# Patient Record
Sex: FEMALE | Race: WHITE | Employment: OTHER | URBAN - NONMETROPOLITAN AREA
[De-identification: names, ages, dates, MRNs, and addresses within clinical notes are randomized per-mention and may not be internally consistent; named-entity substitution may affect disease eponyms.]

---

## 2017-11-24 ENCOUNTER — HOSPITAL ENCOUNTER (OUTPATIENT)
Age: 67
Discharge: HOME | End: 2017-11-24
Payer: MEDICARE

## 2017-11-24 LAB
HCT VFR BLD CALC: 28.6 % (ref 36–46)
HEMOGLOBIN: 9.8 G/DL (ref 12–16)
MCH RBC QN AUTO: 33.8 PG (ref 26–34)
MCHC RBC AUTO-ENTMCNC: 34.2 G/DL (ref 31–37)
MCV RBC AUTO: 98.8 FL (ref 80–100)
PDW BLD-RTO: 12.4 % (ref 12.1–15.2)
PLATELET # BLD: 422 K/UL (ref 140–450)
PMV BLD AUTO: 7.2 FL (ref 6–12)
RBC # BLD: 2.9 M/UL (ref 4–5.2)
WBC # BLD: 8.5 K/UL (ref 3.5–11)

## 2017-11-24 PROCEDURE — 36415 COLL VENOUS BLD VENIPUNCTURE: CPT

## 2017-11-24 PROCEDURE — 85027 COMPLETE CBC AUTOMATED: CPT

## 2017-12-19 ENCOUNTER — HOSPITAL ENCOUNTER (OUTPATIENT)
Dept: PHYSICAL THERAPY | Age: 67
Setting detail: THERAPIES SERIES
Discharge: HOME | End: 2017-12-19
Payer: MEDICARE

## 2017-12-19 PROCEDURE — G8978 MOBILITY CURRENT STATUS: HCPCS

## 2017-12-19 PROCEDURE — G8979 MOBILITY GOAL STATUS: HCPCS

## 2017-12-19 PROCEDURE — 97110 THERAPEUTIC EXERCISES: CPT

## 2017-12-19 PROCEDURE — 97162 PT EVAL MOD COMPLEX 30 MIN: CPT

## 2017-12-19 NOTE — PROGRESS NOTES
Phone: [address] [facility]          Fax: [phone]                      Outpatient Physical Therapy                                                                      Evaluation  Date: 2017  Patient: Marianne Harrison  : 1950  [facility] #: [id number]  Referring Practitioner: Dr. Matthew Marrero     Referral Date : 17     Diagnosis: status post total hip replacement; right Z96.641    Treatment Diagnosis: difficulty with ambulation s/pt R YFN  Onset Date: 17  PT Insurance Information: [facility] Medicare   Total # of Visits Approved: 18   Total # of Visits to Date: 1  No Show: 0  Canceled Appointment: 0     Subjective  Subjective: Pt had a R YFN on  and has been recieving home health therapy since  and D/C dec 14. Pt denies having any pain without pain medication. Pt is using a walker and has not tried to walk without it. Additional Pertinent Hx: HTN. OA  Pain Screening  Patient Currently in Pain: Denies          Objective           Strength RLE  Comment: hip flex 4/5 no abd per precaution        Assessment  Body structures, Functions, Activity limitations: Decreased functional mobility , Decreased endurance, Decreased strength, Decreased high-level IADLs, Decreased balance  Assessment: Pt is a 78year old female that underwent a R YFN. Pt presents with decreased R hip strength, ambulates using a FWW with slight antalgic gait and has decreased ambulation tolerance. Pt will benefit from skilled PT in order to address these deficits. Prognosis: Good        Decision Making: Medium Complexity    Patient Education  Pt educated on her POC and HEP   Pt verbalized/demonstrated good understanding:     [x] Yes         [] No, pt required further clarification.       [x] Primary Impairment :   G Code:    [x] Mobility         [] Carry        [] Body Position       [] Self Care      [] Other:   Functional Impairment Current:  [] 0%    [] 1-19% [x] 20-39% [] 40-59% [] 60-79%    [] 80-99% [] 100%  Functional Impairment Goal:  [x] 0%    [] 1-19% [] 20-39% [] 40-59% [] 60-79%    [] 80-99% [] 100%  G Code Functional Impairment determined by:  [x] Clinical Judgment   [] Outcome Measure:     Goals  Short term goals  Time Frame for Short term goals: 3 weeks   Short term goal 1: Pt will be educated on her POC and HEP-met  Short term goal 2: Pt will initiate ambulation with SPC-met    Long term goals  Time Frame for Long term goals : 6 weeks  Long term goal 1: Pt will be safe and independent with her HEP  Long term goal 2: Pt will be able to ambulate community distances without a SPC   Long term goal 3: Pt will increase R hip stenght to 4+/5 in order to ascend and descend steps in a reciporical pattern  Long term goal 4: Pt will be able to perform 5 sit to stand transfers without UE support       Patient goals : \"to walk without a walker\"        Minutes Tracking:  Time In: 1015  Time Out: [address]  Minutes: [facility], PT, DPT       12/19/2017

## 2017-12-19 NOTE — PLAN OF CARE
[facility]           Phone: [phone]             Outpatient Physical Therapy  Fax: [phone]                                           Date: 2017  Patient: Ivan Miranda : 1950 [facility] #: [id number]   Referring Practitioner:  Dr. Sobia Perez  Referral Date:  17       [x] Plan of Care   [] Updated Plan of Care    Dates of Service to Include: 2017 to 17    Diagnosis:  status post total hip replacement; right Z96.641    Rehab (Treatment) Diagnosis:  difficulty with ambulation s/pt R YFN             Onset Date:  17    Attendance  Total # of Visits to Date: 1 No Show: 0 Canceled Appointment: 0    Assessment  Body structures, Functions, Activity limitations: Decreased functional mobility , Decreased endurance, Decreased strength, Decreased high-level IADLs, Decreased balance  Assessment: Pt is a 78year old female that underwent a R YFN. Pt presents with decreased R hip strength, ambulates using a FWW with slight antalgic gait and has decreased ambulation tolerance. Pt will benefit from skilled PT in order to address these deficits.      [x] Primary Impairment :  G Code:    [] Mobility         [] Carry        [] Body Position       [] Self Care      [] Other:   Functional Impairment Current:  [] 0%    [] 1-19% [x] 20-39% [] 40-59% [] 60-79%    [] 80-99% [] 100%  Functional Impairment Goal:  [x] 0%    [] 1-19% [] 20-39% [] 40-59% [] 60-79%    [] 80-99% [] 100%  G Code Functional Impairment determined by:  [x] Clinical Judgment   [] Outcome Measure:     Goals  Short term goals  Time Frame for Short term goals: 3 weeks   Short term goal 1: Pt will be educated on her POC and HEP-met  Short term goal 2: Pt will initiate ambulation with SPC-met  Long term goals  Time Frame for Long term goals : 6 weeks  Long term goal 1: Pt will be safe and independent with her HEP  Long term goal 2: Pt will be able to ambulate community distances without a SPC   Long term goal 3: Pt will increase R hip stenght to 4+/5 in order to ascend and descend steps in a reciporical pattern  Long term goal 4: Pt will be able to perform 5 sit to stand transfers without UE support     Prognosis  Prognosis: Good    Treatment Plan   Times per week: 3x/wk   Plan weeks: 6 weeks   [x]HP/CP      [x]Electrical Stim   [x]Therapeutic Exercise      [x]Gait Training  [x]Aquatics   [x]Ultrasound         [x]Patient Education/HEP   [x]Manual Therapy  []Traction    [x]Neuro-yakov        [x]Soft Tissue Mobs            []Home TENS  []Iontophoresis    []Orthotic casting/fitting      []Dry Needling             Electronically signed by: Madyson Franks PT, DPT    Date: 12/19/2017      ______________________________________ Date: 12/19/2017   Physician Signature

## 2017-12-20 ENCOUNTER — HOSPITAL ENCOUNTER (OUTPATIENT)
Dept: PHYSICAL THERAPY | Age: 67
Setting detail: THERAPIES SERIES
Discharge: HOME | End: 2017-12-20
Payer: MEDICARE

## 2017-12-20 PROCEDURE — 97110 THERAPEUTIC EXERCISES: CPT

## 2017-12-20 NOTE — PROGRESS NOTES
Time Frame for Long term goals : 6 weeks  Long term goal 1: Pt will be safe and independent with her HEP []Met  []Partially met  []Not met   Long term goal 2: Pt will be able to ambulate community distances without a [facility]  []Met  []Partially met  []Not met   Long term goal 3: Pt will increase R hip stenght to 4+/5 in order to ascend and descend steps in a reciporical pattern []Met  []Partially met  []Not met   Long term goal 4: Pt will be able to perform 5 sit to stand transfers without UE support  []Met  []Partially met  []Not met     []Met  []Partially met  []Not met       Minutes Tracking:  Time In: 1100  Time Out: [address] [facility]  Minutes: Bassam Kennedy 91 DPT, PT   Date: 12/20/2017

## 2017-12-22 ENCOUNTER — HOSPITAL ENCOUNTER (OUTPATIENT)
Dept: PHYSICAL THERAPY | Age: 67
Setting detail: THERAPIES SERIES
Discharge: HOME | End: 2017-12-22
Payer: MEDICARE

## 2017-12-22 PROCEDURE — 97110 THERAPEUTIC EXERCISES: CPT

## 2017-12-27 ENCOUNTER — HOSPITAL ENCOUNTER (OUTPATIENT)
Dept: PHYSICAL THERAPY | Age: 67
Setting detail: THERAPIES SERIES
Discharge: HOME | End: 2017-12-27
Payer: MEDICARE

## 2017-12-27 NOTE — PROGRESS NOTES
[facility]  Inpatient/Observation/Outpatient Rehabilitation    Date: 2017  Patient Name: Zhen George       [] Inpatient Acute/Observation       []  Outpatient  : 1950       [] Pt no showed for scheduled appointment    [] Pt refused/declined therapy at this time due to:           [x] Pt cancelled due to:  [x] No Reason Given   [] Sick/ill   [] Other:    Has other appointments scheduled       Amador Aguirre Date: 2017

## 2018-01-03 ENCOUNTER — HOSPITAL ENCOUNTER (OUTPATIENT)
Dept: PHYSICAL THERAPY | Age: 68
Setting detail: THERAPIES SERIES
Discharge: HOME | End: 2018-01-03
Payer: MEDICARE

## 2018-01-03 PROCEDURE — 97110 THERAPEUTIC EXERCISES: CPT

## 2018-01-05 ENCOUNTER — HOSPITAL ENCOUNTER (OUTPATIENT)
Dept: PHYSICAL THERAPY | Age: 68
Setting detail: THERAPIES SERIES
Discharge: HOME | End: 2018-01-05
Payer: MEDICARE

## 2018-01-05 PROCEDURE — 97110 THERAPEUTIC EXERCISES: CPT

## 2018-01-08 ENCOUNTER — HOSPITAL ENCOUNTER (OUTPATIENT)
Dept: PHYSICAL THERAPY | Age: 68
Setting detail: THERAPIES SERIES
Discharge: HOME | End: 2018-01-08
Payer: MEDICARE

## 2018-01-10 ENCOUNTER — HOSPITAL ENCOUNTER (OUTPATIENT)
Dept: PHYSICAL THERAPY | Age: 68
Setting detail: THERAPIES SERIES
Discharge: HOME | End: 2018-01-10
Payer: MEDICARE

## 2018-01-10 PROCEDURE — 97110 THERAPEUTIC EXERCISES: CPT

## 2018-01-10 PROCEDURE — 97116 GAIT TRAINING THERAPY: CPT

## 2018-01-10 NOTE — PROGRESS NOTES
Phone: [facility]           Fax: [phone]                           Outpatient Physical Therapy                                                                            Daily Note    Patient: Brian Tee : 1950  [facility] #: [id number]   Referring Practitioner:  Dr. Sheree Doan     Referral Date : 17     Date: 1/10/2018    Diagnosis: status post total hip replacement; right Z96.641  Treatment Diagnosis: difficulty with ambulation s/pt R YFN    Onset Date: 17  PT Insurance Information: [employer] MEDICARE  Total # of Visits Approved: 18 Per Physician Order  Total # of Visits to Date: 6  No Show: 0  Canceled Appointment: 2      Pre-Treatment Pain:  0/10  Subjective: Patient states her hip feels good today and denies any pain. Patient stated she has tried walking with a cane however feels \"uncoordinated\" and states she feels better when not walking with it. Patient stated she returns to the Doctor 2018. Exercises:  Exercise 1: HEP sink ex  Exercise 2: bike 8 minutes   Exercise 3: step up forward 6\" x15/ lat step up 6\" x10   Exercise 4: sink exercises on airex  x10 no ABD 2#  Exercise 5: gait train without AD at counter if needed x3 laps   Exercise 6: startrac flex 5pl 2x10  Exercise 7: sit to stand sitting on airex x10 without usage of UE   Exercise 8: SLR/ heel slide/ SAQ x10 2#  Exercise 9: SIdeways amb at counter x4 laps baack and forth   Exercise 10: Leg press 3 pl 10x2    Assessment  Assessment: Patient required x2 seated rest breaks throughout treatment due to increased fatigue/poor endurance. While ambulating without AD patient demonstrates knee hyperextension and slight foot drop requiring cueing to perform proper heel strike and push off with poor carryover.      Patient Education  Reviewed HEP with patient in agreement to work on sink exercises, supine exercises, walking up steps step over step leading with R LE   Pt

## 2018-01-12 ENCOUNTER — HOSPITAL ENCOUNTER (OUTPATIENT)
Dept: PHYSICAL THERAPY | Age: 68
Setting detail: THERAPIES SERIES
Discharge: HOME | End: 2018-01-12
Payer: MEDICARE

## 2018-01-12 PROCEDURE — 97110 THERAPEUTIC EXERCISES: CPT

## 2018-01-12 NOTE — PROGRESS NOTES
[]Partially met  []Not met      []Met  []Partially met  []Not met      []Met   []Partially met  []Not met     Long Term Goals - Time Frame for Long term goals : 6 weeks  Long term goal 1: Pt will be safe and independent with her HEP []Met  []Partially met  [x]Not met   Long term goal 2: Pt will be able to ambulate community distances without a [facility]  []Met  []Partially met  [x]Not met   Long term goal 3: Pt will increase R hip stenght to 4+/5 in order to ascend and descend steps in a reciporical pattern []Met  []Partially met  [x]Not met   Long term goal 4: Pt will be able to perform 5 sit to stand transfers without UE support  []Met  []Partially met  [x]Not met     []Met  []Partially met  []Not met       Minutes Tracking:  Time In: 1230  Time Out: [address]  Minutes: [place], [place]   Date: 1/12/2018

## 2018-01-15 ENCOUNTER — HOSPITAL ENCOUNTER (OUTPATIENT)
Dept: PHYSICAL THERAPY | Age: 68
Setting detail: THERAPIES SERIES
Discharge: HOME | End: 2018-01-15
Payer: MEDICARE

## 2018-01-15 PROCEDURE — 97110 THERAPEUTIC EXERCISES: CPT

## 2018-01-15 NOTE — PROGRESS NOTES
Short Term Goals - Time Frame for Short term goals: 3 weeks      Short term goal 1: Pt will be educated on her POC and HEP-met                                        [x]Met   []Partially met  []Not met   Short term goal 2: Pt will initiate ambulation with SPC-met  [x]Met   []Partially met  []Not met      []Met   []Partially met  []Not met      []Met   []Partially met  []Not met     Long Term Goals - Time Frame for Long term goals : 6 weeks  Long term goal 1: Pt will be safe and independent with her HEP []Met  []Partially met  [x]Not met   Long term goal 2: Pt will be able to ambulate community distances without a [facility]  []Met  []Partially met  [x]Not met   Long term goal 3: Pt will increase R hip stenght to 4+/5 in order to ascend and descend steps in a reciporical pattern []Met  []Partially met  [x]Not met   Long term goal 4: Pt will be able to perform 5 sit to stand transfers without UE support  []Met  []Partially met  [x]Not met     []Met  []Partially met  []Not met       Minutes Tracking:  Time In: 1313  Time Out: [address]  Minutes: Alexander Travis   Date: 1/15/2018

## 2018-01-17 ENCOUNTER — HOSPITAL ENCOUNTER (OUTPATIENT)
Dept: PHYSICAL THERAPY | Age: 68
Setting detail: THERAPIES SERIES
Discharge: HOME | End: 2018-01-17
Payer: MEDICARE

## 2018-01-17 PROCEDURE — 97116 GAIT TRAINING THERAPY: CPT

## 2018-01-17 PROCEDURE — 97110 THERAPEUTIC EXERCISES: CPT

## 2018-01-17 NOTE — PROGRESS NOTES
[]Partially met  []Not met     Long Term Goals - Time Frame for Long term goals : 6 weeks  Long term goal 1: Pt will be safe and independent with her HEP []Met  []Partially met  []Not met   Long term goal 2: Pt will be able to ambulate community distances without a [facility]  []Met  []Partially met  []Not met   Long term goal 3: Pt will increase R hip stenght to 4+/5 in order to ascend and descend steps in a reciporical pattern []Met  []Partially met  []Not met   Long term goal 4: Pt will be able to perform 5 sit to stand transfers without UE support  []Met  []Partially met  []Not met     []Met  []Partially met  []Not met       Minutes Tracking:  Time In: 1300  Time Out: [address]  Minutes: 50    Denver Yates PTA Date: 1/17/2018

## 2018-01-18 ENCOUNTER — APPOINTMENT (OUTPATIENT)
Dept: PHYSICAL THERAPY | Age: 68
End: 2018-01-18
Payer: MEDICARE

## 2018-01-19 ENCOUNTER — HOSPITAL ENCOUNTER (OUTPATIENT)
Dept: PHYSICAL THERAPY | Age: 68
Setting detail: THERAPIES SERIES
Discharge: HOME | End: 2018-01-19
Payer: MEDICARE

## 2018-01-19 PROCEDURE — G8978 MOBILITY CURRENT STATUS: HCPCS

## 2018-01-19 PROCEDURE — G8979 MOBILITY GOAL STATUS: HCPCS

## 2018-01-19 PROCEDURE — 97110 THERAPEUTIC EXERCISES: CPT

## 2018-01-19 NOTE — PROGRESS NOTES
Phone: [facility]           Fax: [phone]                           Outpatient Physical Therapy                                                                            Daily Note    Patient: Ruben Pollock : 1950  CSN #: [id number]   Referring Practitioner:  Dr. Cheng Lloyd     Referral Date : 17     Date: 2018    Diagnosis: status post total hip replacement; right Z96.641  Treatment Diagnosis: difficulty with ambulation s/pt R YFN    Onset Date: 17  PT Insurance Information: [employer] MEDICARE  Total # of Visits Approved: 18 Per Physician Order  Total # of Visits to Date: 10  No Show: 0  Canceled Appointment: 2      Pre-Treatment Pain:  0/10  Subjective: Pt denies pain. She states her hip overall is doig well but LB hurts here and there. Exercises:  Exercise 1: HEP sink ex  Exercise 2: bike 10 minutes 2.5  Exercise 3: step up forward 8\" x15/ lat step up 6\" x10   Exercise 4: sink exercises on airex  x15 no ABD 2#  Exercise 5: gait train without AD at counter if needed x3 laps   Exercise 6: startrac flex 5pl 2x10 2 pl ext   Exercise 7: sit to stand  x10x2  without usage of UE      Exercise 9: SIdeways amb at counter x4 laps baack and forth   Exercise 10: Leg press 4 pl 10x2      Assessment  Assessment: Pt saw by Michelle Virgen PT/ DPT first 10 mins for G-code assessment. Pt reports still using AD for community distances but not at home unless its late/ dark. Pt able to complete 5 sit<>stands from 18 inch seat with no UE assist.     Patient Education  Cont current HEP, Cont working on mobility without AD. Pt verbalized/demonstrated good understanding:     [x] Yes         [] No, pt required further clarification.     Post Treatment Pain:  0/10      Plan  Times per week: 3x/wk   Plan weeks: 6 weeks       Goals  (Total # of Visits to Date: 10)   Short Term Goals - Time Frame for Short term goals: 3 weeks      Short term goal 1: Pt will be educated

## 2018-01-22 ENCOUNTER — HOSPITAL ENCOUNTER (OUTPATIENT)
Dept: PHYSICAL THERAPY | Age: 68
Setting detail: THERAPIES SERIES
Discharge: HOME | End: 2018-01-22
Payer: MEDICARE

## 2018-01-22 PROCEDURE — 97110 THERAPEUTIC EXERCISES: CPT

## 2018-01-22 NOTE — PROGRESS NOTES
Phone: [facility]           Fax: [phone]                           Outpatient Physical Therapy                                                                            Daily Note    Patient: Cooper Howard : 1950  CSN #: [id number]   Referring Practitioner:  Dr. Menard December     Referral Date : 17     Date: 2018    Diagnosis: status post total hip replacement; right Z96.641  Treatment Diagnosis: difficulty with ambulation s/pt R YFN    Onset Date: 17  PT Insurance Information: [employer] MEDICARE  Total # of Visits Approved: 18 Per Physician Order  Total # of Visits to Date: 11  No Show: 0  Canceled Appointment: 2      Pre-Treatment Pain:  0/10  Subjective: Pt reports her hip is feeling okay. She denies current pain. Exercises:  Exercise 1: HEP sink ex  Exercise 2: bike 10 minutes 2.5  Exercise 3: step up forward 8\" x15/ lat step up 6\" x10   Exercise 4: sink exercises on airex  x15 no ABD 2#     Exercise 6: startrac flex 5pl 2x10 2 pl ext   Exercise 7: sit to stand  x10x2  without usage of UE 5#     Exercise 9: SIdeways amb at counter x4 laps baack and forth   Exercise 10: Leg press 4 pl 10x2  Exercise 11: 1 large loops for endurance. Assessment  Assessment: Pt endurance slowly progressing with ability to amb around clininc using no AD. Pt cont to display gait deviations during amb,     Patient Education  Cont current pain. Pt verbalized/demonstrated good understanding:     [x] Yes         [] No, pt required further clarification.     Post Treatment Pain:  0/10      Plan  Times per week: 3x/wk   Plan weeks: 6 weeks       Goals  (Total # of Visits to Date: 6)   Short Term Goals - Time Frame for Short term goals: 3 weeks      Short term goal 1: Pt will be educated on her POC and HEP-met                                        [x]Met   []Partially met  []Not met   Short term goal 2: Pt will initiate ambulation with SPC-met [x]Met  []Partially met  []Not met      []Met   []Partially met  []Not met      []Met   []Partially met  []Not met     Long Term Goals - Time Frame for Long term goals : 6 weeks  Long term goal 1: Pt will be safe and independent with her HEP []Met  []Partially met  [x]Not met   Long term goal 2: Pt will be able to ambulate community distances without a SPC -not met []Met  []Partially met  [x]Not met   Long term goal 3: Pt will increase R hip stenght to 4+/5 in order to ascend and descend steps in a reciporical pattern []Met  []Partially met  [x]Not met   Long term goal 4: Pt will be able to perform 5 sit to stand transfers without UE support -MET [x]Met  []Partially met  []Not met     []Met  []Partially met  []Not met       Minutes Tracking:  Time In: 1402  Time Out: [address] [place]   Minutes: [address] [place] [address]   Date: 1/22/2018

## 2018-01-23 ENCOUNTER — APPOINTMENT (OUTPATIENT)
Dept: PHYSICAL THERAPY | Age: 68
End: 2018-01-23
Payer: MEDICARE

## 2018-01-24 ENCOUNTER — HOSPITAL ENCOUNTER (OUTPATIENT)
Dept: PHYSICAL THERAPY | Age: 68
Setting detail: THERAPIES SERIES
Discharge: HOME | End: 2018-01-24
Payer: MEDICARE

## 2018-01-24 PROCEDURE — 97116 GAIT TRAINING THERAPY: CPT

## 2018-01-24 PROCEDURE — 97110 THERAPEUTIC EXERCISES: CPT

## 2018-01-24 NOTE — PROGRESS NOTES
Phone: [facility]           Fax: [phone]                           Outpatient Physical Therapy                                                                            Daily Note    Patient: Brian Tee : 1950  [facility] #: [id number]   Referring Practitioner:  Dr. Sheree Doan     Referral Date : 17     Date: 2018    Diagnosis: status post total hip replacement; right Z96.641  Treatment Diagnosis: difficulty with ambulation s/pt R YFN    Onset Date: 17  PT Insurance Information: [employer] MEDICARE  Total # of Visits Approved: 18 Per Physician Order  Total # of Visits to Date: 12  No Show: 0  Canceled Appointment: 2      Pre-Treatment Pain:  0/10  Subjective: Patient denies pain and states today is the first day she is not using cane or walker. Patient stated prior to surgery she was limping a lot and feels like now she only limps when she feels like she is getting tired. Exercises:     Exercise 2: bike 10 minutes 2.5  Exercise 3: step up forward 8\" x15/ lat step up 6\" x10   Exercise 4: sink exercises on airex  x15 no ABD 2#- HEP   Exercise 5: SLR supine 2x10 ea   Exercise 6: startrac flex 5pl 2x10 2 pl ext      Exercise 8: stepping over hurdles reciprocally with 1 HHA along steps. Exercise 9: SIdeways amb at counter x4 laps baack and forth 1# ankle weights   Exercise 10: Leg press 4 pl 10x2     Exercise 12: Fwd TM 1.0 mph 2 mins    Assessment  Assessment: Patient arrived to clinic without AD and demonstrated right/left knee hyperextension when walking. Focused on patient stepping over hurdles this session and once shifting weight to R LE in order to advance L LE patient would hyperextend knee and required cueing to maintain slight flexion in knee with weight bearing. Analyzed gait pattern with and without AD with patient continuing to demonstrate knee hyperextension and slight antalgic gait.  Progress note will be written and faxed to  next visit prior to follow up appt on Friday. Patient Education  Spoke to patient on importance of completing SLR and sit to stands at home   Pt verbalized/demonstrated good understanding:     [x] Yes         [] No, pt required further clarification.     Post Treatment Pain:  0/10    Plan  Times per week: 3x/wk   Plan weeks: 6 weeks       Goals  (Total # of Visits to Date: 15)   Short Term Goals - Time Frame for Short term goals: 3 weeks    Short term goal 1: Pt will be educated on her POC and HEP-met                                        [x]Met   []Partially met  []Not met   Short term goal 2: Pt will initiate ambulation with SPC-met  [x]Met   []Partially met  []Not met      []Met   []Partially met  []Not met      []Met   []Partially met  []Not met     Long Term Goals - Time Frame for Long term goals : 6 weeks  Long term goal 1: Pt will be safe and independent with her HEP []Met  [x]Partially met  []Not met   Long term goal 2: Pt will be able to ambulate community distances without a SPC -not met []Met  [x]Partially met  []Not met   Long term goal 3: Pt will increase R hip stenght to 4+/5 in order to ascend and descend steps in a reciporical pattern []Met  [x]Partially met  []Not met   Long term goal 4: Pt will be able to perform 5 sit to stand transfers without UE support -MET [x]Met  []Partially met  []Not met     []Met  []Partially met  []Not met       Minutes Tracking:  Time In: 1301  Time Out: 449 [id number]  Minutes: 45    Mitchel Garcia PT, DPT Date: 1/24/2018

## 2018-01-25 ENCOUNTER — HOSPITAL ENCOUNTER (OUTPATIENT)
Dept: PHYSICAL THERAPY | Age: 68
Setting detail: THERAPIES SERIES
Discharge: HOME | End: 2018-01-25
Payer: MEDICARE

## 2018-01-25 PROCEDURE — 97110 THERAPEUTIC EXERCISES: CPT

## 2018-01-25 NOTE — PROGRESS NOTES
[]Partially met  []Not met     Long Term Goals - Time Frame for Long term goals : 6 weeks  Long term goal 1: Pt will be safe and independent with her HEP []Met  []Partially met  []Not met   Long term goal 2: Pt will be able to ambulate community distances without a SPC -not met []Met  []Partially met  []Not met     []Met  []Partially met  []Not met   Long term goal 4: Pt will be able to perform 5 sit to stand transfers without UE support -MET []Met  []Partially met  []Not met     []Met  []Partially met  []Not met       Minutes Tracking:  Time In: 1403  Time Out: [address]  Minutes: [address] Mot[address] PTA Date: 1/25/2018

## 2018-01-29 ENCOUNTER — HOSPITAL ENCOUNTER (OUTPATIENT)
Dept: PHYSICAL THERAPY | Age: 68
Setting detail: THERAPIES SERIES
Discharge: HOME | End: 2018-01-29
Payer: MEDICARE

## 2018-01-29 PROCEDURE — 97110 THERAPEUTIC EXERCISES: CPT

## 2018-01-31 ENCOUNTER — HOSPITAL ENCOUNTER (OUTPATIENT)
Dept: PHYSICAL THERAPY | Age: 68
Setting detail: THERAPIES SERIES
Discharge: HOME | End: 2018-01-31
Payer: MEDICARE

## 2018-01-31 PROCEDURE — 97110 THERAPEUTIC EXERCISES: CPT

## 2018-02-02 ENCOUNTER — HOSPITAL ENCOUNTER (OUTPATIENT)
Dept: PHYSICAL THERAPY | Age: 68
Setting detail: THERAPIES SERIES
Discharge: HOME | End: 2018-02-02
Payer: MEDICARE

## 2018-02-02 PROCEDURE — G8979 MOBILITY GOAL STATUS: HCPCS

## 2018-02-02 PROCEDURE — G8980 MOBILITY D/C STATUS: HCPCS

## 2018-02-02 PROCEDURE — G8978 MOBILITY CURRENT STATUS: HCPCS

## 2018-02-02 PROCEDURE — 97110 THERAPEUTIC EXERCISES: CPT

## 2018-08-10 NOTE — DISCHARGE SUMMARY
Phone: [facility]          Fax: [phone]                            Outpatient Physical Therapy                                                                    Discharge Summary    Patient: Emilia Watts  : 1950  [facility] #: [id number]   Referring physician: No admitting provider for patient encounter. Referring Practitioner: Dr. Merle Savage       Diagnosis: status post total hip replacement; right Z96.641      Date Treatment Initiated: 17  Date of Last Treatment: 18      PT Visit Information  Onset Date: 17  PT Insurance Information: Ann-Marie Brantley  Total # of Visits Approved: 18  Total # of Visits to Date:   Plan of Care/Certification Expiration Date: 17  No Show: 0  Canceled Appointment: 2      Frequency/Duration   3 times per week   6 weeks      Treatment Received  [x]HP/CP      [x]Electrical Stim   [x]Therapeutic Exercise      [x]Gait Training  [x]Aquatics   [x]Ultrasound         [x]Patient Education/HEP   [x]Manual Therapy  []Traction    [x]Neuro-yakov        [x]Soft Tissue Mobs            []Home TENS  []Iontophoresis    []Orthotic casting/fitting      []Dry Needling    Assessment  Assessment: No further issues after 2 week hold.  Pt D/C this visit        [x] Primary Impairment :     G Code:    [x] Mobility         [] Carry        [] Body Position       [] Self Care      [] Other:   Functional Impairment Current:  [x] 0%    [] 1-19% [] 20-39% [] 40-59% [] 60-79%    [] 80-99% [] 100%  G Code Functional Impairment determined by:  [x] Clinical Judgment   [] Outcome Measure:       Goals  Short term goals  Time Frame for Short term goals: 3 weeks   Short term goal 1: Pt will be educated on her POC and HEP-met  Short term goal 2: Pt will initiate ambulation with SPC-met    Long term goals  Time Frame for Long term goals : 6 weeks  Long term goal 1: Pt will be safe and independent with her HEP -MET  Long term goal 2: Pt will be able to

## 2019-01-21 NOTE — PROGRESS NOTES
Phone: Eugenia           Fax: [phone]                           Outpatient Physical Therapy                                                                            Daily Note    Patient: Ad Regan : 1950  CSN #: [id number]   Referring Practitioner:  Dr. Carlos Marinelli     Referral Date : 17     Date: 2018    Diagnosis: status post total hip replacement; right Z96.641  Treatment Diagnosis: difficulty with ambulation s/pt R YFN    Onset Date: 17  PT Insurance Information: [employer] MEDICARE  Total # of Visits Approved: 18 Per Physician Order  Total # of Visits to Date: 14  No Show: 0  Canceled Appointment: 2      Pre-Treatment Pain:  0/10  Subjective: Pt reports Dr was very happy with progress and stated that she can finish this week of therapy and should be good. Pt denies current pain. Exercises:  Exercise 1: HEP sink ex  Exercise 2: bike 10 minutes 2.5  Exercise 3: step up forward 8\" x15/ lat step up 6\" x10   Exercise 4: sink exercises on airex  x15 no ABD 2#- HEP      Exercise 6: startrac flex 5pl 2x10 2 pl ext   Exercise 7: sit to stand  x10x2  without usage of UE 5# YTB      Exercise 9: SIdeways amb at counter x4 laps baack and forth 1# ankle weights   Exercise 10: Leg press 4 pl 10x2  Exercise 11: 2 large loops for endurance. Assessment  Assessment: Pt reports she hasnt used AD for home/ community distances unless its snowy. Pt endurance slowly progressing. Patient Education  Cont current HEP. Pt verbalized/demonstrated good understanding:     [x] Yes         [] No, pt required further clarification.     Post Treatment Pain:  0/10      Plan  Times per week: 3x/wk   Plan weeks: 6 weeks       Goals  (Total # of Visits to Date: 15)   Short Term Goals - Time Frame for Short term goals: 3 weeks      Short term goal 1: Pt will be educated on her POC and HEP-met                                        [x]Met   []Partially CARDIOLOGY

## 2019-05-15 ENCOUNTER — APPOINTMENT (OUTPATIENT)
Dept: GENERAL RADIOLOGY | Age: 69
End: 2019-05-15
Payer: MEDICARE

## 2019-05-15 ENCOUNTER — APPOINTMENT (OUTPATIENT)
Dept: CT IMAGING | Age: 69
DRG: 042 | End: 2019-05-15
Payer: MEDICARE

## 2019-05-15 ENCOUNTER — HOSPITAL ENCOUNTER (INPATIENT)
Age: 69
LOS: 3 days | Discharge: HOME HEALTH | DRG: 042 | End: 2019-05-18
Attending: EMERGENCY MEDICINE | Admitting: PSYCHIATRY & NEUROLOGY
Payer: MEDICARE

## 2019-05-15 ENCOUNTER — APPOINTMENT (OUTPATIENT)
Dept: MRI IMAGING | Age: 69
DRG: 042 | End: 2019-05-15
Payer: MEDICARE

## 2019-05-15 ENCOUNTER — HOSPITAL ENCOUNTER (EMERGENCY)
Age: 69
Discharge: ANOTHER HOSPITAL | End: 2019-05-15
Attending: EMERGENCY MEDICINE
Payer: MEDICARE

## 2019-05-15 ENCOUNTER — APPOINTMENT (OUTPATIENT)
Dept: CT IMAGING | Age: 69
End: 2019-05-15
Payer: MEDICARE

## 2019-05-15 VITALS
HEART RATE: 81 BPM | HEIGHT: 66 IN | BODY MASS INDEX: 32.14 KG/M2 | OXYGEN SATURATION: 94 % | SYSTOLIC BLOOD PRESSURE: 174 MMHG | RESPIRATION RATE: 19 BRPM | WEIGHT: 200 LBS | TEMPERATURE: 98.2 F | DIASTOLIC BLOOD PRESSURE: 100 MMHG

## 2019-05-15 DIAGNOSIS — I63.9 CEREBROVASCULAR ACCIDENT (CVA), UNSPECIFIED MECHANISM (HCC): Primary | ICD-10-CM

## 2019-05-15 PROBLEM — G45.9 TIA (TRANSIENT ISCHEMIC ATTACK): Status: ACTIVE | Noted: 2019-05-15

## 2019-05-15 LAB
ABSOLUTE EOS #: 0.09 K/UL (ref 0–0.44)
ABSOLUTE IMMATURE GRANULOCYTE: <0.03 K/UL (ref 0–0.3)
ABSOLUTE LYMPH #: 1.95 K/UL (ref 1.1–3.7)
ABSOLUTE MONO #: 0.62 K/UL (ref 0.1–1.2)
ALBUMIN SERPL-MCNC: 4.1 G/DL (ref 3.5–5.2)
ALBUMIN/GLOBULIN RATIO: 1.2 (ref 1–2.5)
ALP BLD-CCNC: 60 U/L (ref 35–104)
ALT SERPL-CCNC: 15 U/L (ref 5–33)
ANION GAP SERPL CALCULATED.3IONS-SCNC: 14 MMOL/L (ref 9–17)
AST SERPL-CCNC: 13 U/L
BASOPHILS # BLD: 1 % (ref 0–2)
BASOPHILS ABSOLUTE: 0.05 K/UL (ref 0–0.2)
BILIRUB SERPL-MCNC: 0.56 MG/DL (ref 0.3–1.2)
BILIRUBIN URINE: NEGATIVE
BUN BLDV-MCNC: 12 MG/DL (ref 8–23)
BUN/CREAT BLD: 14 (ref 9–20)
CALCIUM SERPL-MCNC: 9.5 MG/DL (ref 8.6–10.4)
CHLORIDE BLD-SCNC: 101 MMOL/L (ref 98–107)
CHOLESTEROL/HDL RATIO: 3.1
CHOLESTEROL: 160 MG/DL
CO2: 26 MMOL/L (ref 20–31)
COLOR: YELLOW
COMMENT UA: ABNORMAL
CREAT SERPL-MCNC: 0.85 MG/DL (ref 0.5–0.9)
DIFFERENTIAL TYPE: NORMAL
EKG ATRIAL RATE: 81 BPM
EKG P AXIS: 39 DEGREES
EKG P-R INTERVAL: 148 MS
EKG Q-T INTERVAL: 390 MS
EKG QRS DURATION: 72 MS
EKG QTC CALCULATION (BAZETT): 453 MS
EKG R AXIS: 10 DEGREES
EKG T AXIS: 37 DEGREES
EKG VENTRICULAR RATE: 81 BPM
EOSINOPHILS RELATIVE PERCENT: 1 % (ref 1–4)
GFR AFRICAN AMERICAN: >60 ML/MIN
GFR NON-AFRICAN AMERICAN: >60 ML/MIN
GFR SERPL CREATININE-BSD FRML MDRD: ABNORMAL ML/MIN/{1.73_M2}
GFR SERPL CREATININE-BSD FRML MDRD: ABNORMAL ML/MIN/{1.73_M2}
GLUCOSE BLD-MCNC: 121 MG/DL (ref 70–99)
GLUCOSE URINE: NEGATIVE
HCT VFR BLD CALC: 43.1 % (ref 36.3–47.1)
HDLC SERPL-MCNC: 52 MG/DL
HEMOGLOBIN: 13.9 G/DL (ref 11.9–15.1)
IMMATURE GRANULOCYTES: 0 %
INR BLD: 1 (ref 0.9–1.2)
KETONES, URINE: NEGATIVE
LDL CHOLESTEROL: 95 MG/DL (ref 0–130)
LEUKOCYTE ESTERASE, URINE: NEGATIVE
LYMPHOCYTES # BLD: 27 % (ref 24–43)
MCH RBC QN AUTO: 32.2 PG (ref 25.2–33.5)
MCHC RBC AUTO-ENTMCNC: 32.3 G/DL (ref 28.4–34.8)
MCV RBC AUTO: 99.8 FL (ref 82.6–102.9)
MONOCYTES # BLD: 9 % (ref 3–12)
NITRITE, URINE: NEGATIVE
NRBC AUTOMATED: 0 PER 100 WBC
PARTIAL THROMBOPLASTIN TIME: 24.5 SEC (ref 23.2–34.4)
PDW BLD-RTO: 12.4 % (ref 11.8–14.4)
PH UA: 6 (ref 5–8)
PLATELET # BLD: 282 K/UL (ref 138–453)
PLATELET ESTIMATE: NORMAL
PMV BLD AUTO: 9.9 FL (ref 8.1–13.5)
POTASSIUM SERPL-SCNC: 4.1 MMOL/L (ref 3.7–5.3)
PROTEIN UA: NEGATIVE
PROTHROMBIN TIME: 10.4 SEC (ref 9.7–12.2)
RBC # BLD: 4.32 M/UL (ref 3.95–5.11)
RBC # BLD: NORMAL 10*6/UL
SEG NEUTROPHILS: 62 % (ref 36–65)
SEGMENTED NEUTROPHILS ABSOLUTE COUNT: 4.38 K/UL (ref 1.5–8.1)
SODIUM BLD-SCNC: 141 MMOL/L (ref 135–144)
SPECIFIC GRAVITY UA: 1.08 (ref 1–1.03)
TOTAL PROTEIN: 7.5 G/DL (ref 6.4–8.3)
TRIGL SERPL-MCNC: 64 MG/DL
TROPONIN INTERP: NORMAL
TROPONIN T: <0.03 NG/ML
TROPONIN, HIGH SENSITIVITY: NORMAL NG/L (ref 0–14)
TURBIDITY: CLEAR
URINE HGB: NEGATIVE
UROBILINOGEN, URINE: NORMAL
VLDLC SERPL CALC-MCNC: NORMAL MG/DL (ref 1–30)
WBC # BLD: 7.1 K/UL (ref 3.5–11.3)
WBC # BLD: NORMAL 10*3/UL

## 2019-05-15 PROCEDURE — 99223 1ST HOSP IP/OBS HIGH 75: CPT | Performed by: PSYCHIATRY & NEUROLOGY

## 2019-05-15 PROCEDURE — 80053 COMPREHEN METABOLIC PANEL: CPT

## 2019-05-15 PROCEDURE — 85610 PROTHROMBIN TIME: CPT

## 2019-05-15 PROCEDURE — 99285 EMERGENCY DEPT VISIT HI MDM: CPT

## 2019-05-15 PROCEDURE — 93005 ELECTROCARDIOGRAM TRACING: CPT

## 2019-05-15 PROCEDURE — 6360000002 HC RX W HCPCS: Performed by: STUDENT IN AN ORGANIZED HEALTH CARE EDUCATION/TRAINING PROGRAM

## 2019-05-15 PROCEDURE — 6370000000 HC RX 637 (ALT 250 FOR IP): Performed by: STUDENT IN AN ORGANIZED HEALTH CARE EDUCATION/TRAINING PROGRAM

## 2019-05-15 PROCEDURE — 36415 COLL VENOUS BLD VENIPUNCTURE: CPT

## 2019-05-15 PROCEDURE — 70498 CT ANGIOGRAPHY NECK: CPT

## 2019-05-15 PROCEDURE — 70496 CT ANGIOGRAPHY HEAD: CPT

## 2019-05-15 PROCEDURE — 6360000004 HC RX CONTRAST MEDICATION: Performed by: EMERGENCY MEDICINE

## 2019-05-15 PROCEDURE — 71045 X-RAY EXAM CHEST 1 VIEW: CPT

## 2019-05-15 PROCEDURE — 2060000000 HC ICU INTERMEDIATE R&B

## 2019-05-15 PROCEDURE — 84484 ASSAY OF TROPONIN QUANT: CPT

## 2019-05-15 PROCEDURE — 2580000003 HC RX 258: Performed by: STUDENT IN AN ORGANIZED HEALTH CARE EDUCATION/TRAINING PROGRAM

## 2019-05-15 PROCEDURE — 80061 LIPID PANEL: CPT

## 2019-05-15 PROCEDURE — 70551 MRI BRAIN STEM W/O DYE: CPT

## 2019-05-15 PROCEDURE — APPNB60 APP NON BILLABLE TIME 46-60 MINS: Performed by: NURSE PRACTITIONER

## 2019-05-15 PROCEDURE — 85025 COMPLETE CBC W/AUTO DIFF WBC: CPT

## 2019-05-15 PROCEDURE — 85730 THROMBOPLASTIN TIME PARTIAL: CPT

## 2019-05-15 PROCEDURE — 2580000003 HC RX 258: Performed by: NURSE PRACTITIONER

## 2019-05-15 PROCEDURE — 81003 URINALYSIS AUTO W/O SCOPE: CPT

## 2019-05-15 PROCEDURE — 83036 HEMOGLOBIN GLYCOSYLATED A1C: CPT

## 2019-05-15 PROCEDURE — 70450 CT HEAD/BRAIN W/O DYE: CPT

## 2019-05-15 PROCEDURE — 6370000000 HC RX 637 (ALT 250 FOR IP): Performed by: EMERGENCY MEDICINE

## 2019-05-15 RX ORDER — OMEGA-3-ACID ETHYL ESTERS 1 G/1
1 CAPSULE, LIQUID FILLED ORAL DAILY
Status: DISCONTINUED | OUTPATIENT
Start: 2019-05-15 | End: 2019-05-19 | Stop reason: HOSPADM

## 2019-05-15 RX ORDER — LISINOPRIL 20 MG/1
20 TABLET ORAL DAILY
Status: DISCONTINUED | OUTPATIENT
Start: 2019-05-15 | End: 2019-05-19 | Stop reason: HOSPADM

## 2019-05-15 RX ORDER — ONDANSETRON 2 MG/ML
4 INJECTION INTRAMUSCULAR; INTRAVENOUS EVERY 6 HOURS PRN
Status: DISCONTINUED | OUTPATIENT
Start: 2019-05-15 | End: 2019-05-19 | Stop reason: HOSPADM

## 2019-05-15 RX ORDER — 0.9 % SODIUM CHLORIDE 0.9 %
500 INTRAVENOUS SOLUTION INTRAVENOUS ONCE
Status: COMPLETED | OUTPATIENT
Start: 2019-05-15 | End: 2019-05-15

## 2019-05-15 RX ORDER — SODIUM CHLORIDE 0.9 % (FLUSH) 0.9 %
10 SYRINGE (ML) INJECTION EVERY 12 HOURS SCHEDULED
Status: DISCONTINUED | OUTPATIENT
Start: 2019-05-15 | End: 2019-05-19 | Stop reason: HOSPADM

## 2019-05-15 RX ORDER — HYDRALAZINE HYDROCHLORIDE 20 MG/ML
10 INJECTION INTRAMUSCULAR; INTRAVENOUS EVERY 6 HOURS PRN
Status: DISCONTINUED | OUTPATIENT
Start: 2019-05-15 | End: 2019-05-19 | Stop reason: HOSPADM

## 2019-05-15 RX ORDER — LABETALOL HYDROCHLORIDE 5 MG/ML
10 INJECTION, SOLUTION INTRAVENOUS EVERY 4 HOURS PRN
Status: DISCONTINUED | OUTPATIENT
Start: 2019-05-15 | End: 2019-05-19 | Stop reason: HOSPADM

## 2019-05-15 RX ORDER — CHLORAL HYDRATE 500 MG
1040 CAPSULE ORAL DAILY
Status: DISCONTINUED | OUTPATIENT
Start: 2019-05-15 | End: 2019-05-15

## 2019-05-15 RX ORDER — ATORVASTATIN CALCIUM 80 MG/1
80 TABLET, FILM COATED ORAL NIGHTLY
Status: DISCONTINUED | OUTPATIENT
Start: 2019-05-15 | End: 2019-05-19 | Stop reason: HOSPADM

## 2019-05-15 RX ORDER — ALENDRONATE SODIUM 70 MG/1
70 TABLET ORAL
COMMUNITY

## 2019-05-15 RX ORDER — LISINOPRIL 20 MG/1
40 TABLET ORAL DAILY
COMMUNITY

## 2019-05-15 RX ORDER — ASPIRIN 325 MG
325 TABLET ORAL ONCE
Status: COMPLETED | OUTPATIENT
Start: 2019-05-15 | End: 2019-05-15

## 2019-05-15 RX ORDER — CHLORAL HYDRATE 500 MG
500 CAPSULE ORAL DAILY
COMMUNITY

## 2019-05-15 RX ORDER — ASPIRIN 325 MG
325 TABLET ORAL DAILY
Status: DISCONTINUED | OUTPATIENT
Start: 2019-05-16 | End: 2019-05-19 | Stop reason: HOSPADM

## 2019-05-15 RX ORDER — ALENDRONATE SODIUM 70 MG/1
70 TABLET ORAL
Status: DISCONTINUED | OUTPATIENT
Start: 2019-05-15 | End: 2019-05-15

## 2019-05-15 RX ORDER — ACETAMINOPHEN 325 MG/1
650 TABLET ORAL EVERY 4 HOURS PRN
Status: DISCONTINUED | OUTPATIENT
Start: 2019-05-15 | End: 2019-05-19 | Stop reason: HOSPADM

## 2019-05-15 RX ORDER — SODIUM CHLORIDE 0.9 % (FLUSH) 0.9 %
10 SYRINGE (ML) INJECTION PRN
Status: DISCONTINUED | OUTPATIENT
Start: 2019-05-15 | End: 2019-05-19 | Stop reason: HOSPADM

## 2019-05-15 RX ADMIN — LISINOPRIL 20 MG: 20 TABLET ORAL at 22:29

## 2019-05-15 RX ADMIN — ENOXAPARIN SODIUM 40 MG: 40 INJECTION, SOLUTION INTRAVENOUS; SUBCUTANEOUS at 22:30

## 2019-05-15 RX ADMIN — VITAMIN D, TAB 1000IU (100/BT) 1000 UNITS: 25 TAB at 22:30

## 2019-05-15 RX ADMIN — METOPROLOL TARTRATE 25 MG: 25 TABLET ORAL at 22:30

## 2019-05-15 RX ADMIN — SODIUM CHLORIDE 500 ML: 9 INJECTION, SOLUTION INTRAVENOUS at 14:54

## 2019-05-15 RX ADMIN — ATORVASTATIN CALCIUM 80 MG: 80 TABLET, FILM COATED ORAL at 22:31

## 2019-05-15 RX ADMIN — IOHEXOL 75 ML: 350 INJECTION, SOLUTION INTRAVENOUS at 14:29

## 2019-05-15 RX ADMIN — ASPIRIN 325 MG: 325 TABLET, COATED ORAL at 10:48

## 2019-05-15 RX ADMIN — Medication 10 ML: at 22:34

## 2019-05-15 RX ADMIN — OMEGA-3-ACID ETHYL ESTERS 1 G: 1 CAPSULE, LIQUID FILLED ORAL at 22:30

## 2019-05-15 ASSESSMENT — ENCOUNTER SYMPTOMS
APNEA: 0
SHORTNESS OF BREATH: 0
EYE DISCHARGE: 0
NAUSEA: 0
COUGH: 0
SORE THROAT: 0
DIARRHEA: 0
CHEST TIGHTNESS: 0
EYE REDNESS: 0
RHINORRHEA: 0
EYE PAIN: 0
ABDOMINAL PAIN: 0
SHORTNESS OF BREATH: 0
VOMITING: 0
ABDOMINAL PAIN: 0
CONSTIPATION: 0

## 2019-05-15 ASSESSMENT — PAIN SCALES - GENERAL
PAINLEVEL_OUTOF10: 0
PAINLEVEL_OUTOF10: 0

## 2019-05-15 NOTE — ED NOTES
Bed: 29  Expected date:   Expected time:   Means of arrival:   Comments:  [address] RN  05/15/19 2418

## 2019-05-15 NOTE — PROGRESS NOTES
PHARMACY NOTE    Jerome Driscoll was ordered the oral bisphosphonate, Fosamax. Oral Bisphosphonates have an increased risk of serious GI events if the strict dosing instructions are not followed. Per the FDA labeling, oral bisphosphonates must be taken at least 30 min (60 min for ibandronate) before the first food, beverage or medication of the day. Patients MUST also avoid lying down for at least 30 min (60 minutes for ibandronate) after taking the oral bisphosphonate. Because these strict dosage instructions are difficult to follow in many hospitalized patients, orders for oral bisphosphonate therapy will be discontinued per the [address] [place] [address]. Consider risk versus benefits when resuming the oral bisphosphonate at discharge.    Genna Wilson [facility].5/15/2019  6:39 PM

## 2019-05-15 NOTE — CONSULTS
Endovascular Neurosurgery Note  Stroke Consult paged @[phone]  ER Room # 29  Arrival to patient bedside @ [phone]  5/15/2019 1:46 PM    Pt Name: Taylor Newton  MRN: [id number]  YOB: 1950  Date of evaluation: 5/15/2019  Primary Care Physician: Keron Mclaughlin, DO Taylor Newton is a 80 y.o. female with a history of HTN who presents as a transfer from [facility] with recurrent expressive aphasia, concern for stroke. LKW 5/14/19 around 2230 prior to bed. Although patient does report she had an episode of difficulty speaking yesterday around 1300 that resolved by 1600, she felt back to normal before bed. Patient woke up this morning a little before 0800AM and states she \"had a weird feeling\" and called EMS. States she didn't talk to anyone at that time so she's not sure if she was having difficulty speaking or not. She was initially evaluated at [facility] ED.  NIH 0.  CT Head showed two areas of low attenuation in right frontal lobe and right parietal lobe suggesting more remote infarct. Given ASA 325mg x1. Transferred to [facility] for further evaluation and management. On exam in ED, NIH 1 for mild expressive aphasia. No other associated symptoms; denies headache, vision changes, dizziness, numbness/tingling, weakness, SOB, chest pain. She tries to take a baby ASA everyday. Allergies  is allergic to no known allergies. Medications  Prior to Admission medications    Medication Sig Start Date End Date Taking?  Authorizing Provider   metoprolol tartrate (LOPRESSOR) 25 MG tablet Take 25 mg by mouth daily    Historical Provider, MD   alendronate (FOSAMAX) 70 MG tablet Take 70 mg by mouth every 7 days    Historical Provider, MD   lisinopril (PRINIVIL;ZESTRIL) 20 MG tablet Take 20 mg by mouth daily    Historical Provider, MD   aspirin 81 MG tablet Take 81 mg by mouth daily    Historical Provider, MD   vitamin D (CHOLECALCIFEROL) 1000 UNIT TABS tablet Take 1,000 Units by mouth daily Extremity:  normal  Right Lower Extremity:  normal  Left Lower Extremity:  normal    Coordination/Dysmetria:  Heel to Shin:  Right:  normal  Left:  normal  Finger to Nose:   Right:  normal  Left:  normal      SKIN:  no rash        INITIAL NIH STROKE SCALE    Time Performed:  1330    Administer stroke scale items in the order listed. Record performance in each category after each subscale exam. Do not go back and change scores. Follow directions provided for each exam technique. Scores should reflect what the patient does, not what the clinician thinks the patient can do. The clinician should record answers while administering the exam and work quickly. Except where indicated, the patient should not be coached (i.e., repeated requests to patient to make a special effort). NIH Stroke Scale Total (if not done complete detailed one below):    1a.  Level of consciousness:  0 - alert; keenly responsive  1b. Level of consciousness questions:  0 - answers both questions correctly  1c. Level of consciousness questions:  0 - performs both tasks correctly  2. Best Gaze:  0 - normal  3. Visual:  0 - no visual loss  4. Facial Palsy:  0 - normal symmetric movement  5a. Motor left arm:  0 - no drift, limb holds 90 (or 45) degrees for full 10 seconds  5b. Motor right arm:  0 - no drift, limb holds 90 (or 45) degrees for full 10 seconds  6a. Motor left le - no drift; leg holds 30 degree position for full 5 seconds  6b. Motor right le - no drift; leg holds 30 degree position for full 5 seconds  7. Limb Ataxia:  0 - absent  8. Sensory:  0 - normal; no sensory loss  9. Best Language:  1 - mild to moderate aphasia; some obvious loss of fluency or facility of comprehension without significant limitation on ideas expressed or form of expression. Reduction of speech and/or comprehension, however, makes conversation about provided materials difficult or impossible.   For example, in conversation about provided materials, examiner can identify picture or naming card content from patient's response. 10.  Dysarthria:  0 - normal  11. Extinction and Inattention:  0 - no abnormality  TOTAL: 1    Imaging:  CT brain without contrast:  Two areas of low attenuation in right frontal lobe and right parietal lobe suggesting more remote infarct. *Radiologist called and feels there is an acute stroke developing left parietal/occpital area. CTA head/neck with and without contrast:  No LVO/significant stenosis  MRI brain without contrast (limited stroke protocol):  Pending    Assessment  1. Last Known Well (date and time): 5/14/19 2230  2. Candidate for IV tPA therapy     Yes []     No  [x] due to the following exclusion criteria: Out of window  3. Candidate for Thrombectomy    Yes []      No [x] due to the following exclusion criteria: Low NIH, CTAs pending    Recommendations:   [] General Care Status - prefer 5th floor (5A/5C)  [x] ICU Status - prefer Neuro ICU (5B)  Please use the following admission order set for stroke admission:   [] [id number] - ELIEZER Intercerebral Hemorrhage Admission   [] [id number] - ELIEZER Sub Arachnoid Hemorrhage Admission   [] [id number] - ELIEZER Ischemic Stroke TPA Treatment Focused   [] [id number] - IP Ischemic Stroke ICU Post Alteplase (TPA) Admission    [x] [id number] - GEN Ischemic Stroke Non-Thrombolytic Focussed    The patient is a 79 yo female with a history of HTN who presents as a transfer from [facility] due to concern for stroke. Presented with complaints of recurrent expressive aphasia. CT Head showed two areas of low attenuation in right frontal lobe and right parietal lobe suggesting more remote infarct. NIH 1. Acute stroke vs TIA vs less likely seizure/infection.     MRI head without contrast - limited stroke evaluation  0.9% NS - 500 ml bolus post IV contrast   0.9% NS infusion at 75 ml/hour  Recommend SBP <220  Given ASA 325mg x1 at outlying ED, Continue ASA 325mg QD 5/16  Zocor 80mg nightly  2D cardiac echo  Physical Therapy  Occupational Therapy  Speech Therapy with swallow evaluation  Fasting Lipid panel  Hgb A1c  Ok for primary team to start anticoagulation as appropriate for DVT prophylaxis  NIHSS assessment every shift / change in caregiver.      Discussed with MOE Barraza - CNP  Neuro Critical Care  Pager [address] [place] [address]

## 2019-05-15 NOTE — ED PROVIDER NOTES
[facility] ED  Emergency Department Encounter  EmergencyMedicine Resident     Pt Name:Renetta Burger  MRN: [id number]  [place] 1950  Date of evaluation: 5/15/19  PCP:  Frieda Baig, [address] [place] [address]       Chief Complaint   Patient presents with    Neurologic Problem       HISTORY OF PRESENT ILLNESS  (Location/Symptom, Timing/Onset, Context/Setting, Quality, Duration, Modifying Factors, Severity.)      Divina Montes is a 80 y.o. female who presents as a transfer from [facility].  Patient complaint was difficulty speaking. States that she awoke this morning around 8 AM.  States that she had difficulty getting the words out that she wanted to say. Patient went to [facility] and had CT head and blood work done. Concern for possible stroke. Patient denies any history of stroke and states that he has history of high blood pressure. Denies any injury or trauma. Denies any weakness, numbness, tingling, chest pain, or shortness of breath. Patient sent here for further evaluation. Last known well 10 PM before patient went to sleep. PAST MEDICAL / SURGICAL / SOCIAL / FAMILY HISTORY      has a past medical history of Hypertension. has a past surgical history that includes knee surgery (Bilateral); hip surgery (Right); Carpal tunnel release (Bilateral); and Cataract removal (Bilateral). Social History     Socioeconomic History    Marital status:       Spouse name: Not on file    Number of children: Not on file    Years of education: Not on file    Highest education level: Not on file   Occupational History    Not on file   Social Needs    Financial resource strain: Not on file    Food insecurity:     Worry: Not on file     Inability: Not on file    Transportation needs:     Medical: Not on file     Non-medical: Not on file   Tobacco Use    Smoking status: Never Smoker    Smokeless tobacco: Never Used   Substance and Sexual Activity    Alcohol use: Not on file    Drug use: Not on file    Sexual activity: Not on file   Lifestyle    Physical activity:     Days per week: Not on file     Minutes per session: Not on file    Stress: Not on file   Relationships    Social connections:     Talks on phone: Not on file     Gets together: Not on file     Attends Denominational service: Not on file     Active member of club or organization: Not on file     Attends meetings of clubs or organizations: Not on file     Relationship status: Not on file    Intimate partner violence:     Fear of current or ex partner: Not on file     Emotionally abused: Not on file     Physically abused: Not on file     Forced sexual activity: Not on file   Other Topics Concern    Not on file   Social History Narrative    Not on file       History reviewed. No pertinent family history. Allergies:  No known allergies    Home Medications:  Prior to Admission medications    Medication Sig Start Date End Date Taking? Authorizing Provider   metoprolol tartrate (LOPRESSOR) 25 MG tablet Take 25 mg by mouth daily    Historical Provider, MD   alendronate (FOSAMAX) 70 MG tablet Take 70 mg by mouth every 7 days    Historical Provider, MD   lisinopril (PRINIVIL;ZESTRIL) 20 MG tablet Take 20 mg by mouth daily    Historical Provider, MD   aspirin 81 MG tablet Take 81 mg by mouth daily    Historical Provider, MD   vitamin D (CHOLECALCIFEROL) 1000 UNIT TABS tablet Take 1,000 Units by mouth daily    Historical Provider, MD   Omega-3 Fatty Acids (OMEGA-3 FISH OIL) 1000 MG CAPS Take 1,040 mg by mouth daily    Historical Provider, MD       REVIEW OF SYSTEMS    (2-9 systems for level 4, 10 or more for level 5)      Review of Systems   Constitutional: Negative for chills and fever. HENT: Negative for congestion and rhinorrhea. Eyes: Negative for pain and visual disturbance. Respiratory: Negative for cough and shortness of breath. Cardiovascular: Negative for chest pain and palpitations. PLAN (LABS / IMAGING / EKG):  Orders Placed This Encounter   Procedures    CTA HEAD W CONTRAST    CTA NECK W CONTRAST    MRI BRAIN WO CONTRAST    Urinalysis Reflex to Culture    Inpatient consult to Neurology    Inpatient consult to Neurology    PATIENT STATUS (FROM ED OR OR/PROCEDURAL) Inpatient       MEDICATIONS ORDERED:  Orders Placed This Encounter   Medications    0.9 % sodium chloride bolus    iohexol (OMNIPAQUE 350) solution 75 mL       DDX: CVA, TIA, expressive aphasia    DIAGNOSTIC RESULTS / EMERGENCY DEPARTMENT COURSE / MDM     LABS:  No results found for this visit on 05/15/19. IMPRESSION: Patient evaluated by myself and attending physician. Patient appears no acute distress. Vital signs normal on arrival.  He is alert and oriented ×3 but when I asked her where she was here difficulty getting out [facility] but states that in her head she knew what she wanted to say. Does appear to still have some expressive aphasia. No focal neurologic deficits on exam and motor strength out of 5 bilateral upper and lower extremities. Normal finger-nose bilaterally. We'll continue a stroke consult and admission. Stroke team had already evaluated the patient and recommended further imaging. Patient was not candidate for TPA as symptoms have been ongoing for over 4.5 hours and patient with NIH of 0 at [facility] according to note. INITIAL NIH STROKE SCALE    Time Performed:  1:55 PM    Administer stroke scale items in the order listed. Record performance in each category after each subscale exam. Do not go back and change scores. Follow directions provided for each exam technique. Scores should reflect what the patient does, not what the clinician thinks the patient can do. The clinician should record answers while administering the exam and work quickly. Except where indicated, the patient should not be coached (i.e., repeated requests to patient to make a special effort). NEUROLOGY    CRITICAL CARE:  None    FINAL IMPRESSION      1.  Cerebrovascular accident (CVA), unspecified mechanism ([address]          DISPOSITION / [address] Admitted 05/15/2019 02:41:41 PM      PATIENT REFERRED TO:  DO Jaime Vargas 63            DISCHARGE MEDICATIONS:  New Prescriptions    No medications on file       Joshua Fuentes DO  Emergency Medicine Resident    (Please note that portions of thisnote were completed with a voice recognition program.  Efforts were made to edit the dictations but occasionally words are mis-transcribed.)        Joshua Fuentes DO  05/15/19 9706

## 2019-05-15 NOTE — ED NOTES
Pt readied for transport to MRI. Report given to [facility] [address]  Pt taken to MRI.   Family at bedside     [facility]  05/15/19 8536

## 2019-05-15 NOTE — CARE COORDINATION
Case Management Initial Discharge [address],             Met with:patient, son, and daughter in law to discuss discharge plans. Information verified: address, contacts, phone number, , insurance No  PCP: Jesse Hood DO  Date of last visit: 2018    Insurance Provider: [employer] [facility] Medicare    Discharge Planning    Living Arrangements:  Children   Support Systems:  Family Members, [id number] [address] has 1 stories  6 stairs to climb to get into front door, n/a stairs to climb to reach second floor  Location of bedroom/bathroom in home main    Patient able to perform ADL's:Independent    Current Services (outpatient & in home) none  DME equipment: walker and cane  DME provider: [address] [facility] [address]: [employer] [place]   Potential Assistance Purchasing Medications:  No  Does patient want to participate in local refill/ meds to beds program?  No    Potential Assistance Needed:  N/A    Patient agreeable to home care: Yes  Freedom of choice provided:  yes    Prior SNF/Rehab Placement and Facility: none  Agreeable to SNF/Rehab: yes  [place] of choice provided: yes   Evaluation: yes    Expected Discharge date:     Patient expects to be discharged to: Follow Up Appointment: Best Day/ Time:      Transportation provider: Abby Riddle vs family  Transportation arrangements needed for discharge: Yes or No    Readmission Risk              Risk of Unplanned Readmission:        10             Does patient have a readmission risk score greater than 14?: No  If yes, follow-up appointment must be made within 7 days of discharge. Discharge Plan:  [facility], [facility]. Patient will need speech therapy.  Provided list.         Electronically signed by Liza Ferguson RN on 5/15/19 at 6:44 PM

## 2019-05-15 NOTE — DISCHARGE INSTR - COC
Continuity of Care Form    Patient Name: Vivian Garcia   :  1950  MRN:  [id number]    [facility] date:  5/15/2019  Discharge date:  19    Code Status Order: No Order   Advance Directives:     Admitting Physician:  No admitting provider for patient encounter. PCP: Makayla Estrada DO    Discharging Nurse: [facility] Unit/Room#:   Discharging Unit Phone Number: [phone]    Emergency Contact:   Extended Emergency Contact Information  Primary Emergency Contact: [address] of [address] [place] [address] Phone: [phone]  Relation: Child  Secondary Emergency Contact: Tiesha Soto, [address] Phone: [phone]  Relation: Other    Past Surgical History:  Past Surgical History:   Procedure Laterality Date    CARPAL TUNNEL RELEASE Bilateral     CATARACT REMOVAL Bilateral     HIP SURGERY Right     KNEE SURGERY Bilateral        Immunization History: There is no immunization history on file for this patient.     Active Problems:  Patient Active Problem List   Diagnosis Code    Cerebrovascular accident (CVA) ([facility] [address]       Isolation/Infection:   Isolation          No Isolation            Nurse Assessment:  Last Vital Signs: /83   Pulse 88   Temp 98.4 °F (36.9 °C) (Oral)   Resp 18   Ht 5' 6\" (1.676 m)   SpO2 96%   BMI 32.28 kg/m²     Last documented pain score (0-10 scale):    Last Weight:   Wt Readings from Last 1 Encounters:   05/15/19 200 lb (90.7 kg)     Mental Status:  alert    IV Access:  - None    Nursing Mobility/ADLs:  Walking   Assisted  Transfer  Assisted  Bathing  Assisted  Dressing  Assisted  Toileting  Independent  Feeding  Independent  Med Admin  Assisted  Med Delivery   whole    Wound Care Documentation and Therapy:        Elimination:  Continence:   · Bowel: No  · Bladder: No  Urinary Catheter: None   Colostomy/Ileostomy/Ileal Conduit: No       Date of Last BM: 19  No intake or output data in the 24 hours ending 05/15/19 1442  No intake/output data recorded. Safety Concerns: At Risk for Falls    Impairments/Disabilities:      None    Nutrition Therapy:  Current Nutrition Therapy:   - Oral Diet:  General    Routes of Feeding: Oral  Liquids: Thin Liquids  Daily Fluid Restriction: no  Last Modified Barium Swallow with Video (Video Swallowing Test): not done    Treatments at the Time of Hospital Discharge:   Respiratory Treatments: None  Oxygen Therapy:  is not on home oxygen therapy. Ventilator:    - No ventilator support    Rehab Therapies: Physical Therapy  Weight Bearing Status/Restrictions: No weight bearing restirctions  Other Medical Equipment (for information only, NOT a DME order):  None  Other Treatments:     Patient's personal belongings (please select all that are sent with patient):  {CHP DME Belongings:[id number]}    RN SIGNATURE:  {Esignature:[id number]}    CASE MANAGEMENT/SOCIAL WORK SECTION    Inpatient Status Date: ***    Readmission Risk Assessment Score:  Readmission Risk              Risk of Unplanned Readmission:        0           Discharging to Facility/ Agency   · Name: [place] [address]  · Address:  · Phone:[phone]  Fax:[phone]    Dialysis Facility (if applicable)   · Name:  · Address:  · Dialysis Schedule:  · Phone:  · Fax:    / signature: Electronically signed by Tamika Quigley RN on 5/18/19 at 5:36 PM    PHYSICIAN SECTION    Prognosis: Good    Condition at Discharge: Stable    Rehab Potential (if transferring to Rehab): Fair    Recommended Labs or Other Treatments After Discharge: see dc summary    Physician Certification: I certify the above information and transfer of Vivian Garcia  is necessary for the continuing treatment of the diagnosis listed and that she requires Home Care for  30 days.      Update Admission H&P: No change in H&P    PHYSICIAN SIGNATURE:  Electronically signed by Amarilis Valero MD on 5/18/19 at 11:32 AM

## 2019-05-15 NOTE — H&P
[facility] Neurology   [address] [place] [address]    HISTORY AND PHYSICAL EXAMINATION            Date:   5/15/2019  Patient name:  Alesha Cesar  Date of admission:  5/15/2019  1:41 PM  MRN:   [id number]  Account:  [de-identified]  YOB: 1950  PCP:    Romana Blumenthal, DO  Room:   29/29  Code Status:    No Order    Chief Complaint:     Not feeling herself    History Obtained From:     Patient/emr    History of Present Illness: The patient is a 80 y.o. Non-/non  female who presents with Neurologic Problem   and she is admitted to the hospital for the management of  stroke work -up. Patient is a transfer from [place] [address]. Her Mendoza Louder is 4:30 AM when she woke up to go to the bathroom and was her usual self and went back to bed however when she woke  up around 8 AM in morning to go to the bathroom and after coming she noticed she was feelingherself and could not do what she wanted to do. She  Picked up the phone to call EMS twice and had difficulty dialing the number. At [place] her NIH was 0 her CT head  two areas of low attenuation in right frontal lobe and right parietal lobe suggesting more remote infarct  She was loaded with aspirin 325 patient was out of the TPA window and had low nihss therefore no TPA was  Given and transferred to [address] [place] [address] for further management. [address] [place] [address] ED she was evaluated by the stroke team and her NIH was 1 for mild aphasia. She denied diplopia slurring speech and gait problem, headache, nausea vomiting, numbness tingling or any other neurological symptoms. .  Decision was made to admit her to general neurology for stroke workup. Patient was evaluated at bedside, a alert and oriented ×3.   Her vitals are   Vitals:    05/15/19 1338   BP: 134/83   Pulse: 88   Resp: 18   Temp: 98.4 °F (36.9 °C)   TempSrc: Oral   SpO2: 96%   Height: 5' 6\" (1.676 m)     She states she feels 50% better but still is not back to her baseline. Of note she is a past medical history of high blood pressure and is on Lopressor 25 mg daily and lisinopril 20 mg daily did not take her blood pressure medication today today morning      Past Medical History:     Past Medical History:   Diagnosis Date    Hypertension         Past Surgical History:     Past Surgical History:   Procedure Laterality Date    CARPAL TUNNEL RELEASE Bilateral     CATARACT REMOVAL Bilateral     HIP SURGERY Right     KNEE SURGERY Bilateral         Medications Prior to Admission:     Prior to Admission medications    Medication Sig Start Date End Date Taking? Authorizing Provider   metoprolol tartrate (LOPRESSOR) 25 MG tablet Take 25 mg by mouth daily    Historical Provider, MD   alendronate (FOSAMAX) 70 MG tablet Take 70 mg by mouth every 7 days    Historical Provider, MD   lisinopril (PRINIVIL;ZESTRIL) 20 MG tablet Take 20 mg by mouth daily    Historical Provider, MD   aspirin 81 MG tablet Take 81 mg by mouth daily    Historical Provider, MD   vitamin D (CHOLECALCIFEROL) 1000 UNIT TABS tablet Take 1,000 Units by mouth daily    Historical Provider, MD   Omega-3 Fatty Acids (OMEGA-3 FISH OIL) 1000 MG CAPS Take 1,040 mg by mouth daily    Historical Provider, MD        Allergies:     No known allergies    Social History:     Tobacco:    reports that she has never smoked. She has never used smokeless tobacco.  Alcohol:      has no alcohol history on file. Drug Use:  has no drug history on file. Family History:     History reviewed. No pertinent family history.     Review of Systems:     ROS:  Constitutional  Negative for fever and chills    HEENT  Negative for ear discharge, ear pain, nosebleed    Eyes  Negative for photophobia, pain and discharge    Respiratory  Negative for hemoptysis and sputum    Cardiovascular  Negative for orthopnea, claudication and PND    Gastrointestinal  Negative for abdominal pain, diarrhea, blood in stool    Musculoskeletal  Negative for joint pain, negative for myalgia    Skin  Negative for rash or itching    Endo/heme/allergies  Negative for polydipsia, environmental allergy    Psychiatric/behavioral  Negative for suicidal ideation. Patient is not anxious        Physical Exam:   /83   Pulse 88   Temp 98.4 °F (36.9 °C) (Oral)   Resp 18   Ht 5' 6\" (1.676 m)   SpO2 96%   BMI 32.28 kg/m²   Temp (24hrs), Av.3 °F (36.8 °C), Min:98.2 °F (36.8 °C), Max:98.4 °F (36.9 °C)    No results for input(s): POCGLU in the last 72 hours. No intake or output data in the 24 hours ending 05/15/19 1456        EXAMINATION:  GENERAL    SKIN  Appears comfortable and in no distress    No gross lesions, rashes, bruising or bleeding on exposed skin area   HEENT NC/ AT  Eyes:no icterus, redness, pupils equal and reactive, extraocular eye movements intact, conjunctiva clear  Ear: normal external ear, no discharge, hearing intact  Nose:  no drainage noted  Mouth: mucous membranes moist   NECK  LUNGS  Supple and no bruits heard  Clear to auscultation,b/l   Cardiovascular  Abdomen  S1, S2 heard; radial pulse intact,RRR  Soft,non-tender,no organomegaly,BS+   MENTAL STATUS:  Alert, oriented, intact memory, no confusion, normal speech, normal language, no hallucination or delusion   CRANIAL NERVES: II     -       Pupils reactive b/l visual acuity: 20/30 OU; Fundus exam: intact venous pulsations;  Visual fields intact to confrontation  III,IV,VI -  EOMs full, no afferent defect, no                      SOWMYA, no ptosis  V     -     Normal facial sensation  VII    -     Normal facial symmetry  VIII   -     Intact hearing  IX,X -     Symmetrical palate  XI    -     Symmetrical shoulder shrug  XII   -     Midline tongue, no atrophy    MOTOR FUNCTION:  Bulk R side:Normal            L side:Normal  Tone  R side:Normal            L side:Normal   Power 5/5 in upper and lower extremties;      no involuntary movements, no tremor     SENSORY FUNCTION:                      Extremities: Touch     R side:Normal                 L side:Normal     Pain        R side:Normal                  L side:Normal  Vibration  R side:Normal                  L side:Normal    Proprioception    R side:Normal                             L side:Normal    Peripheral pulses palpable, no pedal edema or calf pain with palpation   CEREBELLAR FUNCTION:  Heel to Shin:     Right side:  normal                             Left side:  normal    Finger to Nose:   Right:  normal                              Left:  normal            REFLEX FUNCTION:  Symmetric, no perverted reflex,      Right Bicep:  2+  Left Bicep:  2+  Right Knee:  2+  Left Knee:  2+    Babinski sign   Right side:Downgoing                          Left side   :Downgoing   STATION and GAIT  Not tested       NIH Stroke Scale Total (if not done complete detailed one below):    1a.  Level of consciousness:  0 - alert; keenly responsive  1b. Level of consciousness questions:  0 - answers both questions correctly  1c. Level of consciousness questions:  0 - performs both tasks correctly  2. Best Gaze:  0 - normal  3. Visual:  0 - no visual loss  4. Facial Palsy:  0 - normal symmetric movement  5a. Motor left arm:  0 - no drift, limb holds 90 (or 45) degrees for full 10 seconds  5b. Motor right arm:  0 - no drift, limb holds 90 (or 45) degrees for full 10 seconds  6a. Motor left le - no drift; leg holds 30 degree position for full 5 seconds  6b. Motor right le - no drift; leg holds 30 degree position for full 5 seconds  7. Limb Ataxia:  0 - absent  8. Sensory:  0 - normal; no sensory loss  9. Best Language:  1 - mild to moderate aphasia; some obvious loss of fluency or facility of comprehension without significant limitation on ideas expressed or form of expression. Reduction of speech and/or comprehension, however, makes conversation about provided materials difficult or impossible.   For example, in conversation about provided materials, examiner can identify picture or naming card content from patient's response. 10.  Dysarthria:  0 - normal  11.   Extinction and Inattention:  0 - no abnormality      Total-1    Investigations:      Laboratory Testing:  Recent Results (from the past 24 hour(s))   CBC auto differential    Collection Time: 05/15/19  8:43 AM   Result Value Ref Range    WBC 7.1 3.5 - 11.3 k/uL    RBC 4.32 3.95 - 5.11 m/uL    Hemoglobin 13.9 11.9 - 15.1 g/dL    Hematocrit 43.1 36.3 - 47.1 %    MCV 99.8 82.6 - 102.9 fL    MCH 32.2 25.2 - 33.5 pg    MCHC 32.3 28.4 - 34.8 g/dL    RDW 12.4 11.8 - 14.4 %    Platelets 804 023 - 743 k/uL    MPV 9.9 8.1 - 13.5 fL    NRBC Automated 0.0 0.0 per 100 WBC    Differential Type NOT REPORTED     Seg Neutrophils 62 36 - 65 %    Lymphocytes 27 24 - 43 %    Monocytes 9 3 - 12 %    Eosinophils % 1 1 - 4 %    Basophils 1 0 - 2 %    Immature Granulocytes 0 0 %    Segs Absolute 4.38 1.50 - 8.10 k/uL    Absolute Lymph # 1.95 1.10 - 3.70 k/uL    Absolute Mono # 0.62 0.10 - 1.20 k/uL    Absolute Eos # 0.09 0.00 - 0.44 k/uL    Basophils # 0.05 0.00 - 0.20 k/uL    Absolute Immature Granulocyte <0.03 0.00 - 0.30 k/uL    WBC Morphology NOT REPORTED     RBC Morphology NOT REPORTED     Platelet Estimate NOT REPORTED    Comprehensive metabolic panel    Collection Time: 05/15/19  8:43 AM   Result Value Ref Range    Glucose 121 (H) 70 - 99 mg/dL    BUN 12 8 - 23 mg/dL    CREATININE 0.85 0.50 - 0.90 mg/dL    Bun/Cre Ratio 14 9 - 20    Calcium 9.5 8.6 - 10.4 mg/dL    Sodium 141 135 - 144 mmol/L    Potassium 4.1 3.7 - 5.3 mmol/L    Chloride 101 98 - 107 mmol/L    CO2 26 20 - 31 mmol/L    Anion Gap 14 9 - 17 mmol/L    Alkaline Phosphatase 60 35 - 104 U/L    ALT 15 5 - 33 U/L    AST 13 <32 U/L    Total Bilirubin 0.56 0.3 - 1.2 mg/dL    Total Protein 7.5 6.4 - 8.3 g/dL    Alb 4.1 3.5 - 5.2 g/dL    Albumin/Globulin Ratio 1.2 1.0 - 2.5    GFR Non-African American >60 >60 mL/min    GFR African American >60 >60 mL/min

## 2019-05-15 NOTE — ED PROVIDER NOTES
[facility]     Emergency Department     Faculty Attestation    I performed a history and physical examination of the patient and discussed management with the resident. I have reviewed and agree with the residents findings including all diagnostic interpretations, and treatment plans as written at the time of my review. Any areas of disagreement are noted on the chart. I was personally present for the key portions of any procedures. I have documented in the chart those procedures where I was not present during the key portions. Documentation of the HPI, Physical Exam and Medical Decision Making performed by scribyareli is based on my personal performance of the HPI, PE and MDM. For Physician Assistant/ Nurse Practitioner cases/documentation I have personally evaluated this patient and have completed at least one if not all key elements of the E/M (history, physical exam, and MDM). Additional findings are as noted. Patient presents to emergency department after being evaluated at our facility for slurred speech. This began yesterday at 12 noon. Initial CT scan did not show any hemorrhage. The patient was transferred for neurological and stroke evaluation. (Please note that portions of this note were completed with a voice recognition program.  Efforts were made to edit the dictations but occasionally words are mis-transcribed.)    Lowell Ott.  Reena Becerra MD, [address] [facility]  Attending Emergency Medicine Physician        Kristian Chandra MD  05/15/19 2132

## 2019-05-15 NOTE — FLOWSHEET NOTE
[facility]     Emergency/Trauma Note    PATIENT NAME: Zoe Malcolm    Shift date: 5/15/19  Shift day: Wednesday   Shift # 1    Room # 29/29   Name: Romel Gilbert            Age: 80 y.o. Gender: female          Rastafarian: [address] y of Hindu: [address] [facility] [place]  Trauma/Incident type: [address] Date & Time: 5/15/2019  1:41 PM    PATIENT/EVENT DESCRIPTION:  Romel Gilbert is a 80 y.o. female who arrived via ground ambulance as transfer from [facility]. Per report the patient is being evaluated for a possible stroke due to weakness and some aphasia. She is diagnosed with a TIA and further tests are pending. Pt to be admitted to 29/29. SPIRITUAL ASSESSMENT/INTERVENTION:     05/15/19 1409   Encounter Summary   Services provided to: Patient   Place of [address] [place] [address]   Contact Rastafarian No   Continue Visiting   (5/15/19)   Complexity of Encounter Moderate   Length of Encounter 30 minutes   Spiritual Assessment Completed Yes   Crisis   Type   (Stroke Consult)   Assessment Calm; Approachable; Hopeful;Coping   Intervention Active listening;Explored feelings, thoughts, concerns;Nurtured hope;Prayer;Sustaining presence/ Ministry of presence; Discussed belief system/Pentecostalism practices/tyler   Outcome Acceptance;Comfort;Expressed gratitude;Coping;Less anxious, less agitated;Encouraged; Hopeful     I spoke to the patient who indicated that her symptoms seem to be resolving. I asked about her thoughts and feelings and she expressed peace and no worries or concerns. She is feeling better and wants to go home. I inquired about her support and she told me that she has 3 sons who are very attentive to her and she lives with one of them. One son lives out of town and she says he is very worried about her. I inquired about how she danielle and she told me about her [address] which sustains her.  She indicates that she would welcome a visit from Fr. Sharon Avila for [address]. She was receptive to my visit and accepted my offer of prayer. PATIENT BELONGINGS:  No belongings noted    ANY BELONGINGS OF SIGNIFICANT VALUE NOTED:  No.    REGISTRATION STAFF NOTIFIED? Yes    WHAT IS YOUR SPIRITUAL CARE PLAN FOR THIS PATIENT?:  Chaplains are available for further spiritual and emotional support as requested by the patient.        Electronically signed by Theo Dubois on 5/15/2019 at 2:13 PM.  [facility]  [phone]

## 2019-05-15 NOTE — ED NOTES
MRI sheet completed and faxed to department with action completed \"OK\"     Anette Montoya RN  05/15/19 3187

## 2019-05-15 NOTE — PROGRESS NOTES
Patient admitted on to unit via stretcher in stable condition. Family present at bedside. No valuables with patient. Oriented to unit, room, bed controls, and call system.

## 2019-05-15 NOTE — ED PROVIDER NOTES
Social History     Socioeconomic History    Marital status:      Spouse name: None    Number of children: None    Years of education: None    Highest education level: None   Occupational History    None   Social Needs    Financial resource strain: None    Food insecurity:     Worry: None     Inability: None    Transportation needs:     Medical: None     Non-medical: None   Tobacco Use    Smoking status: Never Smoker    Smokeless tobacco: Never Used   Substance and Sexual Activity    Alcohol use: None    Drug use: None    Sexual activity: None   Lifestyle    Physical activity:     Days per week: None     Minutes per session: None    Stress: None   Relationships    Social connections:     Talks on phone: None     Gets together: None     Attends Latter day service: None     Active member of club or organization: None     Attends meetings of clubs or organizations: None     Relationship status: None    Intimate partner violence:     Fear of current or ex partner: None     Emotionally abused: None     Physically abused: None     Forced sexual activity: None   Other Topics Concern    None   Social History Narrative    None       REVIEW OF SYSTEMS     Review of Systems   Constitutional: Negative for activity change, diaphoresis and fever. HENT: Negative for congestion, ear discharge, ear pain and sore throat. Eyes: Negative for discharge and redness. Respiratory: Negative for apnea, chest tightness and shortness of breath. Cardiovascular: Negative for chest pain. Gastrointestinal: Negative for abdominal pain. Skin: Negative for pallor and rash. Neurological: Positive for speech difficulty. Negative for dizziness, seizures, syncope, facial asymmetry, weakness and headaches. All other systems reviewed and are negative. Except as noted above the remainder of the review of systems was reviewed and is negative.    PHYSICAL EXAM    (up to 7 for level 4, 8 or more for level 5) following components:       Result Value    Glucose 121 (*)     All other components within normal limits   CBC WITH AUTO DIFFERENTIAL   PROTIME-INR   APTT   TROPONIN       All other labs were within normal range or not returned as of this dictation. EMERGENCY DEPARTMENT COURSE andMedical Decision Making: Total critical care time caring for this patient with life threatening, unstable organ failure, including direct patient contact, management of life support systems, review of data including imaging and labs, discussions with other team members and physicians at least 30 minutes so far today, excluding separately billable procedures. NIHSS = 0  No tPA will be considered due to low stroke score and initial onset of symptoms > 4.5 hours. CONSULTS:   21 :  Spoke with Dr. Michael Razo (neurology - [facility]) about all aspects of patient's case. Will evaluate in ED.    1019:  Spoke with Dr. Wayne Cavanaugh (emergency - [facility]) about all aspects of patient's case. Transfer to ED. CLINICAL       1.  Cerebrovascular accident (CVA), unspecified mechanism ([address] Stable         DISPOSITION/PLAN   DISPOSITION              (Please note that portions of this note were completed with a voice recognition program.  Efforts were made to edit the dictations but occasionallywords are mis-transcribed.)      Read DO Luis (electronically signed)  Attending Emergency Department Provider      Quentin Benitez DO  05/17/19 5750

## 2019-05-15 NOTE — ED PROVIDER NOTES
[address] [facility]  Emergency Department  Emergency Medicine Resident Sign-out     Care of Harshad Crocker was assumed from Dr. Miriam Rizo and is being seen for Neurologic Problem  . The patient's initial evaluation and plan have been discussed with the prior provider who initially evaluated the patient. EMERGENCY DEPARTMENT COURSE / MEDICAL DECISION MAKING:       MEDICATIONS GIVEN:  Orders Placed This Encounter   Medications    0.9 % sodium chloride bolus    iohexol (OMNIPAQUE 350) solution 75 mL       LABS / RADIOLOGY:     Labs Reviewed   URINE RT REFLEX TO CULTURE       Ct Head Wo Contrast    Result Date: 5/15/2019  EXAMINATION: CT OF THE HEAD WITHOUT CONTRAST  5/15/2019 9:32 am TECHNIQUE: CT of the head was performed without the administration of intravenous contrast. Dose modulation, iterative reconstruction, and/or weight based adjustment of the mA/kV was utilized to reduce the radiation dose to as low as reasonably achievable. COMPARISON: None. HISTORY: ORDERING SYSTEM PROVIDED HISTORY: dysphasia TECHNOLOGIST PROVIDED HISTORY: FINDINGS: BRAIN/VENTRICLES: There is no evidence of acute hemorrhage, mass effect or midline shift. There is low-attenuation in the region injection to the right frontal horn in the temporal lobe region suggesting more remote stroke. There is also an area in the right parietal region of low attenuation and encephalomalacia which may represent another remote area of infarct shin. There is mild diffuse atrophic age-related changes of the brain identified. ORBITS: The visualized portion of the orbits demonstrate no acute abnormality. SINUSES: The visualized paranasal sinuses and mastoid air cells demonstrate no acute abnormality. SOFT TISSUES/SKULL:  No acute abnormality of the visualized skull or soft tissues. No acute intracranial hemorrhage identified.   2 areas of low attenuation in the right frontal lobe in the periventricular region and right parietal lobe suggesting more remote cerebral infraction. Xr Chest Portable    Result Date: 5/15/2019  EXAMINATION: ONE XRAY VIEW OF THE CHEST 5/15/2019 9:32 am COMPARISON: None. HISTORY: ORDERING SYSTEM PROVIDED HISTORY: weak TECHNOLOGIST PROVIDED HISTORY: weak FINDINGS: Single-view chest shows no evidence of focal infiltrate, effusion, pneumothorax. There is very mild prominence of the pulmonary vasculature which may be technical in nature. The heart mediastinum appear normal. Trachea is midline. The visualized bony thorax shows no acute abnormality     Slight prominence of the pulmonary vasculature otherwise no acute findings of the chest.     Cta Neck W Contrast    Result Date: 5/15/2019  EXAMINATION: CTA OF THE HEAD WITH CONTRAST; CTA OF THE NECK 5/15/2019 2:21 pm: TECHNIQUE: CTA of the head/brain was performed with the administration of intravenous contrast. Multiplanar reformatted images are provided for review. MIP images are provided for review. Dose modulation, iterative reconstruction, and/or weight based adjustment of the mA/kV was utilized to reduce the radiation dose to as low as reasonably achievable.; CTA of the neck was performed with the administration of intravenous contrast. Multiplanar reformatted images are provided for review. MIP images are provided for review. Stenosis of the internal carotid arteries measured using NASCET criteria. Dose modulation, iterative reconstruction, and/or weight based adjustment of the mA/kV was utilized to reduce the radiation dose to as low as reasonably achievable. COMPARISON: None. HISTORY: ORDERING SYSTEM PROVIDED HISTORY: stroke work up, expressive aphasia TECHNOLOGIST PROVIDED HISTORY: FINDINGS: CTA NECK: AORTIC ARCH/ARCH VESSELS: There is a normal branch pattern of the aortic arch. No significant stenosis is seen of the innominate artery or subclavian arteries.  CAROTID ARTERIES: The common carotid arteries are normal in appearance without evidence of a flow limiting stenosis. The internal carotid arteries are normal in appearance without evidence of a flow limiting stenosis by NASCET criteria. No dissection or arterial injury is seen. VERTEBRAL ARTERIES: The vertebral arteries both arise from the subclavian arteries and are normal in caliber without evidence of flow limiting stenosis or dissection. SOFT TISSUES: The lung apices are clear. No cervical or superior mediastinal lymphadenopathy. The visualized portion of the larynx and pharynx appear unremarkable. The parotid, submandibular and thyroid glands demonstrate no acute abnormality. BONES: There are severe degenerative changes of the spine. CTA HEAD: ANTERIOR CIRCULATION: The internal carotid arteries are normal in course and caliber without focal stenosis. The anterior cerebral and middle cerebral arteries demonstrate no focal stenosis. POSTERIOR CIRCULATION: The posterior cerebral arteries demonstrate no focal stenosis. The vertebral and basilar arteries appear unremarkable. BRAIN: The noncontrast head CT from 9:34 a.m. is being re-reviewed. There are acute infarcts within the left parietal and left occipital lobes. There is also a probable acute infarct within the right occipital lobe. Chronic infarcts are seen in the right frontal and right parietal lobes. Acute and chronic cerebral infarcts. No flow limiting stenosis or large vessel occlusion detected within the head or neck. Findings were discussed with Emmy Mcintosh at 2:56 pm on 5/15/2019. RECOMMENDATIONS: MRI brain. Cta Head W Contrast    Result Date: 5/15/2019  EXAMINATION: CTA OF THE HEAD WITH CONTRAST; CTA OF THE NECK 5/15/2019 2:21 pm: TECHNIQUE: CTA of the head/brain was performed with the administration of intravenous contrast. Multiplanar reformatted images are provided for review. MIP images are provided for review.  Dose modulation, iterative reconstruction, and/or weight based adjustment of the mA/kV was utilized to reduce the

## 2019-05-15 NOTE — ED NOTES
[address] Christina Aguila for neuro at [facility] per Dr. Polina Vann request.     Juan Carlos Belcher A Reser  05/15/19 1000

## 2019-05-16 LAB
ABSOLUTE EOS #: 0.14 K/UL (ref 0–0.44)
ABSOLUTE IMMATURE GRANULOCYTE: <0.03 K/UL (ref 0–0.3)
ABSOLUTE LYMPH #: 1.63 K/UL (ref 1.1–3.7)
ABSOLUTE MONO #: 0.6 K/UL (ref 0.1–1.2)
ANION GAP SERPL CALCULATED.3IONS-SCNC: 12 MMOL/L (ref 9–17)
BASOPHILS # BLD: 1 % (ref 0–2)
BASOPHILS ABSOLUTE: 0.04 K/UL (ref 0–0.2)
BUN BLDV-MCNC: 11 MG/DL (ref 8–23)
BUN/CREAT BLD: ABNORMAL (ref 9–20)
CALCIUM SERPL-MCNC: 9.1 MG/DL (ref 8.6–10.4)
CHLORIDE BLD-SCNC: 102 MMOL/L (ref 98–107)
CO2: 26 MMOL/L (ref 20–31)
CREAT SERPL-MCNC: 0.71 MG/DL (ref 0.5–0.9)
DIFFERENTIAL TYPE: NORMAL
EOSINOPHILS RELATIVE PERCENT: 2 % (ref 1–4)
ESTIMATED AVERAGE GLUCOSE: 131 MG/DL
GFR AFRICAN AMERICAN: >60 ML/MIN
GFR NON-AFRICAN AMERICAN: >60 ML/MIN
GFR SERPL CREATININE-BSD FRML MDRD: ABNORMAL ML/MIN/{1.73_M2}
GFR SERPL CREATININE-BSD FRML MDRD: ABNORMAL ML/MIN/{1.73_M2}
GLUCOSE BLD-MCNC: 113 MG/DL (ref 70–99)
HBA1C MFR BLD: 6.2 % (ref 4–6)
HCT VFR BLD CALC: 39.2 % (ref 36.3–47.1)
HEMOGLOBIN: 12.5 G/DL (ref 11.9–15.1)
IMMATURE GRANULOCYTES: 0 %
LV EF: 45 %
LVEF MODALITY: NORMAL
LYMPHOCYTES # BLD: 26 % (ref 24–43)
MCH RBC QN AUTO: 31.6 PG (ref 25.2–33.5)
MCHC RBC AUTO-ENTMCNC: 31.9 G/DL (ref 28.4–34.8)
MCV RBC AUTO: 99.2 FL (ref 82.6–102.9)
MONOCYTES # BLD: 10 % (ref 3–12)
NRBC AUTOMATED: 0 PER 100 WBC
PDW BLD-RTO: 12.5 % (ref 11.8–14.4)
PLATELET # BLD: 241 K/UL (ref 138–453)
PLATELET ESTIMATE: NORMAL
PMV BLD AUTO: 9.8 FL (ref 8.1–13.5)
POTASSIUM SERPL-SCNC: 3.9 MMOL/L (ref 3.7–5.3)
RBC # BLD: 3.95 M/UL (ref 3.95–5.11)
RBC # BLD: NORMAL 10*6/UL
SEG NEUTROPHILS: 61 % (ref 36–65)
SEGMENTED NEUTROPHILS ABSOLUTE COUNT: 3.75 K/UL (ref 1.5–8.1)
SODIUM BLD-SCNC: 140 MMOL/L (ref 135–144)
WBC # BLD: 6.2 K/UL (ref 3.5–11.3)
WBC # BLD: NORMAL 10*3/UL

## 2019-05-16 PROCEDURE — 97166 OT EVAL MOD COMPLEX 45 MIN: CPT

## 2019-05-16 PROCEDURE — 97530 THERAPEUTIC ACTIVITIES: CPT

## 2019-05-16 PROCEDURE — 36415 COLL VENOUS BLD VENIPUNCTURE: CPT

## 2019-05-16 PROCEDURE — 95816 EEG AWAKE AND DROWSY: CPT

## 2019-05-16 PROCEDURE — 92523 SPEECH SOUND LANG COMPREHEN: CPT

## 2019-05-16 PROCEDURE — 6370000000 HC RX 637 (ALT 250 FOR IP): Performed by: STUDENT IN AN ORGANIZED HEALTH CARE EDUCATION/TRAINING PROGRAM

## 2019-05-16 PROCEDURE — 85025 COMPLETE CBC W/AUTO DIFF WBC: CPT

## 2019-05-16 PROCEDURE — 93306 TTE W/DOPPLER COMPLETE: CPT

## 2019-05-16 PROCEDURE — 80048 BASIC METABOLIC PNL TOTAL CA: CPT

## 2019-05-16 PROCEDURE — 95819 EEG AWAKE AND ASLEEP: CPT | Performed by: PSYCHIATRY & NEUROLOGY

## 2019-05-16 PROCEDURE — 6360000002 HC RX W HCPCS: Performed by: STUDENT IN AN ORGANIZED HEALTH CARE EDUCATION/TRAINING PROGRAM

## 2019-05-16 PROCEDURE — 2580000003 HC RX 258: Performed by: STUDENT IN AN ORGANIZED HEALTH CARE EDUCATION/TRAINING PROGRAM

## 2019-05-16 PROCEDURE — 2580000003 HC RX 258: Performed by: PSYCHIATRY & NEUROLOGY

## 2019-05-16 PROCEDURE — 97535 SELF CARE MNGMENT TRAINING: CPT

## 2019-05-16 PROCEDURE — 2060000000 HC ICU INTERMEDIATE R&B

## 2019-05-16 PROCEDURE — 97162 PT EVAL MOD COMPLEX 30 MIN: CPT

## 2019-05-16 PROCEDURE — 99223 1ST HOSP IP/OBS HIGH 75: CPT | Performed by: PSYCHIATRY & NEUROLOGY

## 2019-05-16 RX ADMIN — VITAMIN D, TAB 1000IU (100/BT) 1000 UNITS: 25 TAB at 09:49

## 2019-05-16 RX ADMIN — ENOXAPARIN SODIUM 40 MG: 40 INJECTION, SOLUTION INTRAVENOUS; SUBCUTANEOUS at 09:49

## 2019-05-16 RX ADMIN — METOPROLOL TARTRATE 25 MG: 25 TABLET ORAL at 09:49

## 2019-05-16 RX ADMIN — OMEGA-3-ACID ETHYL ESTERS 1 G: 1 CAPSULE, LIQUID FILLED ORAL at 09:49

## 2019-05-16 RX ADMIN — Medication 10 ML: at 22:06

## 2019-05-16 RX ADMIN — LISINOPRIL 20 MG: 20 TABLET ORAL at 09:49

## 2019-05-16 RX ADMIN — Medication 10 ML: at 09:00

## 2019-05-16 RX ADMIN — ATORVASTATIN CALCIUM 80 MG: 80 TABLET, FILM COATED ORAL at 22:05

## 2019-05-16 RX ADMIN — ASPIRIN 325 MG: 325 TABLET, COATED ORAL at 09:49

## 2019-05-16 RX ADMIN — Medication 10 ML: at 22:07

## 2019-05-16 ASSESSMENT — PAIN SCALES - GENERAL
PAINLEVEL_OUTOF10: 0

## 2019-05-16 NOTE — CONSULTS
Endovascular Neurosurgery Note for  Stroke Alert @ 13:23  5/15/2019 1:22 PM  Pt Name: Faye Donato  MRN: [id number]  [place]: 1950  Date of evaluation: 5/15/2019  Primary Care Physician: Jesse Hood DO    Faye Donato is a 80 y.o. female who [facility] symptoms since 22:30 the night before of expressive aphasia not improving when she woke up in am.  NIH 0 but noted chronic right frontal and parietal cortical strokes and early left parietal cva changes. No iv tpa due to last well more than 4.5 hrs.     nih 1 on arrival cta no large vessel occlusion. Allergies  is allergic to no known allergies. Medications  Prior to Admission medications    Medication Sig Start Date End Date Taking?  Authorizing Provider   metoprolol tartrate (LOPRESSOR) 25 MG tablet Take 25 mg by mouth daily    Historical Provider, MD   alendronate (FOSAMAX) 70 MG tablet Take 70 mg by mouth every 7 days    Historical Provider, MD   lisinopril (PRINIVIL;ZESTRIL) 20 MG tablet Take 20 mg by mouth daily    Historical Provider, MD   aspirin 81 MG tablet Take 81 mg by mouth daily    Historical Provider, MD   vitamin D (CHOLECALCIFEROL) 1000 UNIT TABS tablet Take 1,000 Units by mouth daily    Historical Provider, MD   Omega-3 Fatty Acids (OMEGA-3 FISH OIL) 1000 MG CAPS Take 1,040 mg by mouth daily    Historical Provider, MD    Scheduled Meds:   sodium chloride flush  10 mL Intravenous 2 times per day    lisinopril  20 mg Oral Daily    metoprolol tartrate  25 mg Oral Daily    vitamin D  1,000 Units Oral Daily    sodium chloride flush  10 mL Intravenous 2 times per day    enoxaparin  40 mg Subcutaneous Daily    aspirin  325 mg Oral Daily    atorvastatin  80 mg Oral Nightly    omega-3 acid ethyl esters  1 g Oral Daily     Continuous Infusions:  PRN Meds:.sodium chloride flush, acetaminophen, sodium chloride flush, magnesium hydroxide, ondansetron, hydrALAZINE,

## 2019-05-16 NOTE — PROGRESS NOTES
Physical Therapy    Facility/Department: [facility] NEURO  Initial Assessment    NAME: Casandra Canales  : 1950  MRN: [id number]    Date of Service: 2019    Discharge Recommendations:Further therapy recommended at discharge. Chief Complaint   Patient presents with    Neurologic Problem     The patient is a 80 y.o. Non-/non  female who presents with Neurologic Problem   and she is admitted to the hospital for the management of  stroke work -up. Patient is a transfer from [facility]. Her [place] January is 4:30 AM when she woke up to go to the bathroom and was her usual self and went back to bed however when she woke  up around 8 AM in morning to go to the bathroom and after coming she noticed she was feelingherself and could not do what she wanted to do. She  Picked up the phone to call EMS twice and had difficulty dialing the number. At [place] her NIH was 0 her CT head  two areas of low attenuation in right frontal lobe and right parietal lobe suggesting more remote infarct  She was loaded with aspirin 325 patient was out of the TPA window and had low nihss therefore no TPA was  Given and transferred to [facility] for further management.  [id number] [place] [address] ED she was evaluated by the stroke team and her NIH was 1 for mild aphasia. She denied diplopia slurring speech and gait problem, headache, nausea vomiting, numbness tingling or any other neurological symptoms. .  Decision was made to admit her to general neurology for stroke workup. Assessment   Body structures, Functions, Activity limitations: Decreased functional mobility ; Decreased balance;Decreased safe awareness;Decreased ADL status; Decreased endurance;Decreased high-level IADLs;Decreased strength  Assessment: Pt ambulates 100ft with CGA and RW. Pt displays decreased steadiness with ambulation at this time and decreased balance reactions and decreased ability to complete functional tasks and transfers independently.  Pt would benefit from continued skilled physical therapy after discharge to work on balance reactions and gait training as well as strengthening. Prognosis: Good  Decision Making: Medium Complexity  REQUIRES PT FOLLOW UP: Yes  Activity Tolerance  Activity Tolerance: Patient Tolerated treatment well       Patient Diagnosis(es): The encounter diagnosis was Cerebrovascular accident (CVA), unspecified mechanism ([address] has a past medical history of Hypertension. has a past surgical history that includes knee surgery (Bilateral); hip surgery (Right); Carpal tunnel release (Bilateral); and Cataract removal (Bilateral). Restrictions  Restrictions/Precautions  Restrictions/Precautions: Up as Tolerated  Required Braces or Orthoses?: No  Position Activity Restriction  Other position/activity restrictions: up with assistance  Vision/Hearing  Vision: Impaired  Vision Exceptions: Wears glasses for reading  Hearing: Within functional limits     Subjective  General  Patient assessed for rehabilitation services?: Yes  Family / Caregiver Present: No  Follows Commands: Within Functional Limits  General Comment  Comments: Pt requires VC to complete functional tasks properly. Subjective  Subjective: Pt supine in bed upon arrival. Pt and RN agreeable to PT. Pt very pleasant throughout. Pain Screening  Patient Currently in Pain: No  Pain Assessment  Pain Assessment: 0-10  Pain Level: 0  Patient's Stated Pain Goal: No pain  Vital Signs  Patient Currently in Pain: No       Orientation  Orientation  Overall Orientation Status: Impaired  Orientation Level: Oriented to place;Oriented to time(Pt unable to provide appropriate .  Pt requires increased time to answer question. )  Social/Functional History  Social/Functional History  Lives With: Son(adult son)  Type of Home: House  Home Layout: One level, Laundry in basement(son does all laundry tasks, pt does not access the basement level)  Home Access: Stairs to enter with rails  Entrance Stairs - Number of Steps: 6 steps  Entrance Stairs - Rails: Both  Bathroom Shower/Tub: Walk-in shower  Bathroom Toilet: Standard  Bathroom Equipment: Built-in shower seat  Bathroom Accessibility: Accessible  Home Equipment: Rolling walker, Cane  Receives Help From: Family(Pt reports son is physically able to assist if needed)  ADL Assistance: Independent  Homemaking Assistance: Independent  Homemaking Responsibilities: Yes(pt reports meal prep responsibility however son performs most IADLs and is able to complete all if needed)  Ambulation Assistance: Independent(pt reports use of AD only following prior R hip sx, pt states no recent use of AD required)  Transfer Assistance: Independent  Active : Yes  Mode of Transportation: [employer]  Occupation: Retired  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 )    Joint Mobility  Spine: WFL  ROM RLE: WFL  ROM LLE: WFL  ROM RUE: WFL  ROM LUE: WFL  Strength RLE  Strength RLE: WFL  R Hip Flexion: 4-/5  R Hip ABduction: 4-/5  R Hip ADduction: 4-/5  Strength LLE  Strength LLE: WFL  Strength RUE  Comment: See OT documentation. Strength LUE  Comment: See OT documentation. Tone RLE  RLE Tone: Normotonic  Tone LLE  LLE Tone: Normotonic  Motor Control  Gross Motor?: WNL  Coordination  Rapid Alternating Movements: Normal  Sensation  Overall Sensation Status: WNL  Bed mobility  Supine to Sit: Supervision  Sit to Supine: Supervision  Scooting: Supervision  Transfers  Sit to Stand: Supervision  Stand to sit: Stand by assistance  Bed to Chair: Contact guard assistance(Activity did not occur. )  Stand Pivot Transfers: Stand by assistance  Comment: Pt requires VC to complete sit to stand transfers properly with proper use of AD and proper use of UE's. Ambulation  Ambulation?: Yes  More Ambulation?: Yes  Ambulation 1  Surface: level tile  Device: No Device  Assistance: Minimal assistance  Quality of Gait: Pt very unsteady with decreased cordell and decreased step length bilaterally. Distance: 10ft  Comments: Pt very unsteady and reaching out for furniture to help with balance. Ambulation 2  Surface - 2: level tile  Device 2: Rolling Walker  Assistance 2: Contact guard assistance  Quality of Gait 2: Pt displays decreased cordell, decreased step length bilaterally. Distance: 100ft  Comments: Pt displays improved steadiness and improved safety with use of RW. Stairs/Curb  Stairs?: No  Gait Deviations  Gait Deviations: Slow Cordell; Increased KAMALJIT; Decreased step length     Balance  Posture: Fair  Sitting - Static: Good  Sitting - Dynamic: Good  Standing - Static: Fair  Standing - Dynamic: Fair;-        Plan   Plan  Times per week: 5x  Current Treatment Recommendations: Strengthening, Transfer Training, Endurance Training, Neuromuscular Re-education, Patient/Caregiver Education & Training, Equipment Evaluation, Education, & procurement, Balance Training, Gait Training, Home Exercise Program, Functional Mobility Training, Stair training, Safety Education & Training  Safety Devices  Type of devices: Call light within reach, Gait belt, Left in bed  Restraints  Initially in place: No      AM-PAC Score  AM-PAC Inpatient Mobility Raw Score : 17  AM-PAC Inpatient T-Scale Score : 42.13  Mobility Inpatient CMS 0-100% Score: 50.57  Mobility Inpatient CMS G-Code Modifier : CK          Goals  Short term goals  Time Frame for Short term goals: 14visits  Short term goal 1: Pt to ambulate with least resistive AD 200ft independently. Short term goal 2: Pt to negotiate stairs with step to pattern with bilateral handrails with supervision. Short term goal 3: Pt to display improved dynamic standing balance to good level in order to decrease risk for falls.    Short term goal 4: Pt to complete all functional transfers independently to increase patient safety.         Therapy Time   Individual Concurrent Group Co-treatment   Time In 1333         Time Out 1352         Minutes 19         Timed Code Treatment Minutes: 8 Minutes       Roseann Cowan PT

## 2019-05-16 NOTE — PROGRESS NOTES
Occupational Therapy   Occupational Therapy Initial Assessment  Date: 2019   Patient Name: Vee Villavicencio  MRN: [id number]     : 1950    Date of Service: 2019    Discharge Recommendations:  Further therapy recommended at discharge. Assessment   Performance deficits / Impairments: Decreased functional mobility ; Decreased ADL status; Decreased endurance;Decreased balance;Decreased high-level IADLs  Prognosis: Good  Decision Making: Medium Complexity  Patient Education: OT Services/OT POC, Safety Awareness/Fall Prevention, Safety with Transfers/RW Mngt, ADL Techniques, Home Safety, good return  REQUIRES OT FOLLOW UP: Yes  Activity Tolerance  Activity Tolerance: Patient Tolerated treatment well  Safety Devices  Safety Devices in place: Yes  Type of devices: Call light within reach;Nurse notified; Left in bed  Restraints  Initially in place: No           Patient Diagnosis(es): The encounter diagnosis was Cerebrovascular accident (CVA), unspecified mechanism ([facility] [address] has a past medical history of Hypertension. has a past surgical history that includes knee surgery (Bilateral); hip surgery (Right); Carpal tunnel release (Bilateral); and Cataract removal (Bilateral). Restrictions  Restrictions/Precautions  Restrictions/Precautions: Up as Tolerated  Required Braces or Orthoses?: No  Position Activity Restriction  Other position/activity restrictions: up with assistance    Subjective   General  Chart Reviewed: No  Patient assessed for rehabilitation services?: Yes  Family / Caregiver Present: No  General Comment  Comments: RN ok'd for therapy this PM. Pt agreeable to participate in session and pleasant/cooperative throughout.   Pain Assessment  Pain Assessment: 0-10  Pain Level: 0  Patient's Stated Pain Goal: No pain     Social/Functional History  Social/Functional History  Lives With: Son(adult son)  Type of Home: House  Home Layout: One level, Laundry in basement(son does all laundry tasks, pt does not access the basement level)  Home Access: Stairs to enter with rails  Entrance Stairs - Number of Steps: 6 steps  Entrance Stairs - Rails: Both  Bathroom Shower/Tub: Walk-in shower  Bathroom Toilet: Standard  Bathroom Equipment: Built-in shower seat  Bathroom Accessibility: Accessible  Home Equipment: Rolling walker, Cane  Receives Help From: Family(Pt reports son is physically able to assist if needed)  ADL Assistance: Independent  Homemaking Assistance: Independent  Homemaking Responsibilities: Yes(pt reports meal prep responsibility however son performs most IADLs and is able to complete all if needed)  Ambulation Assistance: Independent(pt reports use of AD only following prior R hip sx, pt states no recent use of AD required)  Transfer Assistance: Independent  Active : Yes  Mode of Transportation: [facility]  Occupation: Retired  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[employer]: Enjoys spending time with her grandchildren       Objective   Vision: Impaired  Vision Exceptions: Wears glasses for reading  Hearing: Within functional limits    Orientation  Overall Orientation Status: Within Functional Limits    Balance  Sitting Balance: Stand by assistance(seated EOB, seated on toilet)  Standing Balance: Contact guard assistance  Time: ~6 minutes total  Activity: functional mobility to/from bathroom, toilet transfer/toilet hygiene, standing sink side for hand hygiene  Comment: Initially attempted with no AD however pt unsteady and reaching for walls/furniture, pt provided RW and [facility]     Functional Mobility  Functional - Mobility Device: Rolling Walker  Activity: To/from bathroom  Assist Level: Contact guard assistance    Toilet Transfers  Toilet - Technique: Ambulating(with use of RW)  Equipment Used: Grab bars  Toilet Transfer: Stand by assistance     ADL  Feeding: Independent  Grooming: Stand by assistance  UE Bathing: Stand by assistance  LE Bathing: Contact guard assistance  UE Dressing: Stand by assistance  LE Dressing: Contact guard assistance(seated EOB to doff/don sock)  Toileting: Contact guard assistance(for LB clothing mngt, pericare/bottom hygiene, and toilet transfer)     Tone RUE  RUE Tone: Normotonic  Tone LUE  LUE Tone: Normotonic  Coordination  Movements Are Fluid And Coordinated: Yes        Bed mobility  Supine to Sit: Supervision  Sit to Supine: Supervision  Scooting: Supervision     Transfers  Stand Step Transfers: Contact guard assistance  Sit to stand: Stand by assistance  Stand to sit: Stand by assistance  Transfer Comments: use of RW throughout; pt educated in proper hand placement/safety techniques with use of RW and demo good return of these techniques throughout        Cognition  Overall Cognitive Status: Pt with noted word finding difficulty throughout session, pt with most difficulty with numbers.            Sensation  Overall Sensation Status: WFL        LUE AROM (degrees)  LUE AROM : WFL  RUE AROM (degrees)  RUE AROM : WFL  LUE Strength  Gross LUE Strength: WFL(grossly 4/5)  RUE Strength  Gross RUE Strength: WFL(grossly 4/5)           Plan   Plan  Times per week: 3-5x/wk  Current Treatment Recommendations: Balance Training, Endurance Training, Functional Mobility Training, Patient/Caregiver Education & Training, Safety Education & Training, Self-Care / ADL, Home Management Training, Equipment Evaluation, Education, & procurement      AM-PAC Score  [place] AM-PAC \"6-Clicks\" ADL Inpatient  Score: 17/24    Goals  Short term goals  Time Frame for Short term goals: Pt will, by discharge:  Short term goal 1: perform functional transfers/functional mobility with mod IND using least restrictive AD  Short term goal 2: perform ADL tasks with setup and use of AE/DME PRN  Short term goal 3: independently demo safety awareness during ADLs and functional transfers/functional mobility  Short term goal 4: demo 12+ minutes standing tolerance with use of RW for increased participation in ADLs       Therapy Time   Individual Concurrent Group Co-treatment   Time In 1333         Time Out 1352         Minutes 19         Timed Code Treatment Minutes: HUSEYIN Cui/L

## 2019-05-16 NOTE — PROGRESS NOTES
Speech Language Pathology  Facility/Department: [facility] NEURO  Initial Speech/Language/Cognitive Assessment    NAME: Jud Pedraza  : 1950   MRN: [id number]  ADMISSION DATE: 5/15/2019  ADMITTING DIAGNOSIS: has Cerebrovascular accident (CVA) ([address] Expressive aphasia; and TIA (transient ischemic attack) on their problem list.    Date of Eval: 2019   Evaluating Therapist: ZACH Torres    Primary Complaint: The patient is a 80 y.o. Non-/non  female who presents with Neurologic Problem   and she is admitted to the hospital for the management of  stroke work -up. Patient is a transfer from [place] [id number]. Her Caralyn Bach is 4:30 AM when she woke up to go to the bathroom and was her usual self and went back to bed however when she woke  up around 8 AM in morning to go to the bathroom and after coming she noticed she was feelingherself and could not do what she wanted to do. She  Picked up the phone to call EMS twice and had difficulty dialing the number. At [place] her NIH was 0 her CT head  two areas of low attenuation in right frontal lobe and right parietal lobe suggesting more remote infarct  She was loaded with aspirin 325 patient was out of the TPA window and had low nihss therefore no TPA was  Given and transferred to [facility] for further management.  [address] [place] [address] ED she was evaluated by the stroke team and her NIH was 1 for mild aphasia. She denied diplopia slurring speech and gait problem, headache, nausea vomiting, numbness tingling or any other neurological symptoms. .  Decision was made to admit her to general neurology for stroke workup. Pain:  Pain Assessment  Pain Assessment: 0-10  Pain Level: 0  Patient's Stated Pain Goal: No pain    Assessment:  Pt. Presents with mild-moderate cognitive deficits characterized by difficulty with delayed recall of 3 units, problem solving, deductive reasoning and word generation.   Word finding difficulties noted throughout evaluation. No dysarthria noted, no oral motor deficits. ST to follow up to address noted deficits. Education provided. Recommendations:  Requires SLP Intervention: Yes  Duration/Frequency of Treatment: 3-5 x week   D/C Recommendations: Outpatient       Plan:   Goals:  Short-term Goals  Goal 1: Pt. will recall 3-5 units with distractions with 90% accuracy. Goal 2: Pt. will complete problem solving and deductive reasoning tasks with 90% accuracy. Goal 3: Pt. will complete high level word finding tasks with 90% accuracy. Goal 4: Pt. will utilize memory compensatory strategies to aid in recall. Patient/family involved in developing goals and treatment plan: yes    Subjective:  General  Family / Caregiver Present: No  Social/Functional History  Lives With: Son  Active : Yes  Occupation: Retired  Vision  Vision: Within Functional Limits  Hearing  Hearing: Within functional limits           Objective:     Oral/Motor  Oral Motor: Within functional limits      Expression  Primary Mode of Expression: Verbal    Motor Speech  Motor Speech: Within Functional Limits         Cognition:      Orientation  Overall Orientation Status: Within Functional Limits(Not oriented to age or president. Oriented to name, , year, mnth and place )  Attention  Attention: Within Functional Limits  Memory  Memory: Exceptions to [facility]  Short-term Memory: Mild(0/3 increased to 3/3 with mod verbal cues, 2/3 )  Problem Solving  Problem Solving: Exceptions to [facility]  Verbal Reasoning Skills: Mild-Moderate (Antonyms: 1/3, Homorgraphs: 3/4, Inductive Reasoning: 3/4, Deductive Reasoning: 3/5)  Abstract Reasoning  Abstract Reasoning: Exceptions to [facility]  Divergent Thinking: Mild(+ 6 noted during task. Word finding difficulty noted throughout evaluation )  Safety/Judgement  Safety/Judgement: Exceptions to [facility]  Insight: Mild  (2/3)  Word Associations:   Within Functional Limits  Verbal Sequencing:  Within Functional Limits    Prognosis:  Speech Therapy Prognosis  Prognosis: Fair  Individuals consulted  Consulted and agree with results and recommendations: Patient    Education:  Patient Education: yes  Patient Education Response: Verbalizes understanding             Therapy Time:   Individual Concurrent Group Co-treatment   Time In 1         Time Out 1015         Minutes 10                 [address], M.A.  CCC-SLP  5/16/2019 10:45 AM

## 2019-05-16 NOTE — PROGRESS NOTES
NEUROLOGY INPATIENT PROGRESS NOTE    5/16/2019         Subjective: Gissel Perry is a  80 y.o. female admitted on5/15/2019 with Cerebrovascular accident (CVA), unspecified mechanism ([address] [I63.9]  TIA (transient ischemic attack) [G45.9]      Briefly, this is a  80 y.o. female admitted on 5/15/2019 transfer from [facility]. Her [facility] is 4:30 AM when she woke up to go to the bathroom and was her usual self and went back to bed however when she woke  up around 8 AM in morning to go to the bathroom and after coming she noticed she was feelingherself and could not do what she wanted to do. She  Picked up the phone to call EMS twice and had difficulty dialing the number. At [place] her NIH was 0 her CT head  two areas of low attenuation in right frontal lobe and right parietal lobe suggesting more remote infarct  She was loaded with aspirin 325 patient was out of the TPA window and had low nihss therefore no TPA was  Given and transferred to [facility] for further management.  [address] [place] [address] ED she was evaluated by the stroke team and her NIH was 1 for mild aphasia. She denied diplopia slurring speech and gait problem, headache, nausea vomiting, numbness tingling or any other neurological symptoms. Decision was made to admit her to general neurology for stroke workup.   Of note she is a past medical history of high blood pressure and is on Lopressor 25 mg daily and lisinopril 20 mg daily          Patient was evaluated at bedside, a alert and oriented ×3  Her vitals are  Vitals:    05/16/19 0008 05/16/19 0244 05/16/19 0412 05/16/19 0700   BP: 114/77 135/89 (!) 145/79 121/67   Pulse: 71 77 71 71   Resp: 18  14 16   Temp: 98.1 °F (36.7 °C) 98.2 °F (36.8 °C) 98.2 °F (36.8 °C) 97.7 °F (36.5 °C)   TempSrc: Oral Oral Oral Oral   SpO2: 94%      Weight:  195 lb 1.7 oz (88.5 kg)     Height:             Her MRI showed :          acute subacute stroke involving the left posterior temporal lobe no finding of hemorrhagic transformation. Mild chronic small ischemic disease with evidence of multiple foci of chronic encephalomalacia right MCA distribution, multiple chronic lacunar infarct involving both cerebral hemispheres. Review of her labs today a.m. shows no electrolyte abnormality has no WBC count rise-6.2 her H&H is stable-12.5/39. 2. Her UA and culture are negative for any infection. Her lipid profile shows HDL 52 LDL 95 on Lipitor 80  Hb1ac pending  EEG. Pending    No current facility-administered medications on file prior to encounter. Current Outpatient Medications on File Prior to Encounter   Medication Sig Dispense Refill    metoprolol tartrate (LOPRESSOR) 25 MG tablet Take 25 mg by mouth daily      alendronate (FOSAMAX) 70 MG tablet Take 70 mg by mouth every 7 days      lisinopril (PRINIVIL;ZESTRIL) 20 MG tablet Take 20 mg by mouth daily      aspirin 81 MG tablet Take 81 mg by mouth daily      vitamin D (CHOLECALCIFEROL) 1000 UNIT TABS tablet Take 1,000 Units by mouth daily      Omega-3 Fatty Acids (OMEGA-3 FISH OIL) 1000 MG CAPS Take 1,040 mg by mouth daily         Allergies: Nimesh Allen is allergic to no known allergies.     Past Medical History:   Diagnosis Date    Hypertension        Past Surgical History:   Procedure Laterality Date    CARPAL TUNNEL RELEASE Bilateral     CATARACT REMOVAL Bilateral     HIP SURGERY Right     KNEE SURGERY Bilateral            Medications:     sodium chloride flush  10 mL Intravenous 2 times per day    lisinopril  20 mg Oral Daily    metoprolol tartrate  25 mg Oral Daily    vitamin D  1,000 Units Oral Daily    sodium chloride flush  10 mL Intravenous 2 times per day    enoxaparin  40 mg Subcutaneous Daily    aspirin  325 mg Oral Daily    atorvastatin  80 mg Oral Nightly    omega-3 acid ethyl esters  1 g Oral Daily     PRN Meds include: sodium chloride flush, acetaminophen, sodium chloride flush, magnesium hydroxide, ondansetron, hydrALAZINE, labetalol    Objective:   /67   Pulse 71   Temp 97.7 °F (36.5 °C) (Oral)   Resp 16   Ht 5' 6\" (1.676 m)   Wt 195 lb 1.7 oz (88.5 kg)   SpO2 94%   BMI 31.49 kg/m²     Blood pressure range: Systolic (76KMC), AMJ:724 , Min:114 , DSR:668   ; Diastolic (07IEX), AHY:31, Min:67, Max:107      Review of Systems        NEUROLOGICEXAMINATION  GENERAL  Appears comfortable and in no distress   HEENT  NC/ AT   cardiovascular  S1 and S2 heard; palpation of pulses: radial pulse    NECK  Supple and no bruits heard   MENTAL STATUS: Alert, oriented, intact memory, no confusion, normal speech, normal language, no hallucination or delusion   CRANIAL NERVES: II     -      Visual fields intact to confrontation  III,IV,VI -  PERR, EOMs full, no ptosis  V     -     Normal facial sensation   VII    -     Normal facial symmetry  VIII-     Intact hearing   IX,X -     Symmetrical palate  XI    -     Symmetrical shoulder shrug  XII   -     Midline tongue, no atrophy    MOTOR FUNCTION:  significant for good strength of grade 5/5 in bilateral proximal and distal muscle groups of both upper and lower extremities with normal bulk, normaltone and no involuntary movements, no tremor   SENSORY FUNCTION:  Normal touch, normal pin, normal vibration, normal proprioception   CEREBELLAR FUNCTION:  Intact fine motor control over upper limbs   REFLEX FUNCTION:  Symmetric, no Babinski sign   STATION and GAIT  Not tested     NIH Stroke Scale Total (if not done complete detailed one below):    1a.  Level of consciousness:  0 - alert; keenly responsive  1b. Level of consciousness questions:  0 - answers both questions correctly  1c. Level of consciousness questions:  0 - performs both tasks correctly  2. Best Gaze:  0 - normal  3. Visual:  0 - no visual loss  4. Facial Palsy:  0 - normal symmetric movement  5a. Motor left arm:  0 - no drift, limb holds 90 (or 45) degrees for full 10 seconds  5b.   Motor right arm:  0 - no drift, limb holds

## 2019-05-16 NOTE — PLAN OF CARE
Problem: Infection:  Goal: Will remain free from infection  Description  Will remain free from infection  Outcome: Ongoing     Problem: Safety:  Goal: Free from accidental physical injury  Description  Free from accidental physical injury  Outcome: Ongoing  Goal: Free from intentional harm  Description  Free from intentional harm  Outcome: Ongoing     Problem: Daily Care:  Goal: Daily care needs are met  Description  Daily care needs are met  Outcome: Ongoing     Problem: Pain:  Goal: Patient's pain/discomfort is manageable  Description  Patient's pain/discomfort is manageable  Outcome: Ongoing     Problem: Skin Integrity:  Goal: Skin integrity will stabilize  Description  Skin integrity will stabilize  Outcome: Ongoing     Problem: Discharge Planning:  Goal: Patients continuum of care needs are met  Description  Patients continuum of care needs are met  Outcome: Ongoing     Problem: HEMODYNAMIC STATUS  Goal: Patient has stable vital signs and fluid balance  Outcome: Ongoing     Problem: ACTIVITY INTOLERANCE/IMPAIRED MOBILITY  Goal: Mobility/activity is maintained at optimum level for patient  Outcome: Ongoing     Problem: COMMUNICATION IMPAIRMENT  Goal: Ability to express needs and understand communication  Outcome: Ongoing

## 2019-05-17 ENCOUNTER — APPOINTMENT (OUTPATIENT)
Dept: CARDIAC CATH/INVASIVE PROCEDURES | Age: 69
DRG: 042 | End: 2019-05-17
Payer: MEDICARE

## 2019-05-17 LAB
LV EF: 45 %
LV EF: 45 %
LVEF MODALITY: NORMAL
LVEF MODALITY: NORMAL

## 2019-05-17 PROCEDURE — 92507 TX SP LANG VOICE COMM INDIV: CPT

## 2019-05-17 PROCEDURE — 2580000003 HC RX 258: Performed by: PSYCHIATRY & NEUROLOGY

## 2019-05-17 PROCEDURE — 93312 ECHO TRANSESOPHAGEAL: CPT

## 2019-05-17 PROCEDURE — 99221 1ST HOSP IP/OBS SF/LOW 40: CPT | Performed by: PHYSICAL MEDICINE & REHABILITATION

## 2019-05-17 PROCEDURE — 2709999900 HC NON-CHARGEABLE SUPPLY

## 2019-05-17 PROCEDURE — 6360000002 HC RX W HCPCS

## 2019-05-17 PROCEDURE — 0JH632Z INSERTION OF MONITORING DEVICE INTO CHEST SUBCUTANEOUS TISSUE AND FASCIA, PERCUTANEOUS APPROACH: ICD-10-PCS | Performed by: INTERNAL MEDICINE

## 2019-05-17 PROCEDURE — 2060000000 HC ICU INTERMEDIATE R&B

## 2019-05-17 PROCEDURE — 97127 HC SP THER IVNTJ W/FOCUS COG FUNCJ: CPT

## 2019-05-17 PROCEDURE — 99232 SBSQ HOSP IP/OBS MODERATE 35: CPT | Performed by: PSYCHIATRY & NEUROLOGY

## 2019-05-17 PROCEDURE — 6360000002 HC RX W HCPCS: Performed by: STUDENT IN AN ORGANIZED HEALTH CARE EDUCATION/TRAINING PROGRAM

## 2019-05-17 PROCEDURE — 2580000003 HC RX 258: Performed by: STUDENT IN AN ORGANIZED HEALTH CARE EDUCATION/TRAINING PROGRAM

## 2019-05-17 PROCEDURE — C1764 EVENT RECORDER, CARDIAC: HCPCS

## 2019-05-17 PROCEDURE — B24BZZ4 ULTRASONOGRAPHY OF HEART WITH AORTA, TRANSESOPHAGEAL: ICD-10-PCS | Performed by: INTERNAL MEDICINE

## 2019-05-17 PROCEDURE — 2500000003 HC RX 250 WO HCPCS

## 2019-05-17 PROCEDURE — 6370000000 HC RX 637 (ALT 250 FOR IP): Performed by: STUDENT IN AN ORGANIZED HEALTH CARE EDUCATION/TRAINING PROGRAM

## 2019-05-17 PROCEDURE — 93325 DOPPLER ECHO COLOR FLOW MAPG: CPT

## 2019-05-17 PROCEDURE — 33285 INSJ SUBQ CAR RHYTHM MNTR: CPT | Performed by: INTERNAL MEDICINE

## 2019-05-17 RX ORDER — SODIUM CHLORIDE 0.9 % (FLUSH) 0.9 %
10 SYRINGE (ML) INJECTION EVERY 12 HOURS SCHEDULED
Status: DISCONTINUED | OUTPATIENT
Start: 2019-05-17 | End: 2019-05-19 | Stop reason: HOSPADM

## 2019-05-17 RX ORDER — SODIUM CHLORIDE 0.9 % (FLUSH) 0.9 %
10 SYRINGE (ML) INJECTION PRN
Status: DISCONTINUED | OUTPATIENT
Start: 2019-05-17 | End: 2019-05-19 | Stop reason: HOSPADM

## 2019-05-17 RX ADMIN — VITAMIN D, TAB 1000IU (100/BT) 1000 UNITS: 25 TAB at 18:34

## 2019-05-17 RX ADMIN — VITAMIN D, TAB 1000IU (100/BT) 1000 UNITS: 25 TAB at 18:38

## 2019-05-17 RX ADMIN — METOPROLOL TARTRATE 25 MG: 25 TABLET ORAL at 18:35

## 2019-05-17 RX ADMIN — Medication 10 ML: at 10:16

## 2019-05-17 RX ADMIN — Medication 10 ML: at 21:24

## 2019-05-17 RX ADMIN — Medication 10 ML: at 10:15

## 2019-05-17 RX ADMIN — ASPIRIN 325 MG: 325 TABLET, COATED ORAL at 10:14

## 2019-05-17 RX ADMIN — OMEGA-3-ACID ETHYL ESTERS 1 G: 1 CAPSULE, LIQUID FILLED ORAL at 18:39

## 2019-05-17 RX ADMIN — LISINOPRIL 20 MG: 20 TABLET ORAL at 18:38

## 2019-05-17 RX ADMIN — ENOXAPARIN SODIUM 40 MG: 40 INJECTION, SOLUTION INTRAVENOUS; SUBCUTANEOUS at 10:15

## 2019-05-17 RX ADMIN — ATORVASTATIN CALCIUM 80 MG: 80 TABLET, FILM COATED ORAL at 21:16

## 2019-05-17 ASSESSMENT — PAIN SCALES - GENERAL: PAINLEVEL_OUTOF10: 0

## 2019-05-17 NOTE — PROGRESS NOTES
Speech Language Pathology  Speech Language Pathology  [address] [facility]    Cognitive/Speech-Language Treatment Note    Date: 5/17/2019  Patients Name: Eligio Mandel  MRN: [id number]  Patient Active Problem List   Diagnosis Code    Cerebrovascular accident (CVA) ([facility] [address] I63.9    Expressive aphasia R47.01    TIA (transient ischemic attack) G45.9       Pain: 0/10    Cognitive Treatment    Treatment time: 4758-0924      Subjective: [x] Alert [x] Cooperative     [] Confused     [] Agitated    [] Lethargic      Objective/Assessment:    Recall: Delayed recall of 3 units related:  3/3 independently   Delayed recall of 3 units unrelated: 3/3 independently    Organization: Word generation, concrete: 90% increased to 100% with min verbal cues      Problem Solving/Reasoning: Word Deductions: 86% increased to 100% with min-mod verbal cues       Opposites:   94% increased to 100% with mod verbal cues         Stating situation problems: 95% increased to 100 with min verbal cues      Other: Word retrieval:  Naming words by letter:  88% increased to 94% with min verbal cues    Rhyming words:  92% increased to 100% with min verbal cues     Pt. With increased skills on this day. Continues to present with min difficulty with word retrieval in conversation. Plan:  [x] Continue  services    [] Discharge from :      Discharge recommendations: [] Inpatient Rehab   [] [place]   [] Outpatient Therapy  [] Follow up at trauma clinic   [x] Other: Further therapy recommended at discharge. The patient should be able to tolerate at least three hours of therapy per day over 5 days or 15 hours over 7 days. Treatment completed by: Bard Mer M.A.  CCC-SLP

## 2019-05-17 NOTE — PROGRESS NOTES
Occupational Therapy Not Seen Note    DATE: 2019  Name: Jerome Driscoll  : 1950  MRN: [id number]    Patient not available for Occupational Therapy due to: Other: Pt off floor for HONORIO.  Ck     Electronically signed by Iris Jacobs S/OT on 2019 at 2:28 PM

## 2019-05-17 NOTE — PROCEDURES
[facility]                Procedure Note      Jerome Driscoll (33 y.o., female)  1950 5/17/2019      Procedure: Insertable loop recorder implantation ([employer])    Operators:    Primary: Lisette Galdamez (Attending Physician)  Assistant: Xavier Shepherd (Cardiovascular Fellow)    Indication: Cryptogenic stroke    Procedure  The patient was brought to the Cardiac Cath lab after informed consent was obtained and risks and the benefits were explained. Patient was given local anesthesia over the fourth intercostal space on anterior chest which was prepped and draped in the usual manner. The pocket in a dingle layer and Dermabond applied. No complications encountered. Patient left the lab in a very stable condition. Technical Data:      Model # Serial #   ILR Reveal [id number]       Impression:  · Successful implantation of a ILR for arrhythmia detection. · Sedation monitoring      Plan:  Follow up with her physicians.       Electronically signed by Dilan Diaz MD on 5/17/2019 at 4:17 PM  Cardiology Fellow

## 2019-05-17 NOTE — CARE COORDINATION
Care Transition  Received call from daughter-in-law and she wants her mother in law close to home. She will be in today to discuss choices and will let writer know once decided. Met with patient's daughter in law and she chose [facility] for home care. Referral sent.

## 2019-05-17 NOTE — PROGRESS NOTES
NEUROLOGY INPATIENT PROGRESS NOTE    5/17/2019         Subjective: Modesto Alas is a  80 y.o. female admitted on5/15/2019 with Cerebrovascular accident (CVA), unspecified mechanism ([facility] [address] [I63.9]  TIA (transient ischemic attack) [G45.9]      Briefly, this is a  80 y.o. female admitted on 5/15/2019 transfer from [facility]. Her [facility] is 4:30 AM when she woke up to go to the bathroom and was her usual self and went back to bed however when she woke  up around 8 AM in morning to go to the bathroom and after coming she noticed she was feelingherself and could not do what she wanted to do. She  Picked up the phone to call EMS twice and had difficulty dialing the number. At [place] her NIH was 0 her CT head  two areas of low attenuation in right frontal lobe and right parietal lobe suggesting more remote infarct. She was loaded with aspirin 325 patient was out of the TPA window and had low nihss therefore no TPA was  Given and transferred to [address] [place] [address] for further management.   [address] [place] [address] ED she was evaluated by the stroke team and her NIH was 1 for mild aphasia. She denied diplopia slurring speech and gait problem, headache, nausea vomiting, numbness tingling or any other neurological symptoms. Decision was made to admit her to general neurology for stroke workup. Of note she is a past medical history of high blood pressure and is on Lopressor 25 mg daily and lisinopril 20 mg daily. EEG shows mild left hemispheric temporal slowing consistent with a focal structural process.  No epileptiform process seen.          Patient was evaluated at bedside, a alert and oriented ×3  Her vitals are  Vitals:    05/17/19 0107 05/17/19 0430 05/17/19 0500 05/17/19 0700   BP: (!) 101/56 (!) 100/50  96/71   Pulse: 84 87  77   Resp: 13   13   Temp:    97.3 °F (36.3 °C)   TempSrc:    Oral   SpO2: 96% 93%     Weight:   197 lb 1.5 oz (89.4 kg)    Height:             Her MRI showed :          Acute subacute stroke involving the left posterior temporal lobe no finding of hemorrhagic transformation. Mild chronic small ischemic disease with evidence of multiple foci of chronic encephalomalacia right MCA distribution, multiple chronic lacunar infarct involving both cerebral hemispheres. Review of her labs today a.m. shows no electrolyte abnormality has no WBC count rise-6.2 her H&H is stable-12.5/39. 2. Her UA and culture are negative for any infection. Her lipid profile shows HDL 52 LDL 95 on Lipitor 80      No current facility-administered medications on file prior to encounter. Current Outpatient Medications on File Prior to Encounter   Medication Sig Dispense Refill    metoprolol tartrate (LOPRESSOR) 25 MG tablet Take 25 mg by mouth daily      alendronate (FOSAMAX) 70 MG tablet Take 70 mg by mouth every 7 days      lisinopril (PRINIVIL;ZESTRIL) 20 MG tablet Take 20 mg by mouth daily      aspirin 81 MG tablet Take 81 mg by mouth daily      vitamin D (CHOLECALCIFEROL) 1000 UNIT TABS tablet Take 1,000 Units by mouth daily      Omega-3 Fatty Acids (OMEGA-3 FISH OIL) 1000 MG CAPS Take 1,040 mg by mouth daily         Allergies: Bharath Steinberg is allergic to no known allergies.     Past Medical History:   Diagnosis Date    Hypertension        Past Surgical History:   Procedure Laterality Date    CARPAL TUNNEL RELEASE Bilateral     CATARACT REMOVAL Bilateral     HIP SURGERY Right     KNEE SURGERY Bilateral            Medications:     sodium chloride flush  10 mL Intravenous 2 times per day    lisinopril  20 mg Oral Daily    metoprolol tartrate  25 mg Oral Daily    vitamin D  1,000 Units Oral Daily    sodium chloride flush  10 mL Intravenous 2 times per day    enoxaparin  40 mg Subcutaneous Daily    aspirin  325 mg Oral Daily    atorvastatin  80 mg Oral Nightly    omega-3 acid ethyl esters  1 g Oral Daily     PRN Meds include: sodium chloride flush, acetaminophen, sodium chloride flush, magnesium hydroxide, ondansetron, hydrALAZINE, labetalol    Objective:   BP 96/71   Pulse 77   Temp 97.3 °F (36.3 °C) (Oral)   Resp 13   Ht 5' 6\" (1.676 m)   Wt 197 lb 1.5 oz (89.4 kg)   SpO2 93% Comment: erick  BMI 31.81 kg/m²     Blood pressure range: Systolic (42HUC), GNP:615 , Min:96 , QSS:792   ; Diastolic (57MYG), LPD:50, Min:50, Max:71      Review of Systems    Physical Exam   Constitutional: She is oriented to person, place, and time. She appears well-developed and well-nourished. No distress. HENT:   Head: Normocephalic and atraumatic. Right Ear: External ear normal.   Left Ear: External ear normal.   Eyes: Pupils are equal, round, and reactive to light. Conjunctivae and EOM are normal. Right eye exhibits no discharge. Left eye exhibits no discharge. No scleral icterus. Neck: Normal range of motion. Pulmonary/Chest: Effort normal.   Musculoskeletal: Normal range of motion. She exhibits no edema, tenderness or deformity. Neurological: She is alert and oriented to person, place, and time. She has normal strength and normal reflexes. She displays normal reflexes. No cranial nerve deficit or sensory deficit. She exhibits normal muscle tone. She displays no seizure activity. Coordination normal. GCS eye subscore is 4. GCS verbal subscore is 5. GCS motor subscore is 6. Reflex Scores:       Tricep reflexes are 2+ on the right side and 2+ on the left side. Bicep reflexes are 2+ on the right side and 2+ on the left side. Brachioradialis reflexes are 2+ on the right side and 2+ on the left side. Patellar reflexes are 2+ on the right side and 2+ on the left side. Achilles reflexes are 2+ on the right side and 2+ on the left side. Skin: Skin is warm and dry. Capillary refill takes less than 2 seconds. No rash noted. She is not diaphoretic. No erythema. Psychiatric: She has a normal mood and affect.  Her behavior is normal.   Nursing note and vitals reviewed. NEUROLOGICEXAMINATION  GENERAL  Appears comfortable and in no distress   HEENT  NC/ AT   cardiovascular  S1 and S2 heard; palpation of pulses: radial pulse    NECK  Supple and no bruits heard   MENTAL STATUS: Alert, oriented, intact memory, no confusion, normal speech, normal language, no hallucination or delusion   CRANIAL NERVES: II     -      Visual fields intact to confrontation  III,IV,VI -  PERR, EOMs full, no ptosis  V     -     Normal facial sensation   VII    -     Normal facial symmetry  VIII-     Intact hearing   IX,X -     Symmetrical palate  XI    -     Symmetrical shoulder shrug  XII   -     Midline tongue, no atrophy    MOTOR FUNCTION:  significant for good strength of grade 5/5 in bilateral proximal and distal muscle groups of both upper and lower extremities with normal bulk, normaltone and no involuntary movements, no tremor   SENSORY FUNCTION:  Normal touch, normal pin, normal vibration, normal proprioception   CEREBELLAR FUNCTION:  Intact fine motor control over upper limbs   REFLEX FUNCTION:  Symmetric, no Babinski sign   STATION and GAIT  Not tested     NIH Stroke Scale Total (if not done complete detailed one below):    1a.  Level of consciousness:  0 - alert; keenly responsive  1b. Level of consciousness questions:  0 - answers both questions correctly  1c. Level of consciousness questions:  0 - performs both tasks correctly  2. Best Gaze:  0 - normal  3. Visual:  0 - no visual loss  4. Facial Palsy:  0 - normal symmetric movement  5a. Motor left arm:  0 - no drift, limb holds 90 (or 45) degrees for full 10 seconds  5b. Motor right arm:  0 - no drift, limb holds 90 (or 45) degrees for full 10 seconds  6a. Motor left le - no drift; leg holds 30 degree position for full 5 seconds  6b. Motor right le - no drift; leg holds 30 degree position for full 5 seconds  7. Limb Ataxia:  0 - absent  8. Sensory:  0 - normal; no sensory loss  9.     Best Language:  0 - no aphasia, normal  10. Dysarthria:  0 - normal  11. Extinction and Inattention:  0 - no abnormality  Total-0  Data:    Lab Results:   CBC:   Recent Labs     05/15/19  0843 05/16/19  0555   WBC 7.1 6.2   HGB 13.9 12.5    241     BMP:    Recent Labs     05/15/19  0843 05/16/19  0555    140   K 4.1 3.9    102   CO2 26 26   BUN 12 11   CREATININE 0.85 0.71   GLUCOSE 121* 113*         Lab Results   Component Value Date    CHOL 160 05/15/2019    LDLCHOLESTEROL 95 05/15/2019    HDL 52 05/15/2019    TRIG 64 05/15/2019    ALT 15 05/15/2019    AST 13 05/15/2019    INR 1.0 05/15/2019    LABA1C 6.2 (H) 05/15/2019       No results found for: PHENYTOIN, PHENYTOIN, PHENOBARB, VALPROATE, CBMZ          ASSESSMENT/PLAN  80year old female with past medical history of hypertension came as a transfer from [facility] due to stroke/TIA like symptoms MRI shows cortical stroke most like embolic in nature    - Continue aspirin 325 daily and Lipitor 80  - SBP up to <220 on Lopressor 25 and lisinopril 20  - ECHO - LVEF 45% w/hypokinesis of mid to basal inferolateral wall, no thrombus visualized  - HONORIO with possible loop recorder - cardiology consulted.    - Depending on echo results and would put patient on xaltero  2.5 mg and asa 81 (COMPASS TRIAL)  - Neurochecks per protocol  - PT/OT     Stanley Sanders MD, [place]ER[place]  Transitional/ Neurology Resident  5/17/2019 at 8:16 AM

## 2019-05-17 NOTE — PROCEDURES
[facility]  HONORIO procedure Note         Today's Date: 5/17/2019  Indication: CVA    Operators:  Primary: Nydia Mcqueen (Attending Physician)  Assistant: Bethany Hernandez (Cardiology Fellow)    Patient seen and examined. History and Physical reviewed. Labs reviewed. After informed consent was obtained with explanation of the risks and benefits, the patient was brought to Cath lab. All sedation was administered via the Cardiology department. The oropharynx was pre anesthetisized with cetacaine spray. A single intubation effort was required. HONORIO:    Structures:  LA: Normal in size. RODRÍGUEZ: No clot or thrombus identified. RA: Normal in size. RV: Normal in size and systolic function. LV: Normal in size with mildly reduced systolic function. Estimated LVEF: 45%. LV Tony: No LV apical thrombus identified. Aorta: Moderate atheromatous disease arch. Pericardium: No pericardial effusion. Septum: No intracardiac shunt via color Doppler. No intracardiac shunt via injection of agitated saline. Valves:  Mitral Valve: Structurally normal. Mild regurgitation is identified. Aortic Valve: The aortic valve is trileaflet and opens adequately. Mild regurgitiation is identified. Tricuspid valve: Structurally normal. No regurgitation is identified. Pulmonary valve: Normal. No significant regurgitation. Summary:   1. A HONORIO was performed without complications. 2. LVEF 45%. 3. No thrombus or valvular vegetation identified. 4. Mild MR, Mild AI. 5. No intracardiac shunt via color doppler or agitated saline injection. There were no complications encountered. The patient was discussed with the Attending.       Liset Peterson MD  Cardiology Fellow

## 2019-05-17 NOTE — PROCEDURES
[facility]                          ELECTROENCEPHALOGRAM REPORT    PATIENT NAME: Mario Caldreon                   :        1950  MED REC NO:   [id number]                             ROOM:       1215  ACCOUNT NO:   [de-identified]                           ADMIT DATE: 05/15/2019  PROVIDER:     Hardin Dancer    DATE OF EE2019    ATTENDING ON RECORD:  Dr. Ramona Oscar along with Dr. Cris Giraldo. REASON FOR STUDY:  This is a 62-year-old lady who presented with trouble  speaking with left cerebral infarction. MEDICATIONS:  Include Tylenol, Zestril, Lopressor, Zofran, Apresoline,  Lipitor. A 16-channel EEG with one EKG channel recording performed on a patient  described to be awake, drowsy, and asleep. The patient shows normal  waking rhythms. Background activity consists of well-regulated 10 Hz  activity in the 40 to 60 microvolt range, more prominent over the  posterior head area, showing good reactivity to eye opening and closing. Over the anterior head regions, there are 15 to 20 Hz activity in the 20  to 30 microvolt range. The record is prominent for a mild degree of  medium-amplitude 4 to 7 Hz activity seen over the left hemispheric  temporal head area. With drowsiness and sleep, there is further  intrusion of slower frequencies in a theta and lesser degree a delta  band. This record is not epileptiform. Hyperventilation is not  performed. Photic stimulation shows no change of the record. IMPRESSION:  This EEG shows the presence of mild left hemispheric  temporal slowing. This is concordant with a focal structural process. No epileptiform discharge is seen. Clinical correlation is warranted.         Hamilton Lazar    D: 2019 18:27:16       T: 2019 19:32:03     [email or web address]  Job#: [id number]     Doc#: [id number]    CC:

## 2019-05-17 NOTE — FLOWSHEET NOTE
DATE: 2019    NAME: Cinthya Ash  MRN: [id number]   : 1950    Patient not seen this date for Physical Therapy due to:  [] Blood transfusion in progress  [] Hemodialysis  []  Patient Declined  [] Spine Precautions   [] Strict Bedrest  [] Surgery/ Procedure  [x] Testing- pt leaving for HONORIO upon arrival       [] Other        [] PT being discontinued at this time. Patient independent. No further needs. [] PT being discontinued at this time as the patient has been transferred to palliative care. No further needs.     Enrique Jorgensen, PT

## 2019-05-17 NOTE — CONSULTS
Inpatient consult to Physical Medicine Rehab  Consult performed by: Sonja Franz MD  Consult ordered by: Rafael Adorno          Physical Medicine & Rehabilitation  Consult Note      Admitting Physician:   Alicia Winkler, *    Primary Care Provider:   Cortney Turner DO     Reason for Consult:  Acute Inpatient Rehabilitation    Chief Complaint: Trouble expressing herself    History of Present Illness:  Referring Provider is requesting an evaluation for appropriate placement upon discharge from acute care. History from chart review and patient. Lucy Lucas is a 80 y.o. female admitted to [place] [address] on 5/15/2019. The patient was brought to the ED in [place] after waking up and having difficulty sorting her thoughts and performing daily tasks. Head CT revealed an old right frontal right parietal infarct. She was transferred to [facility] for further workup. CTA head and neck with no large vessel occlusion. MRI of the head with acute left temporoparietal infarct and old right frontal, right occipital, and left frontal infarctions. Per chart review, patient had cardiac 2D echo normal LVF, ejection fraction-45% with hypokinesis mid to basal inferolateral wall. Grade 1 diastolic dysfunction. Mild MR and AI. EEG revealed mild left hemispheric temporal slowing.     Review of Systems:  Constitutional: negative for anorexia, chills, fatigue, fevers, sweats and weight loss  Eyes: negative for redness and visual disturbance  Ears, nose, mouth, throat, and face: negative for earaches, sore throat and tinnitus  Respiratory: negative for cough and shortness of breath  Cardiovascular: negative for chest pain, dyspnea and palpitations  Gastrointestinal: negative for abdominal pain, change in bowel habits, constipation, nausea and vomiting  Genitourinary:negative for dysuria, frequency, hesitancy and urinary incontinence  Integument/breast: negative for pruritus and rash  Musculoskeletal:negative for muscle weakness and stiff joints  Neurological: negative for dizziness, headaches and weakness  Behavioral/Psych: negative for decreased appetite, depression and fatigue     Premorbid function:  Independent with all prior to admission. Did not use an assistive device. Current function:    PT:  Restrictions/Precautions: Up as Tolerated  Other position/activity restrictions: up with assistance   Transfers  Sit to Stand: Supervision  Stand to sit: Stand by assistance  Bed to Chair: Contact guard assistance(Activity did not occur. )  Stand Pivot Transfers: Stand by assistance  Comment: Pt requires VC to complete sit to stand transfers properly with proper use of AD and proper use of UE's. Ambulation 1  Surface: level tile  Device: No Device  Assistance: Minimal assistance  Quality of Gait: Pt very unsteady with decreased cordell and decreased step length bilaterally. Distance: 10ft  Comments: Pt very unsteady and reaching out for furniture to help with balance. Transfers  Sit to Stand: Supervision  Stand to sit: Stand by assistance  Bed to Chair: Contact guard assistance(Activity did not occur. )  Stand Pivot Transfers: Stand by assistance  Comment: Pt requires VC to complete sit to stand transfers properly with proper use of AD and proper use of UE's. Ambulation  Ambulation?: Yes  More Ambulation?: Yes  Ambulation 1  Surface: level tile  Device: No Device  Assistance: Minimal assistance  Quality of Gait: Pt very unsteady with decreased cordell and decreased step length bilaterally. Distance: 10ft  Comments: Pt very unsteady and reaching out for furniture to help with balance. Surface: level tile  Ambulation 1  Surface: level tile  Device: No Device  Assistance: Minimal assistance  Quality of Gait: Pt very unsteady with decreased cordell and decreased step length bilaterally.    Distance: 10ft  Comments: Pt very unsteady and reaching out for furniture to help with balance. OT:   ADL  Feeding: Independent  Grooming: Stand by assistance  UE Bathing: Stand by assistance  LE Bathing: Contact guard assistance  UE Dressing: Stand by assistance  LE Dressing: Contact guard assistance(seated EOB to doff/don sock)  Toileting: Contact guard assistance(for LB clothing mngt, pericare/bottom hygiene, and toilet transfer)         Balance  Sitting Balance: Stand by assistance(seated EOB, seated on toilet)  Standing Balance: Contact guard assistance   Standing Balance  Time: ~6 minutes total  Activity: functional mobility to/from bathroom, toilet transfer/toilet hygiene, standing sink side for hand hygiene  Comment: Initially attempted with no AD however pt unsteady and reaching for walls/furniture, pt provided RW and progressed to CGA.   Functional Mobility  Functional - Mobility Device: Rolling Walker  Activity: To/from bathroom  Assist Level: Contact guard assistance     Bed mobility  Supine to Sit: Supervision  Sit to Supine: Supervision  Scooting: Supervision  Transfers  Stand Step Transfers: Contact guard assistance  Sit to stand: Stand by assistance  Stand to sit: Stand by assistance  Transfer Comments: use of RW throughout; pt educated in proper hand placement/safety techniques with use of RW and demo good return of these techniques throughout   Toilet Transfers  Toilet - Technique: Ambulating(with use of RW)  Equipment Used: Grab bars  Toilet Transfer: Stand by assistance     ST:  Recall: Delayed recall of 3 units related:  3/3 independently              Delayed recall of 3 units unrelated: 3/3 independently     Organization: Word generation, concrete: 90% increased to 100% with min verbal cues       Problem Solving/Reasoning: Word Deductions: 86% increased to 100% with min-mod verbal cues                                                   Opposites:   94% increased to 100% with mod verbal cues                                                   Stating situation problems: 95% increased to 100 with min verbal cues       Other: Word retrieval:  Naming words by letter:  88% increased to 94% with min verbal cues               Rhyming words:  92% increased to 100% with min verbal cues      Pt. With increased skills on this day. Continues to present with min difficulty with word retrieval in conversation. Past Medical History:        Diagnosis Date    Hypertension        Past Surgical History:        Procedure Laterality Date    CARPAL TUNNEL RELEASE Bilateral     CATARACT REMOVAL Bilateral     HIP SURGERY Right     KNEE SURGERY Bilateral        Allergies:     Allergies   Allergen Reactions    No Known Allergies         Current Medications:   Current Facility-Administered Medications: sodium chloride flush 0.9 % injection 10 mL, 10 mL, Intravenous, 2 times per day  sodium chloride flush 0.9 % injection 10 mL, 10 mL, Intravenous, PRN  acetaminophen (TYLENOL) tablet 650 mg, 650 mg, Oral, Q4H PRN  lisinopril (PRINIVIL;ZESTRIL) tablet 20 mg, 20 mg, Oral, Daily  metoprolol tartrate (LOPRESSOR) tablet 25 mg, 25 mg, Oral, Daily  vitamin D (CHOLECALCIFEROL) tablet 1,000 Units, 1,000 Units, Oral, Daily  sodium chloride flush 0.9 % injection 10 mL, 10 mL, Intravenous, 2 times per day  sodium chloride flush 0.9 % injection 10 mL, 10 mL, Intravenous, PRN  magnesium hydroxide (MILK OF MAGNESIA) 400 MG/5ML suspension 30 mL, 30 mL, Oral, Daily PRN  ondansetron (ZOFRAN) injection 4 mg, 4 mg, Intravenous, Q6H PRN  enoxaparin (LOVENOX) injection 40 mg, 40 mg, Subcutaneous, Daily  aspirin tablet 325 mg, 325 mg, Oral, Daily  hydrALAZINE (APRESOLINE) injection 10 mg, 10 mg, Intravenous, Q6H PRN  labetalol (NORMODYNE;TRANDATE) injection 10 mg, 10 mg, Intravenous, Q4H PRN  atorvastatin (LIPITOR) tablet 80 mg, 80 mg, Oral, Nightly  omega-3 acid ethyl esters (LOVAZA) capsule 1 g, 1 g, Oral, Daily    Social History:  Lives with:   Adult son who has developmental delays but is independent needing no assistance and does lawn care and household maintenance per mother. They live in a house with 6 steps to enter and one floor. The patient states she does not go into the basement. Social History     Socioeconomic History    Marital status:      Spouse name: None    Number of children: None    Years of education: None    Highest education level: None   Occupational History    None   Social Needs    Financial resource strain: None    Food insecurity:     Worry: None     Inability: None    Transportation needs:     Medical: None     Non-medical: None   Tobacco Use    Smoking status: Never Smoker    Smokeless tobacco: Never Used   Substance and Sexual Activity    Alcohol use: None    Drug use: None    Sexual activity: None   Lifestyle    Physical activity:     Days per week: None     Minutes per session: None    Stress: None   Relationships    Social connections:     Talks on phone: None     Gets together: None     Attends Synagogue service: None     Active member of club or organization: None     Attends meetings of clubs or organizations: None     Relationship status: None    Intimate partner violence:     Fear of current or ex partner: None     Emotionally abused: None     Physically abused: None     Forced sexual activity: None   Other Topics Concern    None   Social History Narrative    None       Family History:   History reviewed. No pertinent family history. Radiology:    MRI brain 5/15/2019-     Impression   Acute subacute stroke identified involving left posterior temporal lobe.  No   findings of hemorrhagic transformation.       Mild chronic small ischemic disease with evidence of multiple foci of chronic   encephalomalacia right MCA distribution right cerebral hemisphere       Multiple chronic lacune infarcts identified involving both cerebral   hemispheres.            CTA head 5/15/2019-      CTA HEAD:       ANTERIOR CIRCULATION: The internal carotid arteries are normal in course and   caliber without focal stenosis. The anterior cerebral and middle cerebral   arteries demonstrate no focal stenosis.       POSTERIOR CIRCULATION: The posterior cerebral arteries demonstrate no focal   stenosis. The vertebral and basilar arteries appear unremarkable.       BRAIN: The noncontrast head CT from 9:34 a.m. is being re-reviewed. Hai Plump   are acute infarcts within the left parietal and left occipital lobes. Hai Plump   is also a probable acute infarct within the right occipital lobe.  Chronic   infarcts are seen in the right frontal and right parietal lobes.           Impression   Acute and chronic cerebral infarcts.       No flow limiting stenosis or large vessel occlusion detected within the head   or neck.       Findings were discussed with Michele Ni at 2:56 pm on 5/15/2019. Diagnostics:    CBC:   Recent Labs     05/15/19  0843 05/16/19  0555   WBC 7.1 6.2   RBC 4.32 3.95   HGB 13.9 12.5   HCT 43.1 39.2   MCV 99.8 99.2   RDW 12.4 12.5    241     BMP:   Recent Labs     05/15/19  0843 05/16/19  0555    140   K 4.1 3.9    102   CO2 26 26   BUN 12 11   CREATININE 0.85 0.71     BNP: No results for input(s): BNP in the last 72 hours. PT/INR:   Recent Labs     05/15/19  0845   PROTIME 10.4   INR 1.0     APTT:   Recent Labs     05/15/19  0845   APTT 24.5     CARDIAC ENZYMES:   Recent Labs     05/15/19  0845   TROPONINT <0.03     FASTING LIPID PANEL:  Lab Results   Component Value Date    CHOL 160 05/15/2019    HDL 52 05/15/2019    TRIG 64 05/15/2019     LIVER PROFILE:   Recent Labs     05/15/19  0843   AST 13   ALT 15   BILITOT 0.56   ALKPHOS 60          Physical Exam:  /66   Pulse 113   Temp 98.6 °F (37 °C) (Oral)   Resp 19   Ht 5' 6\" (1.676 m)   Wt 197 lb 1.5 oz (89.4 kg)   SpO2 93% Comment: ra  BMI 31.81 kg/m²   Alert, no distress.   PERRL, EOMI  Cranial nerve V- normal sensation  Nerve VII - symmetric  Fair speech and language function- patient has difficulty finding words during examination  Lungs- clear. Heart- regular. Abdomen- soft, non-distended, non-tender. No calf tenderness or edema. MSR- 2+ equal in bilateral upper and lower   Sensory: Intact in BUE and BLE to soft sensation. Motor: Muscle tone and bulk are normal bilaterally. Motor strength- 5/5 BUE and BLE    Impression:  Patient is a 60-year-old right handed female cortical stroke with aphasia    Recommendations:    1. The patient is doing well in physical and occupational therapy. Patient is at standby assist for ADLs and transfers. She is at supervision for bed mobility. She ambulated 100 feet with contact-guard assist using a rolling walker. I believe the patient would be okay to go home with family support and home health versus outpatient physical, occupational, and speech therapy. Outpatient would be the optimal therapy but if patient is unable to get to therapy home health with suffice. This was discussed with the patient who is in agreement and  is comfortable going home at her current level. The patient should continue with speech, physical and occupational therapy while in the hospital.  2. Neurology- neurology is managing post stroke. I discussed the above with the neurology service. Patient is still having workup. 3. DVT prophylaxis-on Lovenox. 4. Home support- the patient states that she lives with her adult son who was totally independent and does not require any assistance from her. She states that she does have support if needed. It was my pleasure to evaluate [address] [place] [address]  today. Please call with questions. Dilshad Dobson MD        This note is created with the assistance of a speech recognition program.  While intending to generate a document that actually reflects the content of the visit, the document can still have some errors including those of syntax and sound a like substitutions which may escape proof reading.   In such instances, actual meaning can be extrapolated by contextual diversion.

## 2019-05-17 NOTE — PLAN OF CARE
Problem: Infection:  Goal: Will remain free from infection  Description  Will remain free from infection  5/17/2019 0539 by Hetal Montgomery RN  Outcome: Ongoing  5/16/2019 2322 by Hetal Montgomery RN  Outcome: Ongoing     Problem: Safety:  Goal: Free from accidental physical injury  Description  Free from accidental physical injury  Outcome: Ongoing     Problem: Daily Care:  Goal: Daily care needs are met  Description  Daily care needs are met  Outcome: Ongoing     Problem: Pain:  Goal: Patient's pain/discomfort is manageable  Description  Patient's pain/discomfort is manageable  Outcome: Ongoing     Problem: Skin Integrity:  Goal: Skin integrity will stabilize  Description  Skin integrity will stabilize  Outcome: Ongoing     Problem: HEMODYNAMIC STATUS  Goal: Patient has stable vital signs and fluid balance  5/17/2019 0539 by Hetal Montgomery RN  Outcome: Ongoing  5/16/2019 2322 by Hetal Montgomery RN  Outcome: Ongoing     Problem: ACTIVITY INTOLERANCE/IMPAIRED MOBILITY  Goal: Mobility/activity is maintained at optimum level for patient  Outcome: Ongoing     Problem: COMMUNICATION IMPAIRMENT  Goal: Ability to express needs and understand communication  Outcome: Ongoing     Problem: Infection:  Goal: Will remain free from infection  Description  Will remain free from infection  5/17/2019 0539 by Hetal Montgomery RN  Outcome: Ongoing  5/16/2019 2322 by Hetal Montgomery RN  Outcome: Ongoing     Problem: Safety:  Goal: Free from accidental physical injury  Description  Free from accidental physical injury  Outcome: Ongoing     Problem: Daily Care:  Goal: Daily care needs are met  Description  Daily care needs are met  Outcome: Ongoing     Problem: Pain:  Goal: Patient's pain/discomfort is manageable  Description  Patient's pain/discomfort is manageable  Outcome: Ongoing     Problem: Skin Integrity:  Goal: Skin integrity will stabilize  Description  Skin integrity will stabilize  Outcome: Ongoing     Problem: HEMODYNAMIC

## 2019-05-18 VITALS
BODY MASS INDEX: 31 KG/M2 | DIASTOLIC BLOOD PRESSURE: 68 MMHG | SYSTOLIC BLOOD PRESSURE: 119 MMHG | HEART RATE: 84 BPM | RESPIRATION RATE: 17 BRPM | OXYGEN SATURATION: 94 % | TEMPERATURE: 98.6 F | HEIGHT: 66 IN | WEIGHT: 192.9 LBS

## 2019-05-18 PROCEDURE — 2060000000 HC ICU INTERMEDIATE R&B

## 2019-05-18 PROCEDURE — 97116 GAIT TRAINING THERAPY: CPT

## 2019-05-18 PROCEDURE — 6360000002 HC RX W HCPCS: Performed by: STUDENT IN AN ORGANIZED HEALTH CARE EDUCATION/TRAINING PROGRAM

## 2019-05-18 PROCEDURE — 2580000003 HC RX 258: Performed by: INTERNAL MEDICINE

## 2019-05-18 PROCEDURE — 97110 THERAPEUTIC EXERCISES: CPT

## 2019-05-18 PROCEDURE — 6370000000 HC RX 637 (ALT 250 FOR IP): Performed by: STUDENT IN AN ORGANIZED HEALTH CARE EDUCATION/TRAINING PROGRAM

## 2019-05-18 PROCEDURE — 99232 SBSQ HOSP IP/OBS MODERATE 35: CPT | Performed by: PSYCHIATRY & NEUROLOGY

## 2019-05-18 RX ORDER — ATORVASTATIN CALCIUM 80 MG/1
80 TABLET, FILM COATED ORAL NIGHTLY
Qty: 30 TABLET | Refills: 3 | Status: SHIPPED | OUTPATIENT
Start: 2019-05-18 | End: 2019-09-10 | Stop reason: SDUPTHER

## 2019-05-18 RX ADMIN — METOPROLOL TARTRATE 25 MG: 25 TABLET ORAL at 08:28

## 2019-05-18 RX ADMIN — VITAMIN D, TAB 1000IU (100/BT) 1000 UNITS: 25 TAB at 08:28

## 2019-05-18 RX ADMIN — ENOXAPARIN SODIUM 40 MG: 40 INJECTION, SOLUTION INTRAVENOUS; SUBCUTANEOUS at 08:28

## 2019-05-18 RX ADMIN — ASPIRIN 325 MG: 325 TABLET, COATED ORAL at 08:28

## 2019-05-18 RX ADMIN — Medication 10 ML: at 08:32

## 2019-05-18 RX ADMIN — LISINOPRIL 20 MG: 20 TABLET ORAL at 08:28

## 2019-05-18 RX ADMIN — OMEGA-3-ACID ETHYL ESTERS 1 G: 1 CAPSULE, LIQUID FILLED ORAL at 08:28

## 2019-05-18 ASSESSMENT — PAIN SCALES - GENERAL
PAINLEVEL_OUTOF10: 0

## 2019-05-18 NOTE — PROGRESS NOTES
assistance  Quality of Gait: Pt displays foot slap bilaterally. Distance: 300ft  Stairs/Curb  Stairs?: Yes  Stairs  # Steps : 2  Stairs Height: 6\"  Rails: Bilateral  Assistance: Contact guard assistance       Standing exercise program: Heel/toe raises, hip flexion, hip abduction,. Reps: 20x with RW for support. Balance exercise: 3x Romberg stance for 10 seconds each without UE support. Followed by 2x Tandem stance for 10 seconds without UE support. Assessment   Body structures, Functions, Activity limitations: Decreased functional mobility ; Decreased safe awareness;Decreased balance;Decreased ADL status; Decreased endurance;Decreased high-level IADLs;Decreased strength  Assessment: Pt ambulates 300ft and was able to complete stairs with CGA. Pt unsteady with stairs and higher level balance tasks. Pt would benefit from continued skilled physical therapy after discharge to work on gait deviations, balance reactions and strengthening. Prognosis: Good  REQUIRES PT FOLLOW UP: Yes  Activity Tolerance  Activity Tolerance: Patient Tolerated treatment well  Activity Tolerance: Pt requires rest breaks throughout. AM-PAC Score  -PAC Inpatient Mobility Raw Score : 17  AM-PAC Inpatient T-Scale Score : 42.13  Mobility Inpatient CMS 0-100% Score: 50.57  Mobility Inpatient CMS G-Code Modifier : CK          Goals  Short term goals  Time Frame for Short term goals: 14visits  Short term goal 1: Pt to ambulate with least resistive AD 200ft independently. Short term goal 2: Pt to negotiate stairs with step to pattern with bilateral handrails with supervision. Short term goal 3: Pt to display improved dynamic standing balance to good level in order to decrease risk for falls. Short term goal 4: Pt to complete all functional transfers independently to increase patient safety.      Plan    Plan  Times per week: 5x  Current Treatment Recommendations: Strengthening, Transfer Training, Endurance Training, Neuromuscular Re-education, Patient/Caregiver Education & Training, Equipment Evaluation, Education, & procurement, Balance Training, Gait Training, Home Exercise Program, Functional Mobility Training, Stair training, Safety Education & Training  Safety Devices  Type of devices:  All fall risk precautions in place, Gait belt, Patient at risk for falls, Left in chair, Nurse notified  Restraints  Initially in place: No     Therapy Time   Individual Concurrent Group Co-treatment   Time In 1413         Time Out 1441         Minutes 28         Timed Code Treatment Minutes: Dane Moreno [address] Paulina Valdes, PT

## 2019-05-18 NOTE — DISCHARGE SUMMARY
no weakness , numbness , bulbar or visual complaints . MRI of Head with acute left temporal parietal infarction with old right frontal , right occipital and left frontal infarctions . EKG NSR . Cholesterol 160 , LDL 95 , TG 64  On exam she is alert and oriented with occasional dysnomia otherwise normal language process with no focal motor ,sensory , bulbar or visual complaints   Impression . Acute left cerebral infarction  Multi infarct state      HONORIO LVEF 45 % . Mild MR . Mild AI . No thrombus or vegetation getting loop recorder placement . She is ambulating independantly in room . She is alert and oriented with only occasional dysnomia nonfocal exam .  PMR saw patient and feels that she can go home with home health. Plan:Discharge with home health PT, OT . Speech therapy . Xarelto 2.5 mg po bid , aspirin 81 mg po qd , lipitor 80 mg po qd. FU in office in 4 weeks   On the day of discharge the patient started feeling better and was ready to be discharged. All other sub-specialities involved in her care were ok with the discharge. Significant Diagnostics:   As above    Disposition:   home    Patient Instructions: Activity: activity as tolerated  Diet: regular diet  Follow-up with pcp in 1 month. Follow up with neurology in 3-4 weeks. Restrictions: Driving No, Swimming No, Operating heavy machinery No, Compromising heights No.  Greater than 35 minutes were spent in discharging the patient.           Jonnathan Nieves MD

## 2019-05-18 NOTE — PROGRESS NOTES
Active problem acute left cerebral infarction .  Multi infarct state . The condition is HONORIO LVEF 45 % . Mild MR . Mild AI . No thrombus or vegetation getting loop recorder placement . She is ambulating independantly in room . She is alert and oriented with only occasional dysnomia nonfocal exam .  PMR saw patient and feels that she can go home with home health. Patient presents with trouble processing her thoughts along with expressing herself . She awoke day of admission from sleep at  4:30 AM to go to bathroom awakening again at 8:30 AM with trouble sorting out her thoughts unable to dial telephone along with trouble expressing herself coming out with words . There is no weakness , numbness , bulbar or visual complaints . Significant medications aspirin 325 mg po qd , lipitor 80 mg po qd. Test  Head CT with old right fontal , right parietal infarction . CTA head and neck with no large vessel occlusion . MRI of Head with acute left temporal parietal infarction with old right frontal , right occipital and left frontal infarctions . EKG NSR . Cholesterol 160 , LDL 95 , TG 64. Cardiac 2 D echo normal LVF EF 45 % with hypokinesis mid to basal inferolateral wall . Grade 1 diastolic dysfunction . Mild MR and AI . EEG mild left hemispheric temporal slowing. HONORIO LVEF 45 % . Mild MR . Mild AI . No thrombus or vegetation       Past Medical History:   Diagnosis Date    Hypertension        Past Surgical History:   Procedure Laterality Date    CARPAL TUNNEL RELEASE Bilateral     CATARACT REMOVAL Bilateral     HIP SURGERY Right     KNEE SURGERY Bilateral        History reviewed. No pertinent family history. Social History     Socioeconomic History    Marital status:       Spouse name: None    Number of children: None    Years of education: None    Highest education level: None   Occupational History    None   Social Needs    Financial resource strain: None    Food insecurity:     Worry: None     Inability: None    Transportation needs:     Medical: None     Non-medical: None   Tobacco Use    Smoking status: Never Smoker    Smokeless tobacco: Never Used   Substance and Sexual Activity    Alcohol use: None    Drug use: None    Sexual activity: None   Lifestyle    Physical activity:     Days per week: None     Minutes per session: None    Stress: None   Relationships    Social connections:     Talks on phone: None     Gets together: None     Attends Christianity service: None     Active member of club or organization: None     Attends meetings of clubs or organizations: None     Relationship status: None    Intimate partner violence:     Fear of current or ex partner: None     Emotionally abused: None     Physically abused: None     Forced sexual activity: None   Other Topics Concern    None   Social History Narrative    None       Current Facility-Administered Medications   Medication Dose Route Frequency Provider Last Rate Last Dose    sodium chloride flush 0.9 % injection 10 mL  10 mL Intravenous 2 times per day Cami Reed MD   10 mL at 05/18/19 0832    sodium chloride flush 0.9 % injection 10 mL  10 mL Intravenous PRN Cami Reed MD        sodium chloride flush 0.9 % injection 10 mL  10 mL Intravenous 2 times per day Alicia Winkler MD   10 mL at 05/17/19 1015    sodium chloride flush 0.9 % injection 10 mL  10 mL Intravenous PRN Alicia Winkler MD        acetaminophen (TYLENOL) tablet 650 mg  650 mg Oral Q4H PRN Alicia Winkler MD        lisinopril (PRINIVIL;ZESTRIL) tablet 20 mg  20 mg Oral Daily Jesus Hardy MD   20 mg at 05/18/19 0828    metoprolol tartrate (LOPRESSOR) tablet 25 mg  25 mg Oral Daily Jesus Hardy MD   25 mg at 05/18/19 7497    vitamin D (CHOLECALCIFEROL) tablet 1,000 Units  1,000 Units Oral Daily Jesus Hardy MD   1,000 Units at 05/18/19 0828    sodium chloride flush 0.9 % injection 10 mL  10 mL Intravenous 2 times per day Jesus Hardy MD   10 mL at 05/17/19 2124    sodium chloride flush 0.9 % injection 10 mL  10 mL Intravenous PRN Dannie Valenzuela MD        magnesium hydroxide (MILK OF MAGNESIA) 400 MG/5ML suspension 30 mL  30 mL Oral Daily PRN Dannie Valenzuela MD        ondansetron (ZOFRAN) injection 4 mg  4 mg Intravenous Q6H PRN Dannie Valenzuela MD        enoxaparin (LOVENOX) injection 40 mg  40 mg Subcutaneous Daily Dannie Valenzuela MD   40 mg at 05/18/19 6935    aspirin tablet 325 mg  325 mg Oral Daily Dannie Valenzuela MD   325 mg at 05/18/19 5916    hydrALAZINE (APRESOLINE) injection 10 mg  10 mg Intravenous Q6H PRN Dannie Valenzuela MD        labetalol (NORMODYNE;TRANDATE) injection 10 mg  10 mg Intravenous Q4H PRN Dannie Valenzuela MD        atorvastatin (LIPITOR) tablet 80 mg  80 mg Oral Nightly Dannie Valenzuela MD   80 mg at 05/17/19 2116    omega-3 acid ethyl esters (LOVAZA) capsule 1 g  1 g Oral Daily Dannie Valenzuela MD   1 g at 05/18/19 5044       Allergies   Allergen Reactions    No Known Allergies        ROS:   Constitutional                  Negative for fever and chills   HEENT                            Negative for ear discharge, ear pain, nosebleed  Eyes                                Negative for photophobia, pain and discharge  Respiratory                      Negative for hemoptysis and sputum  Cardiovascular                Negative for orthopnea, claudication and PND  Gastrointestinal               Negative for abdominal pain, diarrhea, blood in stool  Musculoskeletal               Negative for joint pain, negative for myalgia  Skin                                 Negative for rash or itching  Endo/heme/allergies       Negative for polydipsia, environmental allergy  Psychiatric                       Negative for suicidal ideation.   Patient is not anxious    Vitals:    05/18/19 0800   BP: 110/65   Pulse: 82   Resp: 16   Temp: 98.2 °F (36.8 °C)   SpO2: 95%     Admission weight: 195 lb 1.7 oz (88.5 kg)    Neurological Examination  Constitutional .General exam well groomed   Head/ Ears /Nose/Throat/external ear . Normal exam  Neck and thyroid . Normal size. No bruits  Respiratory . Breathsounds clear bilaterally  Cardiovascular: Auscultation of heart with regular rate and rhythm   Musculoskeletal. Muscle bulk and tone normal                                                           Muscle strength 5/5 strength throughout                                                                                No dysmetria or dysdiadokinesis  No tremor   Normal fine motor  Orientation Alert and oriented x 3   Attention and concentration normal  Short term memory normal  Language process with fluent speech. Occasional dysnomia    Cranial nerve 2 normal acuety and visual fields  Cranial nerve 3, 4 and 6 . Extraocular muscles are intact . Pupils are equal and reactive   Cranial nerve 5 Intact corneal reflex. Normal facial sensation  Cranial nerve 7 normal exam   Cranial nerve 8. Grossly intact hearing   Cranial nerve 9 and 10. Symmetric palate elevation   Cranial nerve 11 , 5 out of 5 strength   Cranial Nerve 12 midline tongue . No atrophy  Sensation  Normal pinprick and light touch   Deep Tendon Reflexes normal  Plantar response flexor bilaterally    Assessment :    Acute left cerebral infarction  Multi infarct state     Plan:    Discharge with home health PT, OT . Speech therapy . Xarelto 2.5 mg po bid , aspirin 81 mg po qd , lipitor 80 mg po qd.  FU in office in 4 weeks

## 2019-05-18 NOTE — CARE COORDINATION
Discharge [facility] Case Management Department  Written by: Candelaria Buck RN    Patient Name: Modesto Alas  Attending Provider: Cornelia Nissen, *  Admit Date: 5/15/2019  1:41 PM  MRN: [id number]  Account: [de-identified]                     : 1950  Discharge Date:       Disposition: home with [facility]. CORAL reviewed and completed. All DC papers faxed to [facility].      Candelaria Buck RN

## 2019-06-19 ENCOUNTER — OFFICE VISIT (OUTPATIENT)
Dept: NEUROLOGY | Age: 69
End: 2019-06-19
Payer: MEDICARE

## 2019-06-19 VITALS
SYSTOLIC BLOOD PRESSURE: 132 MMHG | HEIGHT: 66 IN | BODY MASS INDEX: 31.31 KG/M2 | WEIGHT: 194.8 LBS | HEART RATE: 71 BPM | DIASTOLIC BLOOD PRESSURE: 77 MMHG

## 2019-06-19 DIAGNOSIS — I63.412 CEREBRAL INFARCTION DUE TO EMBOLISM OF LEFT MIDDLE CEREBRAL ARTERY (HCC): Primary | ICD-10-CM

## 2019-06-19 PROCEDURE — 99214 OFFICE O/P EST MOD 30 MIN: CPT | Performed by: PSYCHIATRY & NEUROLOGY

## 2019-06-19 ASSESSMENT — ENCOUNTER SYMPTOMS
RESPIRATORY NEGATIVE: 1
ALLERGIC/IMMUNOLOGIC NEGATIVE: 1
EYES NEGATIVE: 1
GASTROINTESTINAL NEGATIVE: 1

## 2019-06-19 NOTE — PROGRESS NOTES
Subjective:      Patient ID: Eligio Mandel is a 80 y.o. female. HPI   Active problem patient was seen at [facility] in May for acute left cerebral infarction . Imaging with prior Multi infarct state . The condition is  fron the hospital she went home undergoing home PT just having finished PT/OT and speech therapy . Her language has completely resolved with normal fluency and naming . She is has put walker aside at home although retains postural instability with no falls HONORIO LVEF 45 % . Mild MR . Mild AI . No thrombus or vegetation getting loop recorder placement . She is ambulating independantly in room . She is alert no weakness , numbness , bulbar or visual complaints . She was placed on COMPASS trial for cryptogenic stroke on xarelto 2.5 mg po bid ,aspirin 81 mg po qd and lipitor 80 mg po qd . Significant medications Xarelto 2.5 mg po bid , aspirin 81 mg po qd , lipitor 80 mg po qd . Test  CTA head and neck with no large vessel occlusion . MRI of Head with acute left temporal parietal infarction with old right frontal , right occipital and left frontal infarctions . EKG NSR . Cholesterol 160 , LDL 95 , TG 64. Cardiac 2 D echo normal LVF EF 45 % with hypokinesis mid to basal inferolateral wall . Grade 1 diastolic dysfunction . Mild MR and AI . EEG mild left hemispheric temporal slowing. HONORIO LVEF 45 % . Mild MR . Mild AI . No thrombus or vegetation        Past Medical History:   Diagnosis Date    Arthritis     Cataract     Cerebral artery occlusion with cerebral infarction ([address]     Hypertension        Past Surgical History:   Procedure Laterality Date    CARPAL TUNNEL RELEASE Bilateral     CATARACT REMOVAL Bilateral     HIP SURGERY Right     KNEE SURGERY Bilateral        Family History   Problem Relation Age of Onset    Stroke Mother     Stroke Paternal Grandmother        Social History     Socioeconomic History    Marital status:       Spouse name: None    Number of children: None    Years of Systems   Constitutional: Negative. HENT: Negative. Eyes: Negative. Respiratory: Negative. Cardiovascular: Negative. Gastrointestinal: Negative. Endocrine: Negative. Genitourinary: Negative. Musculoskeletal: Negative. Skin: Negative. Allergic/Immunologic: Negative. Neurological: Negative. Hematological: Negative. Psychiatric/Behavioral: Negative. Objective:   Physical Exam  Vitals:    06/19/19 1516   BP: 132/77   Pulse: 71     weight: 194 lb 12.8 oz (88.4 kg)     Neurological Examination  Constitutional .General exam well groomed   Head/ Ears /Nose/Throat/external ear . Normal exam  Neck and thyroid . Normal size. No bruits  Respiratory . Breathsounds clear bilaterally  Cardiovascular: Auscultation of heart with regular rate and rhythm   Musculoskeletal. Muscle bulk and tone normal                                                           Muscle strength 5/5 strength throughout                                                                                No dysmetria or dysdiadokinesis  No tremor   Normal fine motor  Gait imbalance   Orientation Alert and oriented x 3   Attention and concentration normal  Short term memory normal  Language process with fluent speech. Occasional dysnomia    Cranial nerve 2 normal acuety and visual fields  Cranial nerve 3, 4 and 6 . Extraocular muscles are intact . Pupils are equal and reactive   Cranial nerve 5 Intact corneal reflex. Normal facial sensation  Cranial nerve 7 normal exam   Cranial nerve 8. Grossly intact hearing   Cranial nerve 9 and 10. Symmetric palate elevation   Cranial nerve 11 , 5 out of 5 strength   Cranial Nerve 12 midline tongue . No atrophy  Sensation  Normal pinprick and light touch   Deep Tendon Reflexes normal  Plantar response flexor bilaterally    Assessment:       Diagnosis Orders   1.  Cerebral infarction due to embolism of left middle cerebral artery [facility])  Ambulatory referral to Occupational Therapy   Patient is to undergo driving evaluation .  She retains some gait instability to use walker for support       Plan:      Orders Placed This Encounter   Procedures    Ambulatory referral to Occupational Therapy     Referral Priority:   Routine     Referral Type:   Occupational Therapy     Number of Visits Requested:   1           Vivi Alfaro MD

## 2019-06-19 NOTE — LETTER
[facility] Neurology Specialist  [facility] [place] [address]  Phone: [phone]  Fax: [phone]    Keith Schmitz        June 21, 2019       Patient: Gissel Perry   MR Number: [id number]   YOB: 1950   Date of Visit: 6/19/2019       Dear Dr. Acosta Aid:    Thank you for the request for consultation for Terrence Bledsoe . Below are the relevant portions of my assessment and plan of care. Subjective:      Patient ID: Gissel Perry is a 80 y.o. female. HPI   Active problem patient was seen at [facility] in May for acute left cerebral infarction . Imaging with prior Multi infarct state . The condition is  fron the hospital she went home undergoing home PT just having finished PT/OT and speech therapy . Her language has completely resolved with normal fluency and naming . She is has put walker aside at home although retains postural instability with no falls HONORIO LVEF 45 % . Mild MR . Mild AI . No thrombus or vegetation getting loop recorder placement . She is ambulating independantly in room . She is alert no weakness , numbness , bulbar or visual complaints . She was placed on COMPASS trial for cryptogenic stroke on xarelto 2.5 mg po bid ,aspirin 81 mg po qd and lipitor 80 mg po qd . Significant medications Xarelto 2.5 mg po bid , aspirin 81 mg po qd , lipitor 80 mg po qd . Test  CTA head and neck with no large vessel occlusion . MRI of Head with acute left temporal parietal infarction with old right frontal , right occipital and left frontal infarctions . EKG NSR . Cholesterol 160 , LDL 95 , TG 64. Cardiac 2 D echo normal LVF EF 45 % with hypokinesis mid to basal inferolateral wall . Grade 1 diastolic dysfunction . Mild MR and AI . EEG mild left hemispheric temporal slowing. HONORIO LVEF 45 % . Mild MR . Mild AI .  No thrombus or vegetation        Past Medical History:   Diagnosis Date    Arthritis     Cataract  lisinopril (PRINIVIL;ZESTRIL) 20 MG tablet Take 40 mg by mouth daily       aspirin 81 MG tablet Take 81 mg by mouth daily      vitamin D (CHOLECALCIFEROL) 1000 UNIT TABS tablet Take 1,000 Units by mouth daily      Omega-3 Fatty Acids (OMEGA-3 FISH OIL) 1000 MG CAPS Take 1,000 mg by mouth daily        No current facility-administered medications for this visit. Allergies   Allergen Reactions    Watermelon [Citrullus Vulgaris] Anaphylaxis    No Known Allergies     Seasonal        Review of Systems   Constitutional: Negative. HENT: Negative. Eyes: Negative. Respiratory: Negative. Cardiovascular: Negative. Gastrointestinal: Negative. Endocrine: Negative. Genitourinary: Negative. Musculoskeletal: Negative. Skin: Negative. Allergic/Immunologic: Negative. Neurological: Negative. Hematological: Negative. Psychiatric/Behavioral: Negative. Objective:   Physical Exam  Vitals:    06/19/19 1516   BP: 132/77   Pulse: 71     weight: 194 lb 12.8 oz (88.4 kg)     Neurological Examination  Constitutional .General exam well groomed   Head/ Ears /Nose/Throat/external ear . Normal exam  Neck and thyroid . Normal size. No bruits  Respiratory . Breathsounds clear bilaterally  Cardiovascular: Auscultation of heart with regular rate and rhythm   Musculoskeletal. Muscle bulk and tone normal                                                           Muscle strength 5/5 strength throughout                                                                                No dysmetria or dysdiadokinesis  No tremor   Normal fine motor  Gait imbalance   Orientation Alert and oriented x 3   Attention and concentration normal  Short term memory normal  Language process with fluent speech. Occasional dysnomia    Cranial nerve 2 normal acuety and visual fields  Cranial nerve 3, 4 and 6 . Extraocular muscles are intact .  Pupils are equal and reactive Cranial nerve 5 Intact corneal reflex. Normal facial sensation  Cranial nerve 7 normal exam   Cranial nerve 8. Grossly intact hearing   Cranial nerve 9 and 10. Symmetric palate elevation   Cranial nerve 11 , 5 out of 5 strength   Cranial Nerve 12 midline tongue . No atrophy  Sensation  Normal pinprick and light touch   Deep Tendon Reflexes normal  Plantar response flexor bilaterally    Assessment:       Diagnosis Orders   1. Cerebral infarction due to embolism of left middle cerebral artery [facility])  Ambulatory referral to Occupational Therapy   Patient is to undergo driving evaluation . She retains some gait instability to use walker for support       Plan:      Orders Placed This Encounter   Procedures    Ambulatory referral to Occupational Therapy     Referral Priority:   Routine     Referral Type:   Occupational Therapy     Number of Visits Requested:   1           Chi [place] MD    If you have questions, please do not hesitate to call me. I look forward to following Sterling Taylor along with you.     Sincerely,        Chi [place] MD    CC providers:  Lily Rubin, [address] [place] [address] [place]: [phone]

## 2019-06-21 NOTE — COMMUNICATION BODY
education: None    Highest education level: None   Occupational History    None   Social Needs    Financial resource strain: None    Food insecurity:     Worry: None     Inability: None    Transportation needs:     Medical: None     Non-medical: None   Tobacco Use    Smoking status: Never Smoker    Smokeless tobacco: Never Used   Substance and Sexual Activity    Alcohol use: Not Currently    Drug use: Never    Sexual activity: None   Lifestyle    Physical activity:     Days per week: None     Minutes per session: None    Stress: None   Relationships    Social connections:     Talks on phone: None     Gets together: None     Attends Faith service: None     Active member of club or organization: None     Attends meetings of clubs or organizations: None     Relationship status: None    Intimate partner violence:     Fear of current or ex partner: None     Emotionally abused: None     Physically abused: None     Forced sexual activity: None   Other Topics Concern    None   Social History Narrative    None       Current Outpatient Medications   Medication Sig Dispense Refill    atorvastatin (LIPITOR) 80 MG tablet Take 1 tablet by mouth nightly 30 tablet 3    rivaroxaban (XARELTO) 2.5 MG TABS tablet Take 1 tablet by mouth 2 times daily 60 tablet 1    metoprolol tartrate (LOPRESSOR) 25 MG tablet Take 25 mg by mouth daily      alendronate (FOSAMAX) 70 MG tablet Take 70 mg by mouth every 7 days      lisinopril (PRINIVIL;ZESTRIL) 20 MG tablet Take 40 mg by mouth daily       aspirin 81 MG tablet Take 81 mg by mouth daily      vitamin D (CHOLECALCIFEROL) 1000 UNIT TABS tablet Take 1,000 Units by mouth daily      Omega-3 Fatty Acids (OMEGA-3 FISH OIL) 1000 MG CAPS Take 1,000 mg by mouth daily        No current facility-administered medications for this visit.         Allergies   Allergen Reactions    Watermelon [Citrullus Vulgaris] Anaphylaxis    No Known Allergies     Seasonal        Review of is to undergo driving evaluation .  She retains some gait instability to use walker for support       Plan:      Orders Placed This Encounter   Procedures    Ambulatory referral to Occupational Therapy     Referral Priority:   Routine     Referral Type:   Occupational Therapy     Number of Visits Requested:   1           Vivi Alfaro MD

## 2019-09-20 RX ORDER — ATORVASTATIN CALCIUM 80 MG/1
80 TABLET, FILM COATED ORAL NIGHTLY
Qty: 90 TABLET | Refills: 1 | Status: SHIPPED | OUTPATIENT
Start: 2019-09-20

## 2019-09-24 ENCOUNTER — OFFICE VISIT (OUTPATIENT)
Dept: NEUROLOGY | Age: 69
End: 2019-09-24
Payer: MEDICARE

## 2019-09-24 VITALS
DIASTOLIC BLOOD PRESSURE: 84 MMHG | HEIGHT: 66 IN | SYSTOLIC BLOOD PRESSURE: 123 MMHG | WEIGHT: 189.2 LBS | BODY MASS INDEX: 30.41 KG/M2 | HEART RATE: 110 BPM

## 2019-09-24 DIAGNOSIS — I63.412 CEREBRAL INFARCTION DUE TO EMBOLISM OF LEFT MIDDLE CEREBRAL ARTERY (HCC): Primary | ICD-10-CM

## 2019-09-24 PROCEDURE — 99214 OFFICE O/P EST MOD 30 MIN: CPT | Performed by: PSYCHIATRY & NEUROLOGY

## 2019-09-24 ASSESSMENT — ENCOUNTER SYMPTOMS
ALLERGIC/IMMUNOLOGIC NEGATIVE: 1
EYES NEGATIVE: 1
RESPIRATORY NEGATIVE: 1
GASTROINTESTINAL NEGATIVE: 1

## 2019-09-24 NOTE — LETTER
Cranial nerve 9 and 10. Symmetric palate elevation   Cranial nerve 11 , 5 out of 5 strength   Cranial Nerve 12 midline tongue . No atrophy  Sensation  Normal pinprick and light touch   Deep Tendon Reflexes normal  Plantar response flexor bilaterally    Assessment:       Diagnosis Orders   1. Cerebral infarction due to embolism of left middle cerebral artery [facility])     She is to continue current medication regimen       Plan:      As above         Jhoana Johns MD    If you have questions, please do not hesitate to call me. I look forward to following Jayce Duomnt along with you.     Sincerely,        Jhoana Johns MD    CC providers:  Teodoro Garner, [address] [place] [address] South: [phone]

## 2020-01-09 RX ORDER — RIVAROXABAN 2.5 MG/1
TABLET, FILM COATED ORAL
Qty: 180 TABLET | Refills: 0 | Status: SHIPPED | OUTPATIENT
Start: 2020-01-09 | End: 2020-04-06 | Stop reason: SDUPTHER

## 2020-03-16 ENCOUNTER — OFFICE VISIT (OUTPATIENT)
Dept: NEUROLOGY | Age: 70
End: 2020-03-16
Payer: MEDICARE

## 2020-03-16 VITALS
RESPIRATION RATE: 18 BRPM | WEIGHT: 190.2 LBS | SYSTOLIC BLOOD PRESSURE: 132 MMHG | HEART RATE: 106 BPM | HEIGHT: 66 IN | BODY MASS INDEX: 30.57 KG/M2 | DIASTOLIC BLOOD PRESSURE: 79 MMHG | TEMPERATURE: 98.2 F

## 2020-03-16 PROCEDURE — 99214 OFFICE O/P EST MOD 30 MIN: CPT | Performed by: NEUROMUSCULOSKELETAL MEDICINE, SPORTS MEDICINE

## 2020-03-16 NOTE — PATIENT INSTRUCTIONS
SURVEY:    You may be receiving a survey from [employer] regarding your visit today. Please complete the survey to enable us to provide the highest quality of care to you and your family. If you cannot score us a very good on any question, please call the office to discuss how we could have made your experience a very good one. Thank you. Patient Education        Stroke: Care Instructions  Your Care Instructions    You have had a stroke. This means that the blood flow to a part of your brain was blocked for some time, which damages the nerve cells in that part of the brain. The part of your body controlled by that part of your brain may not function properly now. The brain is an amazing organ that can heal itself to some degree. The stroke you had damaged part of your brain. But other parts of your brain may take over in some way for the damaged areas. You have already started this process. Your doctor will talk with you about what you can do to prevent another stroke. High blood pressure, high cholesterol, and diabetes are all risk factors for stroke. If you have any of these conditions, work with your doctor to make sure they are under control. Other risk factors for stroke include being overweight, smoking, and not getting regular exercise. Going home may be hard for you and your family. The more you can try to do for yourself, the better. Remember to take each day one at a time. Follow-up care is a key part of your treatment and safety. Be sure to make and go to all appointments, and call your doctor if you are having problems. It's also a good idea to know your test results and keep a list of the medicines you take. How can you care for yourself at home?    · Enter a stroke rehabilitation (rehab) program, if your doctor recommends it.  Physical, speech, and occupational therapies can help you manage bathing, dressing, eating, and other basics of daily living.     · Do not drive until your doctor says it is okay.     · It is normal to feel sad or depressed after a stroke. If these feelings last, talk to your doctor.     · If you are having problems with urine leakage, go to the bathroom at regular times, including when you first wake up and at bedtime. Also, limit fluids after dinner.     · If you are constipated, drink plenty of fluids, enough so that your urine is light yellow or clear like water. If you have kidney, heart, or liver disease and have to limit fluids, talk with your doctor before you increase the amount of fluids you drink. Set up a regular time for using the toilet. If you continue to have constipation, your doctor may suggest using a bulking agent, such as Metamucil, or a stool softener, laxative, or enema. Medicines    · Take your medicines exactly as prescribed. Call your doctor if you think you are having a problem with your medicine. You may be taking several medicines. ACE (angiotensin-converting enzyme) inhibitors, angiotensin II receptor blockers (ARBs), beta-blockers, diuretics (water pills), and calcium channel blockers control your blood pressure. Statins help lower cholesterol. Your doctor may also prescribe medicines for depression, pain, sleep problems, anxiety, or agitation.     · If your doctor has given you a blood thinner to prevent another stroke, be sure you get instructions about how to take your medicine safely. Blood thinners can cause serious bleeding problems.     · Do not take any over-the-counter medicines or herbal products without talking to your doctor first.     · If you take birth control pills or hormone therapy, talk to your doctor about whether they are right for you.    For family members and caregivers    · Make the home safe. Set up a room so that your loved one does not have to climb stairs. Be sure the bathroom is on the same floor. Move throw rugs and furniture that could cause falls. Make sure that the lighting is good.  Put grab bars and seats in tubs and showers.     · Find out what your loved one can do and what he or she needs help with. Try not to do things for your loved one that your loved one can do on his or her own. Help him or her learn and practice new skills.     · Visit and talk with your loved one often. Try doing activities together that you both enjoy, such as playing cards or board games. Keep in touch with your loved one's friends as much as you can. Encourage them to visit.     · Take care of yourself. Do not try to do everything yourself. Ask other family members to help. Eat well, get enough rest, and take time to do things that you enjoy. Keep up with your own doctor visits, and make sure to take your medicines regularly. Get out of the house as much as you can. Join a local support group. Find out if you qualify for home health care visits to help with rehab or for adult day care. When should you call for help? Call 911 anytime you think you may need emergency care. For example, call if:    · You have signs of another stroke. These may include:  ? Sudden numbness, tingling, weakness, or loss of movement in your face, arm, or leg, especially on only one side of your body. ? Sudden vision changes. ? Sudden trouble speaking. ? Sudden confusion or trouble understanding simple statements. ? Sudden problems with walking or balance. ? A sudden, severe headache that is different from past headaches. Call  911 even if these symptoms go away in a few minutes.    Call your doctor now or seek immediate medical care if:    · You have new symptoms that may be related to your stroke, such as falls or trouble swallowing.    Watch closely for changes in your health, and be sure to contact your doctor if you have any problems. Where can you learn more? Go to [email or web address].Narrable. org and sign in to your [employer] account. Enter G712 in the [place] box to learn more about \"Stroke: Care Instructions. \"

## 2020-03-16 NOTE — PROGRESS NOTES
NEUROLOGY Follow Up    Patient Name:  Dee Burton  :   1950  Clinic Visit Date: 3/16/2020    I saw Ms. Dee Burton  in the neurology clinic today for follow up a left cerebral ischemic infarction in May 2019, when she had presented with an acute confusional state and  transient aphasia. MRI of the brain showed a  left posterior temporal ischemic stroke. Echocardiogram  had demonstrated an ejection fraction of 45%. No intramural thrombus. She had been started on Xarelto and  she has doing well since then. No new neurologic symptoms since then. No headache, blurred vision double vision slurred speech facial numbness tingling or extremity weakness or numbness. No seizures    REVIEW OF SYSTEMS    Constitutional Weight changes: absent, change in appetite: absent Fatigue: absent; Fevers : absent, Any recent hospitalizations:  absent   HEENT Ears: normal,  Visual disturbance: absent   Respiratory Shortness of breath: absent, choking:  absent, Cough: absent, Snoring : absent   Cardiovascular Chest pain: absent, Leg swelling :absent, palpitations : absent, fainting : absent   GI Constipation: absent, Diarrhea: absent, Swallowing change: absent    Urinary frequency: absent, Urinary urgency: absent, Urinary incontinence: absent   Musculoskeletal Neck pain: absent, Back pain: absent, Stiffness: absent, Muscle pain: absent, Joint pain: absent, restless leg : absent   Dermatological Hair loss: absent, Skin changes: absent   Neurological Confusion: absent, Trouble concentrating: absent, Seizures: absent;  Memory loss: absent, balance problem: absent, Dizziness: absent, vertigo: absent, Weakness: absent, Numbness absent, Tremor: absent, Spasm: absent, involuntary movement: absent, Speech difficulty: absent, Headache: absent, Light sensitivity: absent   Psychiatric Anxiety: absent, Depression  absent, drug abuse: absent, Hallucination: absent, mood disorder: absent, Suicidal ideations absent   Hematologic Abnormal bleeding: absent, Anemia: absent, Lymph gland changes: absent Clotting disorder: absent     Past Medical History:   Diagnosis Date    Arthritis     Cataract     [facility]erebral artery occlusion with cerebral infarction ([facility] [address]     Hypertension        Past Surgical History:   Procedure Laterality Date    CARPAL TUNNEL RELEASE Bilateral     CATARACT REMOVAL Bilateral     HIP SURGERY Right     KNEE SURGERY Bilateral        Social History     Socioeconomic History    Marital status:       Spouse name: Not on file    Number of children: Not on file    Years of education: Not on file    Highest education level: Not on file   Occupational History    Not on file   Social Needs    Financial resource strain: Not on file    Food insecurity     Worry: Not on file     Inability: Not on file    Transportation needs     Medical: Not on file     Non-medical: Not on file   Tobacco Use    Smoking status: Never Smoker    Smokeless tobacco: Never Used   Substance and Sexual Activity    Alcohol use: Not Currently    Drug use: Never    Sexual activity: Not on file   Lifestyle    Physical activity     Days per week: Not on file     Minutes per session: Not on file    Stress: Not on file   Relationships    Social connections     Talks on phone: Not on file     Gets together: Not on file     Attends Scientologist service: Not on file     Active member of club or organization: Not on file     Attends meetings of clubs or organizations: Not on file     Relationship status: Not on file    Intimate partner violence     Fear of current or ex partner: Not on file     Emotionally abused: Not on file     Physically abused: Not on file     Forced sexual activity: Not on file   Other Topics Concern    Not on file   Social History Narrative    Not on file       Family History   Problem Relation Age of Onset    Stroke Mother     Stroke Paternal Grandmother        Current Outpatient Medications   Medication Sig Dispense REFLEXES: 2+ and symmetrical in both the upper and lower limbs. GAIT: There is no ataxia. IMPRESSION:    1. Cerebral ischemic infarction of  cardioembolic etiology . Doing well on Xarelto any recurrent strokelike symptoms. 2.  Hyperlipidemia  3. Hypertension. PLAN:    1. Continue Xarelto and aspirin. 2.  Follow-up in 6 months       NOTE: This neurology evaluation is part of outpatient coverage at [facility]  1-2 days per week. Patients requiring frequent evaluations or uncomfortable with potential 3-4 day turnaround on questions or calls  may be better served by a neurologist in the area full time. [facility] at [facility]. [place] is available for outpatient visits and procedures including EMG/NCS. Non-[facility] neurologists also practice in [facility] (Dr. Xavier Cowan) and [place] (June Hoskins).        Adrian Douglas MD   3/16/2020  4:34 PM

## 2020-04-06 ENCOUNTER — TELEPHONE (OUTPATIENT)
Dept: NEUROLOGY | Age: 70
End: 2020-04-06

## 2020-04-06 RX ORDER — RIVAROXABAN 2.5 MG/1
2.5 TABLET, FILM COATED ORAL 2 TIMES DAILY
Qty: 60 TABLET | Refills: 5 | Status: SHIPPED | OUTPATIENT
Start: 2020-04-06 | End: 2020-09-21

## 2020-09-21 ENCOUNTER — OFFICE VISIT (OUTPATIENT)
Dept: NEUROLOGY | Age: 70
End: 2020-09-21
Payer: MEDICARE

## 2020-09-21 VITALS
HEART RATE: 110 BPM | DIASTOLIC BLOOD PRESSURE: 78 MMHG | TEMPERATURE: 97.8 F | RESPIRATION RATE: 18 BRPM | HEIGHT: 66 IN | BODY MASS INDEX: 30.42 KG/M2 | WEIGHT: 189.3 LBS | SYSTOLIC BLOOD PRESSURE: 127 MMHG

## 2020-09-21 PROCEDURE — 99213 OFFICE O/P EST LOW 20 MIN: CPT | Performed by: NEUROMUSCULOSKELETAL MEDICINE, SPORTS MEDICINE

## 2020-09-21 NOTE — PROGRESS NOTES
absent, Lymph gland changes: absent Clotting disorder: absent     Past Medical History:   Diagnosis Date    Arthritis     Cataract     Cerebral artery occlusion with cerebral infarction ([facility] [address]     Hypertension        Past Surgical History:   Procedure Laterality Date    CARPAL TUNNEL RELEASE Bilateral     CATARACT REMOVAL Bilateral     HIP SURGERY Right     KNEE SURGERY Bilateral        Social History     Socioeconomic History    Marital status:       Spouse name: Not on file    Number of children: Not on file    Years of education: Not on file    Highest education level: Not on file   Occupational History    Not on file   Social Needs    Financial resource strain: Not on file    Food insecurity     Worry: Not on file     Inability: Not on file    Transportation needs     Medical: Not on file     Non-medical: Not on file   Tobacco Use    Smoking status: Never Smoker    Smokeless tobacco: Never Used   Substance and Sexual Activity    Alcohol use: Not Currently    Drug use: Never    Sexual activity: Not on file   Lifestyle    Physical activity     Days per week: Not on file     Minutes per session: Not on file    Stress: Not on file   Relationships    Social connections     Talks on phone: Not on file     Gets together: Not on file     Attends Jainism service: Not on file     Active member of club or organization: Not on file     Attends meetings of clubs or organizations: Not on file     Relationship status: Not on file    Intimate partner violence     Fear of current or ex partner: Not on file     Emotionally abused: Not on file     Physically abused: Not on file     Forced sexual activity: Not on file   Other Topics Concern    Not on file   Social History Narrative    Not on file       Family History   Problem Relation Age of Onset    Stroke Mother     Stroke Paternal Grandmother        Current Outpatient Medications   Medication Sig Dispense Refill    rivaroxaban (Deven Fraser) 2.5 MG TABS tablet Take 1 tablet by mouth 2 times daily 60 tablet 5    atorvastatin (LIPITOR) 80 MG tablet TAKE 1 TABLET BY MOUTH NIGHTLY 90 tablet 1    metoprolol tartrate (LOPRESSOR) 25 MG tablet Take 25 mg by mouth daily      alendronate (FOSAMAX) 70 MG tablet Take 70 mg by mouth every 7 days      lisinopril (PRINIVIL;ZESTRIL) 20 MG tablet Take 40 mg by mouth daily       aspirin 81 MG tablet Take 81 mg by mouth daily      Omega-3 Fatty Acids (OMEGA-3 FISH OIL) 1000 MG CAPS Take 500 mg by mouth daily       vitamin D (CHOLECALCIFEROL) 1000 UNIT TABS tablet Take 1,000 Units by mouth daily       No current facility-administered medications for this visit. DATA:  Lab Results   Component Value Date    WBC 6.2 05/16/2019    HGB 12.5 05/16/2019     05/16/2019    CHOL 160 05/15/2019    TRIG 64 05/15/2019    HDL 52 05/15/2019    ALT 15 05/15/2019    AST 13 05/15/2019     05/16/2019    K 3.9 05/16/2019     05/16/2019    CREATININE 0.71 05/16/2019    BUN 11 05/16/2019    CO2 26 05/16/2019    INR 1.0 05/15/2019    LABA1C 6.2 (H) 05/15/2019       /78 (Site: Right Upper Arm, Position: Sitting, Cuff Size: Medium Adult)   Pulse 110   Temp 97.8 °F (36.6 °C) (Temporal)   Resp 18   Ht 5' 6\" (1.676 m)   Wt 189 lb 4.8 oz (85.9 kg)   BMI 30.55 kg/m²     NEUROLOGICAL EXAMINATION:     MENTAL STATUS: Patient is alert and oriented x3. There is no confusion or aphasia. Memory is normal.     CRANIAL NERVES: III-XII are within normal limits. MOTOR EXAMINATION: Muscle tone is normal in all the limbs. There is no focal extremity weakness. Muscle strength is 5/5 in both upper and lower limbs. There are no abnormal limb movements. SENSORY EXAMINATION: Normal.     STRETCH REFLEXES: 1+ in both the upper and lower limbs. GAIT: There is no ataxia. IMPRESSION: History of left posterior temporal  ischemic infarction in May 2019.   Doing well on combination of Xarelto and

## 2020-09-21 NOTE — PATIENT INSTRUCTIONS
SURVEY:    You may be receiving a survey from [employer] regarding your visit today. Please complete the survey to enable us to provide the highest quality of care to you and your family. If you cannot score us a very good on any question, please call the office to discuss how we could have made your experience a very good one. Thank you. Patient Education        Learning About Coronavirus [phone])  Coronavirus [phone]): Overview  What is coronavirus (ZWTIP-56)? The coronavirus disease (COVID-19) is caused by a virus. It is an illness that was first found in [place], [place], in December 2019. It has since spread worldwide. The virus can cause fever, cough, and trouble breathing. In severe cases, it can cause pneumonia and make it hard to breathe without help. It can cause death. Coronaviruses are a large group of viruses. They cause the common cold. They also cause more serious illnesses like Middle East respiratory syndrome (MERS) and severe acute respiratory syndrome (SARS). COVID-19 is caused by a novel coronavirus. That means it's a new type that has not been seen in people before. This virus spreads person-to-person through droplets from coughing and sneezing. It can also spread when you are close to someone who is infected. And it can spread when you touch something that has the virus on it, such as a doorknob or a tabletop. What can you do to protect yourself from coronavirus (COVID-19)? The best way to protect yourself from getting sick is to:  · Avoid areas where there is an outbreak. · Avoid contact with people who may be infected. · Wash your hands often with soap or alcohol-based hand sanitizers. · Avoid crowds and try to stay at least 6 feet away from other people. · Wash your hands often, especially after you cough or sneeze. Use soap and water, and scrub for at least 20 seconds. If soap and water aren't available, use an alcohol-based hand .   · Avoid touching your mouth, nose, and eyes. What can you do to avoid spreading the virus to others? To help avoid spreading the virus to others:  · Cover your mouth with a tissue when you cough or sneeze. Then throw the tissue in the trash. · Use a disinfectant to clean things that you touch often. · Wear a cloth face cover if you have to go to public areas. · Stay home if you are sick or have been exposed to the virus. Don't go to school, work, or public areas. And don't use public transportation, ride-shares, or taxis unless you have no choice. · If you are sick:  ? Leave your home only if you need to get medical care. But call the doctor's office first so they know you're coming. And wear a face cover. ? Wear the face cover whenever you're around other people. It can help stop the spread of the virus when you cough or sneeze. ? Clean and disinfect your home every day. Use household  and disinfectant wipes or sprays. Take special care to clean things that you grab with your hands. These include doorknobs, remote controls, phones, and handles on your refrigerator and microwave. And don't forget countertops, tabletops, bathrooms, and computer keyboards. When to call for help  [id number] anytime you think you may need emergency care. For example, call if:  · You have severe trouble breathing. (You can't talk at all.)  · You have constant chest pain or pressure. · You are severely dizzy or lightheaded. · You are confused or can't think clearly. · Your face and lips have a blue color. · You pass out (lose consciousness) or are very hard to wake up. Call your doctor now if you develop symptoms such as:  · Shortness of breath. · Fever. · Cough. If you need to get care, call ahead to the doctor's office for instructions before you go. Make sure you wear a face cover to prevent exposing other people to the virus. Where can you get the latest information?   The following health organizations are tracking and studying this virus. Their websites contain the most up-to-date information. Milo Sylvester also learn what to do if you think you may have been exposed to the virus. · U.S. Centers for Disease Control and Prevention (CDC): The CDC provides updated news about the disease and travel advice. The website also tells you how to prevent the spread of infection. www.cdc.gov  · World Health Organization [facility]): WHO offers information about the virus outbreaks. WHO also has travel advice. www.who.int  Current as of: May 8, 2020               Content Version: 12.5  © 0995-3366 Healthwise, Incorporated. Care instructions adapted under license by [facility] ([facility]). If you have questions about a medical condition or this instruction, always ask your healthcare professional. [address] any warranty or liability for your use of this information.

## 2023-10-03 ENCOUNTER — HOSPITAL ENCOUNTER
Age: 73
Discharge: HOME | End: 2023-10-03
Payer: MEDICARE

## 2023-10-03 DIAGNOSIS — I12.9: ICD-10-CM

## 2023-10-03 DIAGNOSIS — R31.0: ICD-10-CM

## 2023-10-03 DIAGNOSIS — N28.1: ICD-10-CM

## 2023-10-03 DIAGNOSIS — E11.65: ICD-10-CM

## 2023-10-03 DIAGNOSIS — Z79.899: Primary | ICD-10-CM

## 2023-10-03 LAB
ADD MANUAL DIFF: NO
ALANINE AMINOTRANSFERASE: 23 U/L (ref 14–59)
ANION GAP: 16.8
BLOOD UREA NITROGEN: 8 MG/DL (ref 7–18)
CALCIUM: 9.3 MG/DL (ref 8.5–10.1)
CARBON DIOXIDE: 25.3 MMOL/L (ref 21–32)
CHLORIDE: 102 MMOL/L (ref 98–107)
CHOLESTEROL: 122 MG/DL (ref ?–200)
CO2 BLD-SCNC: 25.3 MMOL/L (ref 21–32)
ESTIMATED GFR (AFRICAN AMERICA: >60 (ref 60–?)
ESTIMATED GFR (NON-AFRICAN AME: 55 (ref 60–?)
GLUCOSE BLD-MCNC: 111 MG/DL (ref 74–106)
GLUCOSE URINE UA: NEGATIVE MG/DL
HCT VFR BLD CALC: 37.2 % (ref 36–48)
HDL CHOLESTEROL: 64 MG/DL (ref 40–60)
HEMATOCRIT: 37.2 % (ref 36–48)
HEMOGLOBIN: 12.2 G/DL (ref 12–16)
IMMATURE GRANULOCYTES ABS AUTO: 0.02 10^3/UL (ref 0–0.03)
IMMATURE GRANULOCYTES PCT AUTO: 0.2 % (ref 0–0.5)
LYMPHOCYTES  ABSOLUTE AUTO: 2.3 10^3/UL (ref 1.2–3.8)
MCV RBC: 97.4 FL (ref 81–99)
MEAN CORPUSCULAR HEMOGLOBIN: 31.9 PG (ref 26.7–34)
MEAN CORPUSCULAR HGB CONC: 32.8 G/DL (ref 29.9–35.2)
MEAN CORPUSCULAR VOLUME: 97.4 FL (ref 81–99)
PLATELET # BLD: 255 10^3/UL (ref 150–450)
PLATELET COUNT: 255 10^3/UL (ref 150–450)
POTASSIUM SERPLBLD-SCNC: 4.1 MMOL/L (ref 3.5–5.1)
POTASSIUM: 4.1 MMOL/L (ref 3.5–5.1)
RED BLOOD COUNT: 3.82 10^6/UL (ref 4.2–5.4)
SODIUM BLD-SCNC: 140 MMOL/L (ref 136–145)
SODIUM: 140 MMOL/L (ref 136–145)
TRIGLYCERIDES: 62 MG/DL (ref ?–150)
VLDL CHOLESTEROL: 12.4 MG/DL
WBC # BLD: 8.5 10^3/UL (ref 4–11)
WHITE BLOOD COUNT: 8.5 10^3/UL (ref 4–11)

## 2023-10-03 PROCEDURE — 80061 LIPID PANEL: CPT

## 2023-10-03 PROCEDURE — 74177 CT ABD & PELVIS W/CONTRAST: CPT

## 2023-10-03 PROCEDURE — 83036 HEMOGLOBIN GLYCOSYLATED A1C: CPT

## 2023-10-03 PROCEDURE — 85025 COMPLETE CBC W/AUTO DIFF WBC: CPT

## 2023-10-03 PROCEDURE — 36415 COLL VENOUS BLD VENIPUNCTURE: CPT

## 2023-10-03 PROCEDURE — 84460 ALANINE AMINO (ALT) (SGPT): CPT

## 2023-10-03 PROCEDURE — 81001 URINALYSIS AUTO W/SCOPE: CPT

## 2023-10-03 PROCEDURE — 82043 UR ALBUMIN QUANTITATIVE: CPT

## 2023-10-03 PROCEDURE — 80048 BASIC METABOLIC PNL TOTAL CA: CPT

## 2023-10-03 NOTE — CT_ITS
[address] [address] 
     [phone] 
  
  
Patient Name: 
AMADEO BARCENAS 
  
MRN: [id number]    YOB: 1950    Sex: F 
Assigned Patient Location: LAB 
Current Patient Location: LAB 
Accession/Order Number: [id number] 
Exam Date: 10/03/2023  14:23    Report Date: 10/03/2023  21:16 
  
At the request of: 
LARA VILLALOBOS   
  
Procedure:  CT abdomen pelvis w con 
  
CLINICAL HISTORY: Gross hematuria R31.0. 
  
EXAMINATION: Enhanced CT scan of the abdomen and pelvis 10/3/2023. 
  
COMPARISON: None. 
  
TECHNIQUE: 3 mm axial images from lung bases through ischial tuberosities  
following administration of intravenous as well as oral contrast were  
obtained.  
Sagittal and coronal reconstructions were performed. 
  
FINDINGS: The visualized lung bases seem normal. The heart size seems normal. 
  
CT ABDOMEN: The liver demonstrates mild fatty infiltration without discrete  
lesions. There is suggestion of cholelithiasis. The spleen, adrenal glands,  
pancreas seems normal. There is slight lobulated appearance to the right as  
well as left kidney with few parapelvic cysts in the left as well as there is  
some scarring involving the upper pole of the right kidney. In the upper to  
midpole posteriorly in the right kidney there is a low-attenuation lesion  
measuring 2.0 cm in size with average Hounsfield units of approximately 7.  
There is no nephrolithiasis. There is no perinephric fat stranding. No  
definite  
ureterolithiasis is seen. The abdominal aorta is moderately atherosclerotic.  
There is no retroperitoneal or mesenteric adenopathy. The bowel loops are of  
normal caliber. Appendix appears normal. 
  
CT PELVIS: The uterus appears irregular, has no normal shape. There is  
increased density which is hypodense appearance in the uterine canal without  
extension beyond the uterine canal. This measures approximately 5.5 x 6.4 cm.  
The ovaries are postmenopausal. The bladder seems normal. There is no  
ureterolithiasis. There is no pelvic adenopathy. There are no discrete focal  
fluid collections. There is grade 1 spondylolysis of L4 with respect to L5.  
There is degenerative disc disease with vacuum disc phenomena at L3-L4. 
  
ORDER #: [id number] CT/CT abdomen pelvis w con  
IMPRESSION:  
   
1. There is no nephro or ureterolithiasis.  
   
2. There are few simple appearing cysts in the right as well as left kidney   
with some scarring along the upper pole of the right kidney.  
   
3. No definite mass which is suspicious is seen in the kidneys or the bladder.  
   
4. Normal appendix.  
   
5. There is hypodensity to the uterine canal, concerning for an endometrial   
mass. Further evaluation with pelvic ultrasound might be of value. This could   
be further evaluated with direct hysteroscopy as well. The findings are   
suspicious for neoplastic process. Ultrasound performed on 9/6/2019   
demonstrated normal-appearing uterus.  
   
   
Electronically authenticated by: FLORIAN BARRERA   Date: 10/03/2023  21:16

## 2023-10-09 ENCOUNTER — OFFICE VISIT (OUTPATIENT)
Dept: OBGYN | Age: 73
End: 2023-10-09

## 2023-10-09 ENCOUNTER — HOSPITAL ENCOUNTER (OUTPATIENT)
Age: 73
Setting detail: SPECIMEN
Discharge: HOME | End: 2023-10-09
Payer: MEDICARE

## 2023-10-09 VITALS
WEIGHT: 175 LBS | DIASTOLIC BLOOD PRESSURE: 72 MMHG | SYSTOLIC BLOOD PRESSURE: 128 MMHG | BODY MASS INDEX: 28.12 KG/M2 | HEIGHT: 66 IN

## 2023-10-09 DIAGNOSIS — N95.0 PMB (POSTMENOPAUSAL BLEEDING): ICD-10-CM

## 2023-10-09 DIAGNOSIS — Z01.419 ENCOUNTER FOR ROUTINE GYNECOLOGIC EXAMINATION IN MEDICARE PATIENT: Primary | ICD-10-CM

## 2023-10-09 DIAGNOSIS — Z01.419 ENCOUNTER FOR ROUTINE GYNECOLOGIC EXAMINATION IN MEDICARE PATIENT: ICD-10-CM

## 2023-10-09 PROCEDURE — 88305 TISSUE EXAM BY PATHOLOGIST: CPT

## 2023-10-09 PROCEDURE — 88341 IMHCHEM/IMCYTCHM EA ADD ANTB: CPT

## 2023-10-09 PROCEDURE — 88342 IMHCHEM/IMCYTCHM 1ST ANTB: CPT

## 2023-10-09 PROCEDURE — G0123 SCREEN CERV/VAG THIN LAYER: HCPCS

## 2023-10-09 PROCEDURE — G0145 SCR C/V CYTO,THINLAYER,RESCR: HCPCS

## 2023-10-09 NOTE — PROGRESS NOTES
PROBLEM VISIT     Date of service: 10/9/2023    Aubrie Vila  Is a 68 y.o.  female    PT's PCP is: Yadira Balbuena DO     : 1950                                             Subjective:       No LMP recorded. Patient is postmenopausal.     OB History   No obstetric history on file. Social History     Tobacco Use   Smoking Status Never   Smokeless Tobacco Never        Social History     Substance and Sexual Activity   Alcohol Use Not Currently       Social History     Substance and Sexual Activity   Sexual Activity Not on file       Allergies: Watermelon [citrullus vulgaris], No known allergies, and Seasonal    Chief Complaint   Patient presents with    Consultation     Pt is here today as a referral from Dr Melissa Slater for an endometrial mass. Referral is in . Last Yearly:  aprox 3 years    Last pap: aprox 3 years    Last HPV: unknown      NURSE: LMD    PE:  Vital Signs  Blood pressure 128/72, height 5' 6\" (1.676 m), weight 175 lb (79.4 kg). Labs:    No results found for this visit on 10/09/23. HPI: The patient is here today with a problem with postmenopausal bleeding. This happened approximately 3 years ago when she was placed on blood thinners. She stated she received a work-up by Dr. Peterson Cifuentes and it was negative. She has been spotting just a small amount of blood again for the past month. This prompted Dr. Melissa Slater to do a CT scan and other lab work. Yes PT denies fever, chills, nausea and vomiting       Objective  Lymphatic:   no lymphadenopathy  Heent:   negative   Cor: regular rate and rhythm, no murmurs              Pul:clear to auscultation bilaterally- no wheezes, rales or rhonchi, normal air movement, no respiratory distress      GI: Abdomen soft, non-tender.  BS normal. No masses,  No organomegaly           Extremities: normal strength, tone, and muscle mass, replacement scars on both knees   Breasts: Breast: Not examined   Pelvic Exam: GENITAL/URINARY:

## 2023-10-13 LAB — SURGICAL PATHOLOGY REPORT: NORMAL

## 2023-10-16 DIAGNOSIS — C54.1 ENDOMETRIAL CANCER (HCC): Primary | ICD-10-CM

## 2023-10-18 ENCOUNTER — OFFICE VISIT (OUTPATIENT)
Dept: GYNECOLOGIC ONCOLOGY | Age: 73
End: 2023-10-18

## 2023-10-18 ENCOUNTER — HOSPITAL ENCOUNTER (OUTPATIENT)
Age: 73
Discharge: HOME | End: 2023-10-18
Payer: MEDICARE

## 2023-10-18 VITALS
BODY MASS INDEX: 27.8 KG/M2 | HEIGHT: 66 IN | DIASTOLIC BLOOD PRESSURE: 87 MMHG | WEIGHT: 173 LBS | HEART RATE: 116 BPM | OXYGEN SATURATION: 98 % | SYSTOLIC BLOOD PRESSURE: 131 MMHG

## 2023-10-18 DIAGNOSIS — N95.0 POST-MENOPAUSAL BLEEDING: ICD-10-CM

## 2023-10-18 DIAGNOSIS — Z23 NEED FOR SHINGLES VACCINE: ICD-10-CM

## 2023-10-18 DIAGNOSIS — K59.00 CONSTIPATION, UNSPECIFIED CONSTIPATION TYPE: ICD-10-CM

## 2023-10-18 DIAGNOSIS — C54.1 MALIGNANT NEOPLASM OF ENDOMETRIUM (HCC): Primary | ICD-10-CM

## 2023-10-18 DIAGNOSIS — Z23 INFLUENZA VACCINE NEEDED: ICD-10-CM

## 2023-10-18 LAB
ALBUMIN SERPL-MCNC: 3.9 G/DL (ref 3.5–5.2)
ALBUMIN/GLOB SERPL: 1.3 {RATIO} (ref 1–2.5)
ALP SERPL-CCNC: 71 U/L (ref 35–104)
ALT SERPL-CCNC: 12 U/L (ref 5–33)
ANION GAP SERPL CALCULATED.3IONS-SCNC: 13 MMOL/L (ref 9–17)
AST SERPL-CCNC: 19 U/L
BASOPHILS # BLD: 0.06 K/UL (ref 0–0.2)
BASOPHILS NFR BLD: 1 % (ref 0–2)
BILIRUB SERPL-MCNC: 0.6 MG/DL (ref 0.3–1.2)
BUN SERPL-MCNC: 10 MG/DL (ref 8–23)
CALCIUM SERPL-MCNC: 9.4 MG/DL (ref 8.6–10.4)
CHLORIDE SERPL-SCNC: 100 MMOL/L (ref 98–107)
CO2 SERPL-SCNC: 23 MMOL/L (ref 20–31)
CREAT SERPL-MCNC: 0.8 MG/DL (ref 0.5–0.9)
EOSINOPHIL # BLD: 0.08 K/UL (ref 0–0.44)
EOSINOPHILS RELATIVE PERCENT: 1 % (ref 1–4)
ERYTHROCYTE [DISTWIDTH] IN BLOOD BY AUTOMATED COUNT: 13.6 % (ref 11.8–14.4)
GFR SERPL CREATININE-BSD FRML MDRD: >60 ML/MIN/1.73M2
GLUCOSE SERPL-MCNC: 115 MG/DL (ref 70–99)
HCT VFR BLD AUTO: 35.4 % (ref 36.3–47.1)
HGB BLD-MCNC: 11.1 G/DL (ref 11.9–15.1)
IMM GRANULOCYTES # BLD AUTO: <0.03 K/UL (ref 0–0.3)
IMM GRANULOCYTES NFR BLD: 0 %
LYMPHOCYTES NFR BLD: 1.93 K/UL (ref 1.1–3.7)
LYMPHOCYTES RELATIVE PERCENT: 25 % (ref 24–43)
MCH RBC QN AUTO: 31.4 PG (ref 25.2–33.5)
MCHC RBC AUTO-ENTMCNC: 31.4 G/DL (ref 28.4–34.8)
MCV RBC AUTO: 100.3 FL (ref 82.6–102.9)
MONOCYTES NFR BLD: 0.69 K/UL (ref 0.1–1.2)
MONOCYTES NFR BLD: 9 % (ref 3–12)
NEUTROPHILS NFR BLD: 64 % (ref 36–65)
NEUTS SEG NFR BLD: 5.08 K/UL (ref 1.5–8.1)
NRBC BLD-RTO: 0 PER 100 WBC
PLATELET # BLD AUTO: 322 K/UL (ref 138–453)
PMV BLD AUTO: 9.8 FL (ref 8.1–13.5)
POTASSIUM SERPL-SCNC: 4.1 MMOL/L (ref 3.7–5.3)
PROT SERPL-MCNC: 6.9 G/DL (ref 6.4–8.3)
RBC # BLD AUTO: 3.53 M/UL (ref 3.95–5.11)
SODIUM SERPL-SCNC: 136 MMOL/L (ref 135–144)
WBC OTHER # BLD: 7.9 K/UL (ref 3.5–11.3)

## 2023-10-18 PROCEDURE — 85025 COMPLETE CBC W/AUTO DIFF WBC: CPT

## 2023-10-18 PROCEDURE — 82728 ASSAY OF FERRITIN: CPT

## 2023-10-18 PROCEDURE — 80053 COMPREHEN METABOLIC PANEL: CPT

## 2023-10-18 PROCEDURE — 36415 COLL VENOUS BLD VENIPUNCTURE: CPT

## 2023-10-18 RX ORDER — SENNOSIDES 8.6 MG
2 TABLET ORAL 2 TIMES DAILY
Qty: 120 TABLET | Refills: 1 | Status: SHIPPED | OUTPATIENT
Start: 2023-10-18

## 2023-10-18 ASSESSMENT — ENCOUNTER SYMPTOMS
RESPIRATORY NEGATIVE: 1
EYES NEGATIVE: 1
GASTROINTESTINAL NEGATIVE: 1

## 2023-10-18 NOTE — PATIENT INSTRUCTIONS
questions regarding your surgery or post-operative recovery should be directed to the staff at the [address] [place] [address] Gynecology Oncology at [phone], rather than your primary care physician.

## 2023-10-18 NOTE — PROGRESS NOTES
Review of Systems   Constitutional: Negative. HENT:  Negative. Eyes: Negative. Respiratory: Negative. Cardiovascular: Negative. Gastrointestinal: Negative. Endocrine: Negative. Genitourinary:  Positive for vaginal bleeding. Musculoskeletal: Negative. Skin: Negative. Neurological: Negative. Hematological: Negative.
err
gratitude for the time spent with them. TIME SUMMARY:  60 minutes F2F time  has been coded  Total time spent: *** minutes. In total, *** minutes included records review, independent interpretation of findings, ordering medications, tests or procedures, referring and communicating with other health care professionals, coordination of care and chart completion outside of the date service (DATES: *** and ***. *** minutes on each date)  In total, *** minutes included records review, obtaining history, performing the examination and / or evaluation, independent interpretation of findings, patient counseling / teaching, face to face discussion with the patient, ordering medications, tests or procedures, referring and communicating with other health care professionals, coordination of care and chart completion on the day of service (F2F START TIME: ***. F2F STOP TIME: ***. This time excludes the 30 minutes utilized to complete the procedure).       RECORDS REQUEST:    ***  Gynecologic Oncology

## 2023-10-19 ENCOUNTER — TELEPHONE (OUTPATIENT)
Dept: GYNECOLOGIC ONCOLOGY | Age: 73
End: 2023-10-19

## 2023-10-19 LAB
CYTOLOGY REPORT: NORMAL
FERRITIN SERPL-MCNC: 28 NG/ML (ref 13–150)

## 2023-10-19 NOTE — TELEPHONE ENCOUNTER
Writer contacted patient regarding the dates and time ffor surgery    Surgery date/Location   [email or web address] arrive at 7am  PAT same day  Post Op 11/10/23 @10:30am with Connie Blade in [address]    Patient and her daughter is aware to discontinue the Xarelto 72 hours prior to procedure also to stop the aspirin 5 day prior    Pstient and daughter Guillaume Quinnlly voiced understanding

## 2023-10-20 ENCOUNTER — HOSPITAL ENCOUNTER (OUTPATIENT)
Age: 73
Discharge: HOME | End: 2023-10-20
Payer: MEDICARE

## 2023-10-20 DIAGNOSIS — N93.9 VAGINAL BLEEDING: ICD-10-CM

## 2023-10-20 LAB
ALBUMIN SERPL-MCNC: 4.1 G/DL (ref 3.5–5.2)
ALBUMIN/GLOB SERPL: 1.3 {RATIO} (ref 1–2.5)
ALP SERPL-CCNC: 74 U/L (ref 35–104)
ALT SERPL-CCNC: 15 U/L (ref 5–33)
ANION GAP SERPL CALCULATED.3IONS-SCNC: 14 MMOL/L (ref 9–17)
AST SERPL-CCNC: 18 U/L
BASOPHILS # BLD: 0.06 K/UL (ref 0–0.2)
BASOPHILS NFR BLD: 1 % (ref 0–2)
BILIRUB SERPL-MCNC: 0.5 MG/DL (ref 0.3–1.2)
BUN SERPL-MCNC: 8 MG/DL (ref 8–23)
BUN/CREAT SERPL: 8 (ref 9–20)
CALCIUM SERPL-MCNC: 9.5 MG/DL (ref 8.6–10.4)
CHLORIDE SERPL-SCNC: 103 MMOL/L (ref 98–107)
CO2 SERPL-SCNC: 24 MMOL/L (ref 20–31)
CREAT SERPL-MCNC: 1 MG/DL (ref 0.5–0.9)
EOSINOPHIL # BLD: 0.1 K/UL (ref 0–0.44)
EOSINOPHILS RELATIVE PERCENT: 1 % (ref 1–4)
ERYTHROCYTE [DISTWIDTH] IN BLOOD BY AUTOMATED COUNT: 13.6 % (ref 11.8–14.4)
FERRITIN SERPL-MCNC: 27 NG/ML (ref 13–150)
GFR SERPL CREATININE-BSD FRML MDRD: 60 ML/MIN/1.73M2
GLUCOSE SERPL-MCNC: 120 MG/DL (ref 70–99)
HCT VFR BLD AUTO: 33.6 % (ref 36.3–47.1)
HGB BLD-MCNC: 10.6 G/DL (ref 11.9–15.1)
IMM GRANULOCYTES # BLD AUTO: <0.03 K/UL (ref 0–0.3)
IMM GRANULOCYTES NFR BLD: 0 %
LYMPHOCYTES NFR BLD: 1.42 K/UL (ref 1.1–3.7)
LYMPHOCYTES RELATIVE PERCENT: 19 % (ref 24–43)
MCH RBC QN AUTO: 31.3 PG (ref 25.2–33.5)
MCHC RBC AUTO-ENTMCNC: 31.5 G/DL (ref 28.4–34.8)
MCV RBC AUTO: 99.1 FL (ref 82.6–102.9)
MONOCYTES NFR BLD: 0.63 K/UL (ref 0.1–1.2)
MONOCYTES NFR BLD: 9 % (ref 3–12)
NEUTROPHILS NFR BLD: 70 % (ref 36–65)
NEUTS SEG NFR BLD: 5.21 K/UL (ref 1.5–8.1)
NRBC BLD-RTO: 0 PER 100 WBC
PLATELET # BLD AUTO: 305 K/UL (ref 138–453)
PMV BLD AUTO: 9.4 FL (ref 8.1–13.5)
POTASSIUM SERPL-SCNC: 4.3 MMOL/L (ref 3.7–5.3)
PROT SERPL-MCNC: 7.3 G/DL (ref 6.4–8.3)
RBC # BLD AUTO: 3.39 M/UL (ref 3.95–5.11)
SODIUM SERPL-SCNC: 141 MMOL/L (ref 135–144)
WBC OTHER # BLD: 7.4 K/UL (ref 3.5–11.3)

## 2023-10-20 PROCEDURE — 82728 ASSAY OF FERRITIN: CPT

## 2023-10-20 PROCEDURE — 85025 COMPLETE CBC W/AUTO DIFF WBC: CPT

## 2023-10-20 PROCEDURE — 80053 COMPREHEN METABOLIC PANEL: CPT

## 2023-10-20 PROCEDURE — 36415 COLL VENOUS BLD VENIPUNCTURE: CPT

## 2023-10-22 PROBLEM — Z23 NEED FOR SHINGLES VACCINE: Status: ACTIVE | Noted: 2023-10-22

## 2023-10-22 PROBLEM — C54.1 MALIGNANT NEOPLASM OF ENDOMETRIUM (HCC): Status: ACTIVE | Noted: 2023-10-22

## 2023-10-23 ENCOUNTER — TELEPHONE (OUTPATIENT)
Dept: GYNECOLOGIC ONCOLOGY | Age: 73
End: 2023-10-23

## 2023-10-23 NOTE — TELEPHONE ENCOUNTER
Pt called wanting to know if she can take her daily medications the day of surgery - lisinopril, metoprolol. .. pt is aware to hold xarelto 72 hours prior. Please advise pt.

## 2023-10-24 NOTE — TELEPHONE ENCOUNTER
Called and instructed patient to hold lisinopril and take metoprolol morning of surgery with small sip of water per PA. Patient also instructed to wash with antibacterial soap night before and morning of surgery. Patient voiced understanding and appreciation.

## 2023-10-25 ENCOUNTER — ANESTHESIA (OUTPATIENT)
Dept: OPERATING ROOM | Age: 73
DRG: 741 | End: 2023-10-25
Payer: MEDICARE

## 2023-10-25 ENCOUNTER — ANESTHESIA EVENT (OUTPATIENT)
Dept: OPERATING ROOM | Age: 73
DRG: 741 | End: 2023-10-25
Payer: MEDICARE

## 2023-10-25 ENCOUNTER — APPOINTMENT (OUTPATIENT)
Dept: GENERAL RADIOLOGY | Age: 73
DRG: 740 | End: 2023-10-25
Attending: OBSTETRICS & GYNECOLOGY
Payer: MEDICARE

## 2023-10-25 ENCOUNTER — HOSPITAL ENCOUNTER (INPATIENT)
Age: 73
LOS: 1 days | Discharge: HOME | DRG: 740 | End: 2023-10-26
Attending: OBSTETRICS & GYNECOLOGY | Admitting: OBSTETRICS & GYNECOLOGY
Payer: MEDICARE

## 2023-10-25 DIAGNOSIS — K59.00 CONSTIPATION, UNSPECIFIED CONSTIPATION TYPE: ICD-10-CM

## 2023-10-25 DIAGNOSIS — C55 MALIGNANT NEOPLASM OF UTERUS, UNSPECIFIED SITE (HCC): ICD-10-CM

## 2023-10-25 DIAGNOSIS — G89.18 POST-OP PAIN: Primary | ICD-10-CM

## 2023-10-25 PROBLEM — C54.9: Status: ACTIVE | Noted: 2023-10-25

## 2023-10-25 PROBLEM — R94.30 EJECTION FRACTION < 50%: Status: ACTIVE | Noted: 2023-10-25

## 2023-10-25 PROBLEM — D64.9 ANEMIA: Status: ACTIVE | Noted: 2023-10-25

## 2023-10-25 PROBLEM — Z86.73 HISTORY OF CVA (CEREBROVASCULAR ACCIDENT): Status: ACTIVE | Noted: 2023-10-25

## 2023-10-25 PROBLEM — Z90.710 S/P HYSTERECTOMY: Status: ACTIVE | Noted: 2023-10-25

## 2023-10-25 PROBLEM — I25.2 HISTORY OF MI (MYOCARDIAL INFARCTION): Status: ACTIVE | Noted: 2023-10-25

## 2023-10-25 PROBLEM — I25.10 CORONARY ARTERY DISEASE INVOLVING NATIVE CORONARY ARTERY OF NATIVE HEART WITHOUT ANGINA PECTORIS: Status: ACTIVE | Noted: 2023-10-25

## 2023-10-25 PROBLEM — N95.0 POST-MENOPAUSAL BLEEDING: Status: ACTIVE | Noted: 2023-10-25

## 2023-10-25 LAB
ABO + RH BLD: NORMAL
ARM BAND NUMBER: NORMAL
BLOOD BANK SAMPLE EXPIRATION: NORMAL
BLOOD GROUP ANTIBODIES SERPL: NEGATIVE
CANCER AG125 SERPL-ACNC: 37 U/ML
CASE NUMBER:: NORMAL
CHOLEST SERPL-MCNC: 110 MG/DL
CHOLESTEROL/HDL RATIO: 2.2
EST. AVERAGE GLUCOSE BLD GHB EST-MCNC: 126 MG/DL
HBA1C MFR BLD: 6 % (ref 4–6)
HDLC SERPL-MCNC: 50 MG/DL
LDLC SERPL CALC-MCNC: 46 MG/DL (ref 0–130)
TRIGL SERPL-MCNC: 70 MG/DL
TSH SERPL DL<=0.05 MIU/L-ACNC: 1.38 UIU/ML (ref 0.3–5)

## 2023-10-25 PROCEDURE — 0UT24ZZ RESECTION OF BILATERAL OVARIES, PERCUTANEOUS ENDOSCOPIC APPROACH: ICD-10-PCS | Performed by: OBSTETRICS & GYNECOLOGY

## 2023-10-25 PROCEDURE — 3700000001 HC ADD 15 MINUTES (ANESTHESIA): Performed by: OBSTETRICS & GYNECOLOGY

## 2023-10-25 PROCEDURE — 88307 TISSUE EXAM BY PATHOLOGIST: CPT

## 2023-10-25 PROCEDURE — 88305 TISSUE EXAM BY PATHOLOGIST: CPT

## 2023-10-25 PROCEDURE — 07BD4ZX EXCISION OF AORTIC LYMPHATIC, PERCUTANEOUS ENDOSCOPIC APPROACH, DIAGNOSTIC: ICD-10-PCS | Performed by: OBSTETRICS & GYNECOLOGY

## 2023-10-25 PROCEDURE — 6360000002 HC RX W HCPCS: Performed by: OBSTETRICS & GYNECOLOGY

## 2023-10-25 PROCEDURE — 6360000002 HC RX W HCPCS: Performed by: NURSE ANESTHETIST, CERTIFIED REGISTERED

## 2023-10-25 PROCEDURE — 8E0W4CZ ROBOTIC ASSISTED PROCEDURE OF TRUNK REGION, PERCUTANEOUS ENDOSCOPIC APPROACH: ICD-10-PCS | Performed by: OBSTETRICS & GYNECOLOGY

## 2023-10-25 PROCEDURE — C9290 INJ, BUPIVACAINE LIPOSOME: HCPCS | Performed by: OBSTETRICS & GYNECOLOGY

## 2023-10-25 PROCEDURE — 2580000003 HC RX 258

## 2023-10-25 PROCEDURE — 36415 COLL VENOUS BLD VENIPUNCTURE: CPT

## 2023-10-25 PROCEDURE — 0UT74ZZ RESECTION OF BILATERAL FALLOPIAN TUBES, PERCUTANEOUS ENDOSCOPIC APPROACH: ICD-10-PCS | Performed by: OBSTETRICS & GYNECOLOGY

## 2023-10-25 PROCEDURE — 6360000002 HC RX W HCPCS

## 2023-10-25 PROCEDURE — 86900 BLOOD TYPING SEROLOGIC ABO: CPT

## 2023-10-25 PROCEDURE — 3E1P88X IRRIGATION OF FEMALE REPRODUCTIVE USING IRRIGATING SUBSTANCE, VIA NATURAL OR ARTIFICIAL OPENING ENDOSCOPIC, DIAGNOSTIC: ICD-10-PCS | Performed by: OBSTETRICS & GYNECOLOGY

## 2023-10-25 PROCEDURE — 2500000003 HC RX 250 WO HCPCS

## 2023-10-25 PROCEDURE — 2500000003 HC RX 250 WO HCPCS: Performed by: OBSTETRICS & GYNECOLOGY

## 2023-10-25 PROCEDURE — 0UT94ZZ RESECTION OF UTERUS, PERCUTANEOUS ENDOSCOPIC APPROACH: ICD-10-PCS | Performed by: OBSTETRICS & GYNECOLOGY

## 2023-10-25 PROCEDURE — 6370000000 HC RX 637 (ALT 250 FOR IP): Performed by: STUDENT IN AN ORGANIZED HEALTH CARE EDUCATION/TRAINING PROGRAM

## 2023-10-25 PROCEDURE — 07BC4ZX EXCISION OF PELVIS LYMPHATIC, PERCUTANEOUS ENDOSCOPIC APPROACH, DIAGNOSTIC: ICD-10-PCS | Performed by: OBSTETRICS & GYNECOLOGY

## 2023-10-25 PROCEDURE — 7100000001 HC PACU RECOVERY - ADDTL 15 MIN: Performed by: OBSTETRICS & GYNECOLOGY

## 2023-10-25 PROCEDURE — 7100000000 HC PACU RECOVERY - FIRST 15 MIN: Performed by: OBSTETRICS & GYNECOLOGY

## 2023-10-25 PROCEDURE — 0DBU4ZX EXCISION OF OMENTUM, PERCUTANEOUS ENDOSCOPIC APPROACH, DIAGNOSTIC: ICD-10-PCS | Performed by: OBSTETRICS & GYNECOLOGY

## 2023-10-25 PROCEDURE — 88342 IMHCHEM/IMCYTCHM 1ST ANTB: CPT

## 2023-10-25 PROCEDURE — 83036 HEMOGLOBIN GLYCOSYLATED A1C: CPT

## 2023-10-25 PROCEDURE — 3700000000 HC ANESTHESIA ATTENDED CARE: Performed by: OBSTETRICS & GYNECOLOGY

## 2023-10-25 PROCEDURE — 2580000003 HC RX 258: Performed by: STUDENT IN AN ORGANIZED HEALTH CARE EDUCATION/TRAINING PROGRAM

## 2023-10-25 PROCEDURE — 86850 RBC ANTIBODY SCREEN: CPT

## 2023-10-25 PROCEDURE — 2580000003 HC RX 258: Performed by: OBSTETRICS & GYNECOLOGY

## 2023-10-25 PROCEDURE — 88309 TISSUE EXAM BY PATHOLOGIST: CPT

## 2023-10-25 PROCEDURE — 3600000009 HC SURGERY ROBOT BASE: Performed by: OBSTETRICS & GYNECOLOGY

## 2023-10-25 PROCEDURE — 88112 CYTOPATH CELL ENHANCE TECH: CPT

## 2023-10-25 PROCEDURE — 86304 IMMUNOASSAY TUMOR CA 125: CPT

## 2023-10-25 PROCEDURE — 71046 X-RAY EXAM CHEST 2 VIEWS: CPT

## 2023-10-25 PROCEDURE — 2709999900 HC NON-CHARGEABLE SUPPLY: Performed by: OBSTETRICS & GYNECOLOGY

## 2023-10-25 PROCEDURE — 2720000010 HC SURG SUPPLY STERILE: Performed by: OBSTETRICS & GYNECOLOGY

## 2023-10-25 PROCEDURE — S2900 ROBOTIC SURGICAL SYSTEM: HCPCS | Performed by: OBSTETRICS & GYNECOLOGY

## 2023-10-25 PROCEDURE — 80061 LIPID PANEL: CPT

## 2023-10-25 PROCEDURE — 1200000000 HC SEMI PRIVATE

## 2023-10-25 PROCEDURE — 86901 BLOOD TYPING SEROLOGIC RH(D): CPT

## 2023-10-25 PROCEDURE — 84443 ASSAY THYROID STIM HORMONE: CPT

## 2023-10-25 PROCEDURE — 2500000003 HC RX 250 WO HCPCS: Performed by: NURSE ANESTHETIST, CERTIFIED REGISTERED

## 2023-10-25 PROCEDURE — 88341 IMHCHEM/IMCYTCHM EA ADD ANTB: CPT

## 2023-10-25 PROCEDURE — 3600000019 HC SURGERY ROBOT ADDTL 15MIN: Performed by: OBSTETRICS & GYNECOLOGY

## 2023-10-25 RX ORDER — INDOCYANINE GREEN AND WATER 25 MG
KIT INJECTION PRN
Status: DISCONTINUED | OUTPATIENT
Start: 2023-10-25 | End: 2023-10-25 | Stop reason: HOSPADM

## 2023-10-25 RX ORDER — ATORVASTATIN CALCIUM 80 MG/1
80 TABLET, FILM COATED ORAL NIGHTLY
Status: DISCONTINUED | OUTPATIENT
Start: 2023-10-26 | End: 2023-10-26 | Stop reason: HOSPADM

## 2023-10-25 RX ORDER — UREA 10 %
3 LOTION (ML) TOPICAL NIGHTLY PRN
Status: DISCONTINUED | OUTPATIENT
Start: 2023-10-25 | End: 2023-10-26 | Stop reason: HOSPADM

## 2023-10-25 RX ORDER — GABAPENTIN 100 MG/1
100 CAPSULE ORAL 3 TIMES DAILY PRN
Status: DISCONTINUED | OUTPATIENT
Start: 2023-10-25 | End: 2023-10-26 | Stop reason: HOSPADM

## 2023-10-25 RX ORDER — LIDOCAINE HYDROCHLORIDE 10 MG/ML
INJECTION, SOLUTION EPIDURAL; INFILTRATION; INTRACAUDAL; PERINEURAL PRN
Status: DISCONTINUED | OUTPATIENT
Start: 2023-10-25 | End: 2023-10-25 | Stop reason: SDUPTHER

## 2023-10-25 RX ORDER — SENNOSIDES 8.6 MG
2 TABLET ORAL 2 TIMES DAILY
Qty: 120 TABLET | Refills: 1 | Status: SHIPPED | OUTPATIENT
Start: 2023-10-25

## 2023-10-25 RX ORDER — FENTANYL CITRATE 50 UG/ML
INJECTION, SOLUTION INTRAMUSCULAR; INTRAVENOUS PRN
Status: DISCONTINUED | OUTPATIENT
Start: 2023-10-25 | End: 2023-10-25 | Stop reason: SDUPTHER

## 2023-10-25 RX ORDER — ROCURONIUM BROMIDE 10 MG/ML
INJECTION, SOLUTION INTRAVENOUS PRN
Status: DISCONTINUED | OUTPATIENT
Start: 2023-10-25 | End: 2023-10-25 | Stop reason: SDUPTHER

## 2023-10-25 RX ORDER — SENNOSIDES A AND B 8.6 MG/1
2 TABLET, FILM COATED ORAL 2 TIMES DAILY
Status: DISCONTINUED | OUTPATIENT
Start: 2023-10-25 | End: 2023-10-26 | Stop reason: HOSPADM

## 2023-10-25 RX ORDER — OXYCODONE HYDROCHLORIDE 5 MG/1
10 TABLET ORAL EVERY 4 HOURS PRN
Status: DISCONTINUED | OUTPATIENT
Start: 2023-10-25 | End: 2023-10-26 | Stop reason: HOSPADM

## 2023-10-25 RX ORDER — MAGNESIUM HYDROXIDE 1200 MG/15ML
LIQUID ORAL CONTINUOUS PRN
Status: DISCONTINUED | OUTPATIENT
Start: 2023-10-25 | End: 2023-10-25 | Stop reason: HOSPADM

## 2023-10-25 RX ORDER — ONDANSETRON 2 MG/ML
INJECTION INTRAMUSCULAR; INTRAVENOUS PRN
Status: DISCONTINUED | OUTPATIENT
Start: 2023-10-25 | End: 2023-10-25 | Stop reason: SDUPTHER

## 2023-10-25 RX ORDER — SODIUM CHLORIDE 9 MG/ML
INJECTION, SOLUTION INTRAVENOUS PRN
Status: DISCONTINUED | OUTPATIENT
Start: 2023-10-25 | End: 2023-10-25 | Stop reason: HOSPADM

## 2023-10-25 RX ORDER — LISINOPRIL 20 MG/1
40 TABLET ORAL 2 TIMES DAILY
Status: DISCONTINUED | OUTPATIENT
Start: 2023-10-26 | End: 2023-10-26 | Stop reason: HOSPADM

## 2023-10-25 RX ORDER — MIDAZOLAM HYDROCHLORIDE 1 MG/ML
INJECTION INTRAMUSCULAR; INTRAVENOUS PRN
Status: DISCONTINUED | OUTPATIENT
Start: 2023-10-25 | End: 2023-10-25 | Stop reason: SDUPTHER

## 2023-10-25 RX ORDER — SODIUM CHLORIDE 9 MG/ML
INJECTION, SOLUTION INTRAVENOUS CONTINUOUS
Status: DISCONTINUED | OUTPATIENT
Start: 2023-10-25 | End: 2023-10-26

## 2023-10-25 RX ORDER — OXYCODONE HYDROCHLORIDE 5 MG/1
5 TABLET ORAL EVERY 6 HOURS PRN
Qty: 12 TABLET | Refills: 0 | Status: SHIPPED | OUTPATIENT
Start: 2023-10-25 | End: 2023-10-28

## 2023-10-25 RX ORDER — FAMOTIDINE 20 MG/1
20 TABLET, FILM COATED ORAL 2 TIMES DAILY
Status: DISCONTINUED | OUTPATIENT
Start: 2023-10-25 | End: 2023-10-26 | Stop reason: HOSPADM

## 2023-10-25 RX ORDER — VITAMIN B COMPLEX
1000 TABLET ORAL DAILY
Status: DISCONTINUED | OUTPATIENT
Start: 2023-10-26 | End: 2023-10-26 | Stop reason: HOSPADM

## 2023-10-25 RX ORDER — SIMETHICONE 80 MG
80 TABLET,CHEWABLE ORAL 4 TIMES DAILY PRN
Qty: 90 TABLET | Refills: 0 | Status: SHIPPED | OUTPATIENT
Start: 2023-10-25

## 2023-10-25 RX ORDER — SODIUM CHLORIDE, SODIUM LACTATE, POTASSIUM CHLORIDE, CALCIUM CHLORIDE 600; 310; 30; 20 MG/100ML; MG/100ML; MG/100ML; MG/100ML
INJECTION, SOLUTION INTRAVENOUS CONTINUOUS PRN
Status: DISCONTINUED | OUTPATIENT
Start: 2023-10-25 | End: 2023-10-25 | Stop reason: SDUPTHER

## 2023-10-25 RX ORDER — ACETAMINOPHEN 500 MG
1000 TABLET ORAL EVERY 6 HOURS SCHEDULED
Status: DISCONTINUED | OUTPATIENT
Start: 2023-10-25 | End: 2023-10-26 | Stop reason: HOSPADM

## 2023-10-25 RX ORDER — HYDRALAZINE HYDROCHLORIDE 20 MG/ML
10 INJECTION INTRAMUSCULAR; INTRAVENOUS
Status: DISCONTINUED | OUTPATIENT
Start: 2023-10-25 | End: 2023-10-25 | Stop reason: HOSPADM

## 2023-10-25 RX ORDER — SODIUM CHLORIDE 0.9 % (FLUSH) 0.9 %
5-40 SYRINGE (ML) INJECTION EVERY 12 HOURS SCHEDULED
Status: DISCONTINUED | OUTPATIENT
Start: 2023-10-25 | End: 2023-10-26 | Stop reason: HOSPADM

## 2023-10-25 RX ORDER — SODIUM CHLORIDE 0.9 % (FLUSH) 0.9 %
5-40 SYRINGE (ML) INJECTION PRN
Status: DISCONTINUED | OUTPATIENT
Start: 2023-10-25 | End: 2023-10-26 | Stop reason: HOSPADM

## 2023-10-25 RX ORDER — BUPIVACAINE HYDROCHLORIDE AND EPINEPHRINE 2.5; 5 MG/ML; UG/ML
INJECTION, SOLUTION EPIDURAL; INFILTRATION; INTRACAUDAL; PERINEURAL PRN
Status: DISCONTINUED | OUTPATIENT
Start: 2023-10-25 | End: 2023-10-25 | Stop reason: HOSPADM

## 2023-10-25 RX ORDER — PROPOFOL 10 MG/ML
INJECTION, EMULSION INTRAVENOUS PRN
Status: DISCONTINUED | OUTPATIENT
Start: 2023-10-25 | End: 2023-10-25 | Stop reason: SDUPTHER

## 2023-10-25 RX ORDER — ACETAMINOPHEN 500 MG
1000 TABLET ORAL 3 TIMES DAILY
Qty: 60 TABLET | Refills: 1 | Status: SHIPPED | OUTPATIENT
Start: 2023-10-25

## 2023-10-25 RX ORDER — SODIUM CHLORIDE 0.9 % (FLUSH) 0.9 %
5-40 SYRINGE (ML) INJECTION EVERY 12 HOURS SCHEDULED
Status: DISCONTINUED | OUTPATIENT
Start: 2023-10-25 | End: 2023-10-25 | Stop reason: HOSPADM

## 2023-10-25 RX ORDER — ENOXAPARIN SODIUM 100 MG/ML
40 INJECTION SUBCUTANEOUS ONCE
Status: COMPLETED | OUTPATIENT
Start: 2023-10-25 | End: 2023-10-25

## 2023-10-25 RX ORDER — SODIUM CHLORIDE 9 MG/ML
INJECTION, SOLUTION INTRAVENOUS PRN
Status: DISCONTINUED | OUTPATIENT
Start: 2023-10-25 | End: 2023-10-26 | Stop reason: HOSPADM

## 2023-10-25 RX ORDER — SODIUM CHLORIDE 0.9 % (FLUSH) 0.9 %
5-40 SYRINGE (ML) INJECTION PRN
Status: DISCONTINUED | OUTPATIENT
Start: 2023-10-25 | End: 2023-10-25 | Stop reason: HOSPADM

## 2023-10-25 RX ORDER — METAPROTERENOL SULFATE 10 MG
500 TABLET ORAL DAILY
Status: DISCONTINUED | OUTPATIENT
Start: 2023-10-26 | End: 2023-10-25 | Stop reason: RX

## 2023-10-25 RX ORDER — GABAPENTIN 100 MG/1
100 CAPSULE ORAL 3 TIMES DAILY
Qty: 60 CAPSULE | Refills: 0 | Status: SHIPPED | OUTPATIENT
Start: 2023-10-25 | End: 2023-11-14

## 2023-10-25 RX ORDER — DIPHENHYDRAMINE HCL 25 MG
25 TABLET ORAL EVERY 6 HOURS PRN
Status: DISCONTINUED | OUTPATIENT
Start: 2023-10-25 | End: 2023-10-26 | Stop reason: HOSPADM

## 2023-10-25 RX ORDER — PHENYLEPHRINE HCL IN 0.9% NACL 1 MG/10 ML
SYRINGE (ML) INTRAVENOUS PRN
Status: DISCONTINUED | OUTPATIENT
Start: 2023-10-25 | End: 2023-10-25 | Stop reason: SDUPTHER

## 2023-10-25 RX ORDER — OXYCODONE HYDROCHLORIDE 5 MG/1
5 TABLET ORAL EVERY 4 HOURS PRN
Status: DISCONTINUED | OUTPATIENT
Start: 2023-10-25 | End: 2023-10-26 | Stop reason: HOSPADM

## 2023-10-25 RX ORDER — ONDANSETRON 2 MG/ML
4 INJECTION INTRAMUSCULAR; INTRAVENOUS
Status: DISCONTINUED | OUTPATIENT
Start: 2023-10-25 | End: 2023-10-25 | Stop reason: HOSPADM

## 2023-10-25 RX ORDER — DEXAMETHASONE SODIUM PHOSPHATE 10 MG/ML
INJECTION INTRAMUSCULAR; INTRAVENOUS PRN
Status: DISCONTINUED | OUTPATIENT
Start: 2023-10-25 | End: 2023-10-25 | Stop reason: SDUPTHER

## 2023-10-25 RX ADMIN — FAMOTIDINE 20 MG: 20 TABLET, FILM COATED ORAL at 20:46

## 2023-10-25 RX ADMIN — ROCURONIUM BROMIDE 20 MG: 10 INJECTION, SOLUTION INTRAVENOUS at 13:00

## 2023-10-25 RX ADMIN — ROCURONIUM BROMIDE 50 MG: 10 INJECTION, SOLUTION INTRAVENOUS at 09:42

## 2023-10-25 RX ADMIN — FENTANYL CITRATE 100 MCG: 0.05 INJECTION, SOLUTION INTRAMUSCULAR; INTRAVENOUS at 09:42

## 2023-10-25 RX ADMIN — ONDANSETRON 4 MG: 2 INJECTION INTRAMUSCULAR; INTRAVENOUS at 15:11

## 2023-10-25 RX ADMIN — ROCURONIUM BROMIDE 20 MG: 10 INJECTION, SOLUTION INTRAVENOUS at 11:35

## 2023-10-25 RX ADMIN — SUGAMMADEX 150 MG: 100 INJECTION, SOLUTION INTRAVENOUS at 15:21

## 2023-10-25 RX ADMIN — SODIUM CHLORIDE: 9 INJECTION, SOLUTION INTRAVENOUS at 20:13

## 2023-10-25 RX ADMIN — Medication 100 MCG: at 10:05

## 2023-10-25 RX ADMIN — MIDAZOLAM 2 MG: 1 INJECTION INTRAMUSCULAR; INTRAVENOUS at 09:42

## 2023-10-25 RX ADMIN — ROCURONIUM BROMIDE 20 MG: 10 INJECTION, SOLUTION INTRAVENOUS at 14:00

## 2023-10-25 RX ADMIN — SENNOSIDES 17.2 MG: 8.6 TABLET, COATED ORAL at 20:46

## 2023-10-25 RX ADMIN — SODIUM CHLORIDE, POTASSIUM CHLORIDE, SODIUM LACTATE AND CALCIUM CHLORIDE: 600; 310; 30; 20 INJECTION, SOLUTION INTRAVENOUS at 15:05

## 2023-10-25 RX ADMIN — ROCURONIUM BROMIDE 20 MG: 10 INJECTION, SOLUTION INTRAVENOUS at 10:30

## 2023-10-25 RX ADMIN — SODIUM CHLORIDE, SODIUM LACTATE, POTASSIUM CHLORIDE, CALCIUM CHLORIDE: 600; 310; 30; 20 INJECTION, SOLUTION INTRAVENOUS at 09:44

## 2023-10-25 RX ADMIN — LIDOCAINE HYDROCHLORIDE 50 MG: 10 INJECTION, SOLUTION EPIDURAL; INFILTRATION; INTRACAUDAL; PERINEURAL at 09:42

## 2023-10-25 RX ADMIN — SODIUM CHLORIDE, PRESERVATIVE FREE 10 ML: 5 INJECTION INTRAVENOUS at 20:14

## 2023-10-25 RX ADMIN — Medication 2 G: at 14:11

## 2023-10-25 RX ADMIN — ROCURONIUM BROMIDE 10 MG: 10 INJECTION, SOLUTION INTRAVENOUS at 12:15

## 2023-10-25 RX ADMIN — FENTANYL CITRATE 50 MCG: 0.05 INJECTION, SOLUTION INTRAMUSCULAR; INTRAVENOUS at 10:17

## 2023-10-25 RX ADMIN — FENTANYL CITRATE 50 MCG: 0.05 INJECTION, SOLUTION INTRAMUSCULAR; INTRAVENOUS at 10:25

## 2023-10-25 RX ADMIN — FENTANYL CITRATE 50 MCG: 0.05 INJECTION, SOLUTION INTRAMUSCULAR; INTRAVENOUS at 12:44

## 2023-10-25 RX ADMIN — ACETAMINOPHEN 1000 MG: 500 TABLET ORAL at 20:47

## 2023-10-25 RX ADMIN — GABAPENTIN 100 MG: 100 CAPSULE ORAL at 20:47

## 2023-10-25 RX ADMIN — ENOXAPARIN SODIUM 40 MG: 100 INJECTION SUBCUTANEOUS at 08:18

## 2023-10-25 RX ADMIN — Medication 100 MCG: at 09:55

## 2023-10-25 RX ADMIN — DEXAMETHASONE SODIUM PHOSPHATE 10 MG: 10 INJECTION INTRAMUSCULAR; INTRAVENOUS at 09:46

## 2023-10-25 RX ADMIN — Medication 2 G: at 10:14

## 2023-10-25 RX ADMIN — SODIUM CHLORIDE, POTASSIUM CHLORIDE, SODIUM LACTATE AND CALCIUM CHLORIDE: 600; 310; 30; 20 INJECTION, SOLUTION INTRAVENOUS at 09:05

## 2023-10-25 RX ADMIN — PROPOFOL 150 MG: 10 INJECTION, EMULSION INTRAVENOUS at 09:42

## 2023-10-25 ASSESSMENT — PAIN DESCRIPTION - LOCATION
LOCATION: ABDOMEN

## 2023-10-25 ASSESSMENT — PAIN DESCRIPTION - FREQUENCY: FREQUENCY: CONTINUOUS

## 2023-10-25 ASSESSMENT — PAIN SCALES - GENERAL
PAINLEVEL_OUTOF10: 4
PAINLEVEL_OUTOF10: 6

## 2023-10-25 ASSESSMENT — PAIN DESCRIPTION - PAIN TYPE
TYPE: ACUTE PAIN;SURGICAL PAIN
TYPE: SURGICAL PAIN

## 2023-10-25 ASSESSMENT — PAIN DESCRIPTION - DESCRIPTORS
DESCRIPTORS: DISCOMFORT;PRESSURE
DESCRIPTORS: DULL
DESCRIPTORS: DISCOMFORT;PRESSURE

## 2023-10-25 ASSESSMENT — PAIN DESCRIPTION - ONSET: ONSET: ON-GOING

## 2023-10-25 ASSESSMENT — PAIN DESCRIPTION - ORIENTATION: ORIENTATION: LOWER

## 2023-10-25 ASSESSMENT — PAIN - FUNCTIONAL ASSESSMENT: PAIN_FUNCTIONAL_ASSESSMENT: 0-10

## 2023-10-25 NOTE — BRIEF OP NOTE
participated in the entire case. I agree with the above documentation.     Heidi Zarco MD  Gynecologic Oncology

## 2023-10-25 NOTE — OP NOTE
OPERATIVE NOTE  PATIENT: Seda Malcolm  YOB: 1950  MRN: [id number]  DATE OF PROCEDURE: 10/25/2023     PRE-OP DIAGNOSIS:   Serous carcinoma of the UTERUS  Post-menopausal bleeding  History of CVA  Long term anti-coagulation  Coronary artery disease  History of myocardial infarction  Compromised ejection fraction (EF 45%)     POST-OP DIAGNOSIS:   Serous carcinoma of the UTERUS  Post-menopausal bleeding  History of CVA  Long term anti-coagulation  Coronary artery disease  History of myocardial infarction  Compromised ejection fraction (EF 45%)       PROCEDURE:  Robotic assisted hysterectomy + Bilateral salpingo-oophorectomy + Intracervical Indocyanine Dye  Injection + Belk lymph node mapping + Bilateral pelvic lymph node dissection, Right Para-Aortic Lymph Node + Omental Biopsy     SURGEON: Merna Griffin MD     ASSISTANT:   Physician Assistant: Jonathan Sanchez PA-C  Resident: Kirk Dunlap DO     ANESTHESIA: General     ESTIMATED BLOOD LOSS (mL): 72BZ     COMPLICATIONS: None noted at the end of the procedure     SPECIMENS:   ID Type Source Tests Collected by Time   A : PELVIC WASHING  Body Fluid Pelvic Washings CYTOLOGY, NON-GYN Florentino Allred MD 10/25/2023 1037   B : RIGHT PARA  AORTIC SENTINEL LYMPH NODES FOR ULTRA STAGING Tissue Pelvic Washings SURGICAL PATHOLOGY José Luis Tsang MD 10/25/2023 1139   C : Right LOWER aortic & right common iliac sentinel lymph nodes for ultra staging Tissue Lymph Node SURGICAL PATHOLOGY Florentino Allred MD 10/25/2023 1208   D : UTERUS, CERVIX, BILATERAL FALLOPIAN TUBES AND OVARIES Tissue Uterus SURGICAL PATHOLOGY Florentino Allred MD 10/25/2023 1415   E : RIGHT OBTURATOR LYMPH NODES Tissue Lymph Node SURGICAL PATHOLOGY Florentino Allred MD 10/25/2023 1241   F : RIGHT EXTERNAL ILIAC LYMPH NODES Tissue Lymph Node SURGICAL PATHOLOGY José Luis Tsang MD 10/25/2023 1242   G : LEFT PELVIC LYMPH NODES Tissue Lymph Node SURGICAL PATHOLOGY José Luis Tsang MD

## 2023-10-26 VITALS
HEART RATE: 90 BPM | WEIGHT: 180.12 LBS | RESPIRATION RATE: 16 BRPM | BODY MASS INDEX: 30.01 KG/M2 | TEMPERATURE: 98.5 F | DIASTOLIC BLOOD PRESSURE: 56 MMHG | OXYGEN SATURATION: 96 % | SYSTOLIC BLOOD PRESSURE: 96 MMHG | HEIGHT: 65 IN

## 2023-10-26 PROCEDURE — 97530 THERAPEUTIC ACTIVITIES: CPT

## 2023-10-26 PROCEDURE — 97162 PT EVAL MOD COMPLEX 30 MIN: CPT

## 2023-10-26 PROCEDURE — 97116 GAIT TRAINING THERAPY: CPT

## 2023-10-26 PROCEDURE — 97110 THERAPEUTIC EXERCISES: CPT

## 2023-10-26 PROCEDURE — 6370000000 HC RX 637 (ALT 250 FOR IP): Performed by: STUDENT IN AN ORGANIZED HEALTH CARE EDUCATION/TRAINING PROGRAM

## 2023-10-26 PROCEDURE — 2580000003 HC RX 258: Performed by: STUDENT IN AN ORGANIZED HEALTH CARE EDUCATION/TRAINING PROGRAM

## 2023-10-26 RX ADMIN — FAMOTIDINE 20 MG: 20 TABLET, FILM COATED ORAL at 08:34

## 2023-10-26 RX ADMIN — OXYCODONE HYDROCHLORIDE 5 MG: 5 TABLET ORAL at 14:44

## 2023-10-26 RX ADMIN — SENNOSIDES 17.2 MG: 8.6 TABLET, COATED ORAL at 08:34

## 2023-10-26 RX ADMIN — SODIUM CHLORIDE: 9 INJECTION, SOLUTION INTRAVENOUS at 04:14

## 2023-10-26 RX ADMIN — RIVAROXABAN 2.5 MG: 2.5 TABLET, FILM COATED ORAL at 10:32

## 2023-10-26 RX ADMIN — SODIUM CHLORIDE, PRESERVATIVE FREE 10 ML: 5 INJECTION INTRAVENOUS at 08:35

## 2023-10-26 RX ADMIN — METOPROLOL TARTRATE 25 MG: 25 TABLET ORAL at 08:34

## 2023-10-26 RX ADMIN — ACETAMINOPHEN 1000 MG: 500 TABLET ORAL at 06:17

## 2023-10-26 RX ADMIN — Medication 1000 UNITS: at 08:35

## 2023-10-26 RX ADMIN — ACETAMINOPHEN 1000 MG: 500 TABLET ORAL at 00:50

## 2023-10-26 ASSESSMENT — PAIN DESCRIPTION - LOCATION
LOCATION: ABDOMEN

## 2023-10-26 ASSESSMENT — PAIN SCALES - GENERAL
PAINLEVEL_OUTOF10: 3
PAINLEVEL_OUTOF10: 3
PAINLEVEL_OUTOF10: 4
PAINLEVEL_OUTOF10: 1
PAINLEVEL_OUTOF10: 1

## 2023-10-26 ASSESSMENT — PAIN DESCRIPTION - DESCRIPTORS
DESCRIPTORS: TENDER;DISCOMFORT
DESCRIPTORS: DISCOMFORT
DESCRIPTORS: DISCOMFORT;TENDER
DESCRIPTORS: DISCOMFORT

## 2023-10-26 ASSESSMENT — PAIN DESCRIPTION - ORIENTATION: ORIENTATION: LOWER

## 2023-10-26 ASSESSMENT — PAIN DESCRIPTION - PAIN TYPE: TYPE: ACUTE PAIN;SURGICAL PAIN

## 2023-10-26 NOTE — PLAN OF CARE
Problem: Discharge Planning  Goal: Discharge to home or other facility with appropriate resources  10/26/2023 1515 by Kaylynn Amor RN  Outcome: Completed  10/26/2023 0621 by Krista Garcia RN  Outcome: Dennys Gomez (Taken 10/25/2023 2151)  Discharge to home or other facility with appropriate resources:   Identify barriers to discharge with patient and caregiver   Identify discharge learning needs (meds, wound care, etc)   Refer to discharge planning if patient needs post-hospital services based on physician order or complex needs related to functional status, cognitive ability or social support system   Arrange for needed discharge resources and transportation as appropriate     Problem: Pain  Goal: Verbalizes/displays adequate comfort level or baseline comfort level  10/26/2023 1515 by Kaylynn Amor RN  Outcome: Completed  10/26/2023 0621 by Krista Garcia RN  Outcome: Progressing     Problem: Safety - Adult  Goal: Free from fall injury  10/26/2023 1515 by Kaylynn Amor RN  Outcome: Completed  10/26/2023 0621 by Krista Garcia RN  Outcome: Progressing     Problem: Chronic Conditions and Co-morbidities  Goal: Patient's chronic conditions and co-morbidity symptoms are monitored and maintained or improved  10/26/2023 1515 by Kaylynn Amor RN  Outcome: Completed  10/26/2023 0621 by Krista Garcia RN  Outcome: Progressing     Problem: ABCDS Injury Assessment  Goal: Absence of physical injury  10/26/2023 1515 by Kaylynn Amor RN  Outcome: Completed  10/26/2023 0621 by Krista Garcia RN  Outcome: Progressing

## 2023-10-26 NOTE — CARE COORDINATION
Case Management Assessment  Initial Evaluation    Date/Time of Evaluation: 10/26/2023 9:36 AM  Assessment Completed by: Myrtle Mijares RN    If patient is discharged prior to next notation, then this note serves as note for discharge by case management. Patient Name: Aramis Hayward                   YOB: 1950  Diagnosis: Malignant neoplasm of uterus, unspecified site [facility]) Alanna Dozier  S/P hysterectomy [Z90.710]                   Date / Time: 10/25/2023  7:03 AM    Patient Admission Status: Inpatient   Readmission Risk (Low < 19, Mod (19-27), High > 27): Readmission Risk Score: 6.9    Current PCP: Eileen Freeman, DO  PCP verified by CM? Yes    Chart Reviewed: Yes      History Provided by: Patient  Patient Orientation: Alert and Oriented    Patient Cognition: Alert    Hospitalization in the last 30 days (Readmission):  No    If yes, Readmission Assessment in CM Navigator will be completed. Advance Directives:      Code Status: Full Code   Patient's Primary Decision Maker is: Legal Next of Kin (states has POA, nothing on file)      Discharge Planning:    Patient lives with: Children Type of Home: House  Primary Care Giver: Self  Patient Support Systems include: Children (son)   Current Financial resources: Medicare  Current community resources:    Current services prior to admission: None            Current DME:              Type of Home Care services:  None    ADLS  Prior functional level: Independent in ADLs/IADLs  Current functional level: Independent in ADLs/IADLs    PT AM-PAC:   /24  OT AM-PAC:   /24    Family can provide assistance at DC: Yes  Would you like Case Management to discuss the discharge plan with any other family members/significant others, and if so, who?     Plans to Return to Present Housing: Yes  Other Identified Issues/Barriers to RETURNING to current housing: pain management/IV Pepcid  Potential Assistance needed at discharge: N/A            Potential DME:    Patient expects to discharge to: [address] [facility] for transportation at discharge:      Financial    Payor: Johan Heady / Plan: Madalyn Nargis PPO / Product Type: Medicare /     Potential assistance Purchasing Medications: No  Meds-to-Beds request:        [address] [place] [address], [address] [place] [address] [phone] [facility] [phone]  [address]  [place] [address]  Phone: [phone] Fax: [phone]      Notes:    Factors facilitating achievement of predicted outcomes: Motivated, Cooperative, and Pleasant    Barriers to discharge: Medication managment    Additional Case Management Notes: Plan is to return home independent with son who will transport. Currently on IV Pepcid. The Plan for Transition of Care is related to the following treatment goals of Malignant neoplasm of uterus, unspecified site [facility]) [C55]  S/P hysterectomy [I22.174]    IF APPLICABLE: The Patient and/or patient representative Adeola Fuentes and her family were provided with a choice of provider and agrees with the discharge plan. Freedom of choice list with basic dialogue that supports the patient's individualized plan of care/goals and shares the quality data associated with the providers was provided to:     Patient Representative Name:       The Patient and/or Patient Representative Agree with the Discharge Plan?   Yes    Marvin Wilson RN  Case Management Department  Ph:[phone]

## 2023-10-26 NOTE — PROGRESS NOTES
Independent  Transfer Assistance: Independent  Active : Yes  Mode of Transportation: Car  Occupation: Retired  Type of Occupation: factory work  Leisure & Hobbies: watching TV  Additional Comments: Independent at baseline with self care and mobilty  Vision/Hearing  Vision  Vision: Within Functional Limits  Hearing  Hearing: Within functional limits    Cognition   Orientation  Overall Orientation Status: Within Normal Limits  Orientation Level: Oriented X4  Cognition  Overall Cognitive Status: WNL     Objective   Pulse: 90  Heart Rate Source: Monitor  BP: (!) 96/56 (RN aware)  BP Location: Left upper arm  BP Method: Automatic  Patient Position: Sitting  MAP (Calculated): 69  Respirations: 16  SpO2: 96 %  O2 Device: None (Room air)     Observation/Palpation  Posture: Good  Gross Assessment  AROM: Within functional limits  PROM: Within functional limits  Strength: Within functional limits  Coordination: Within functional limits  Tone: Normal  Sensation: Intact     AROM RLE (degrees)  RLE AROM: WNL  AROM LLE (degrees)  LLE AROM : WNL  AROM RUE (degrees)  RUE AROM : WNL  AROM LUE (degrees)  LUE AROM : WNL  Strength RLE  Strength RLE: WFL  Strength LLE  Strength LLE: WFL  Strength RUE  Strength RUE: WFL  Strength LUE  Strength LUE: WFL  Strength Other  Other: gereral weakness and deconditioning           Bed mobility  Bed Mobility Comments: Pt up to chair upon arrival voice no concerns with abiltiy to get into or out of bed  Transfers  Sit to Stand: Supervision  Stand to Sit: Supervision  Bed to Chair: Supervision  Comment: Pt educated on safety with use of Rw with focus on hand and foot placement, demonstrate good understanding and safety with use.   Ambulation  Surface: Level tile  Device: Rolling Walker  Assistance: Supervision  Gait Deviations: Slow Reshma;None;Decreased step height  Distance: 150 ft RW SBA  Comments: Pt required 1 standing rest break due to fatigue  Stairs/Curb  Stairs?: Yes (step to pattern None      Therapy Time   Individual Concurrent Group Co-treatment   Time In 1305         Time Out 1401         Minutes 56         Timed Code Treatment Minutes: 147 . [facility]       John Kilgore, PT, DPT

## 2023-10-26 NOTE — PLAN OF CARE
Problem: Discharge Planning  Goal: Discharge to home or other facility with appropriate resources  Outcome: Progressing  Flowsheets (Taken 10/25/2023 2151)  Discharge to home or other facility with appropriate resources:   Identify barriers to discharge with patient and caregiver   Identify discharge learning needs (meds, wound care, etc)   Refer to discharge planning if patient needs post-hospital services based on physician order or complex needs related to functional status, cognitive ability or social support system   Arrange for needed discharge resources and transportation as appropriate     Problem: Pain  Goal: Verbalizes/displays adequate comfort level or baseline comfort level  Outcome: Progressing     Problem: Safety - Adult  Goal: Free from fall injury  Outcome: Progressing     Problem: Chronic Conditions and Co-morbidities  Goal: Patient's chronic conditions and co-morbidity symptoms are monitored and maintained or improved  Outcome: Progressing     Problem: ABCDS Injury Assessment  Goal: Absence of physical injury  Outcome: Progressing

## 2023-10-26 NOTE — DISCHARGE SUMMARY
Gyn Discharge Summary  [facility]      Patient Name: Charla Doran  Patient : 1950  Primary Care Physician: Sanchez Bowie DO  Admit Date: 10/25/2023    Principal Diagnosis: Serous Carcinoma of the Uterus    Other Diagnosis:   Malignant neoplasm of uterus, unspecified site ([address]) [C55]  S/P hysterectomy [Z90.710]  Patient Active Problem List   Diagnosis    Cerebrovascular accident (CVA) ([address])    Expressive aphasia    TIA (transient ischemic attack)    Malignant neoplasm of endometrium ([address])    Influenza vaccine needed    Serous carcinoma of body of uterus ([address])    Ejection fraction < 50%    History of MI (myocardial infarction)    Coronary artery disease involving native coronary artery of native heart without angina pectoris    History of CVA (cerebrovascular accident)    Anemia    Post-menopausal bleeding    S/P hysterectomy       Infection: No  Hospital Acquired: N.A    Surgical Operations & Procedures: Robotic assisted hysterectomy, Bilateral salpingo-oophorectomy, Intracervical Indocyanine Dye Injection, [place] lymph node mapping, Bilateral pelvic lymph node dissection, Right Para-Aortic Lymph Node, Omental Biopsy     Consultations: Anesthesia    Pertinent Findings & Procedures:   Charla Doran is a 68 y.o. female admitted for surgical management; received Ancef x2 intra-op. She underwent Robotic assisted hysterectomy, Bilateral salpingo-oophorectomy, Intracervical Indocyanine Dye Injection, [place] lymph node mapping, Bilateral pelvic lymph node dissection, Right Para-Aortic Lymph Node, Omental Biopsy on 10/25/23. Post-op course normal, discharged home on POD#1. Follow up in 11/10/23. Discharge instructions reviewed and questions answered.     Course of patient: normal    Discharge to: Home    Readmission planned: No    Recommendations on Discharge:     Medications:     Medication List        START taking these medications      acetaminophen 500 MG tablet  Commonly known

## 2023-10-27 ENCOUNTER — TELEPHONE (OUTPATIENT)
Dept: GYNECOLOGIC ONCOLOGY | Age: 73
End: 2023-10-27

## 2023-10-27 LAB — SURGICAL PATHOLOGY REPORT: NORMAL

## 2023-10-27 NOTE — TELEPHONE ENCOUNTER
Called patient to check on post operative state  She reports to be doing well   Reports her pain is controlled with medications  She achieved a bowel movement this morning, soft and formed  Normal urination habits  No vaginal bleeding   She is taking her Xarelto as prescribed   Tolerating oral intake with no nausea or vomiting  Ambulating around her home with no concerns    Reviewed post operative appointment on 11/10 at [facility]  She will call office with any other questions or concerns in the interim   Patient voiced understanding and appreciation

## 2023-11-01 LAB — SURGICAL PATHOLOGY REPORT: NORMAL

## 2023-11-02 NOTE — PROGRESS NOTES
Physician Progress Note      PATIENT:               Varsha Crowell  CSN #:                  [id number]  :                       1950  ADMIT DATE:       10/25/2023 7:03 AM  [address] [facility] DATE:        10/26/2023 3:24 PM  RESPONDING  PROVIDER #:        Jean Pierre Ruth DO          QUERY TEXT:    Pt admitted with Malignant neoplasm of endometrium and underwent lymph node   dissection, noted to have surgical pathology consistent with lymph node   metastasis. If possible, please document in progress notes and d/c summary a   correlating diagnosis to explain pathology findings: The medical record reflects the following:  Risk Factors: Malignant neoplasm of endometrium  Clinical Indicators: per pathology report \"Lymph nodes, right obturator,   excision: Metastatic carcinoma, one of one lymph nodes, Lymph nodes, right   external iliac, excision: Metastatic carcinoma, one of two lymph nodes, Lymph   nodes, left pelvic, excision: Metastatic carcinoma, one of five lymph nodes  Treatment: excision of external iliac, pelvic, and obturator lymph nodes    Thank you, Laura Garcia, [address] [facility]  [email or web address]  office hours [phone]  Options provided:  -- Secondary malignant neoplasm of intrapelvic lymph nodes  -- Other - I will add my own diagnosis  -- Disagree - Not applicable / Not valid  -- Disagree - Clinically unable to determine / Unknown  -- Refer to Clinical Documentation Reviewer    PROVIDER RESPONSE TEXT:    Patient admitted for surgical management of Uterine Serous Adenocarcinoma. Post-op pathology showed lymph node metastasis.     Query created by: Semaj Meza on 2023 1:31 PM      Electronically signed by:  Jean Pierre Ruth DO 2023 1:40 PM

## 2023-11-10 ENCOUNTER — TELEPHONE (OUTPATIENT)
Dept: GYNECOLOGIC ONCOLOGY | Age: 73
End: 2023-11-10

## 2023-11-10 ENCOUNTER — OFFICE VISIT (OUTPATIENT)
Dept: GYNECOLOGIC ONCOLOGY | Age: 73
End: 2023-11-10

## 2023-11-10 VITALS
OXYGEN SATURATION: 95 % | HEIGHT: 66 IN | DIASTOLIC BLOOD PRESSURE: 71 MMHG | BODY MASS INDEX: 28.96 KG/M2 | HEART RATE: 97 BPM | WEIGHT: 180.2 LBS | SYSTOLIC BLOOD PRESSURE: 129 MMHG

## 2023-11-10 DIAGNOSIS — C54.1 MALIGNANT NEOPLASM OF ENDOMETRIUM (HCC): Primary | ICD-10-CM

## 2023-11-10 PROCEDURE — 99024 POSTOP FOLLOW-UP VISIT: CPT | Performed by: PHYSICIAN ASSISTANT

## 2023-11-10 ASSESSMENT — ENCOUNTER SYMPTOMS
GASTROINTESTINAL NEGATIVE: 1
EYES NEGATIVE: 1
RESPIRATORY NEGATIVE: 1

## 2023-11-10 NOTE — PROGRESS NOTES
[address] [facility], [facility] [address], [address] [facility] [address]    Laurence Ybarra is a 68 y.o. female who presents for a Post Operative visit today     CC/HPI: Patient is a 50-year-old postmenopausal female who was experiencing postmenopausal bleeding in October 2023. Of note the patient does have a history of stroke in 2019 and was on anticoagulation with Xarelto therapy. She reported to have symptoms of vaginal bleeding after initiation of the Xarelto therapy. Pelvic ultrasound obtained in 09/2019 which revealed a thickened endometrial stripe at 0.9 cm. At this time the patient reported endometrial sampling which returned benign. However her symptoms persisted and became heavy vaginal bleeding in October which prompted the further work-up and repeat tissue sampling. A CT abdomen pelvis was performed on 10/3/2023 which revealed a hypodensity in the endometrial canal concerning for endometrial mass. Otherwise no evidence of metastatic disease or lymphadenopathy documented. She had a endometrial biopsy performed by her local gynecologist which later returned to serous carcinoma of the uterus on 10/9/2023. Patient was then referred to [facility] gynecologic oncology for further evaluation and treatment recommendations. Patient wished to proceed with surgical intervention. She ultimately underwent a robotic assisted hysterectomy, bilateral salpingo-oophorectomy, sentinel lymph node mapping and bilateral pelvic lymph node dissection, right periaortic lymph node and omental biopsy on 10/25/2023. Intraoperatively, there is evidence of firm nodules in the bilateral uterine cornua and anterior uterine surface. In the pelvic lymph node dissection there is a 0.5 cm firm fixed lymph node in the left obturator space that was cemented to the obturator nerve, unable to be completely resected due to the risk of massive pelvic hemorrhage.     Final pathology revealed

## 2023-11-10 NOTE — PROGRESS NOTES
Review of Systems   Constitutional: Negative. HENT:  Negative. Eyes: Negative. Respiratory: Negative. Cardiovascular:  Positive for leg swelling (feet swelling). Gastrointestinal: Negative. Endocrine: Negative. Genitourinary: Negative. Musculoskeletal: Negative. Skin: Negative. Neurological: Negative. Hematological: Negative.

## 2023-11-10 NOTE — TELEPHONE ENCOUNTER
Filled out [employer] financial assistance form with patient, while at her appointment. Patient's out of pocket will be $0. Patient voiced understanding.

## 2023-11-13 ENCOUNTER — TELEPHONE (OUTPATIENT)
Dept: ONCOLOGY | Age: 73
End: 2023-11-13

## 2023-11-13 ENCOUNTER — TELEPHONE (OUTPATIENT)
Dept: GYNECOLOGIC ONCOLOGY | Age: 73
End: 2023-11-13

## 2023-11-13 NOTE — TELEPHONE ENCOUNTER
[employer] Requisition has been filled out for this patient. I will need to know which testing needs to be done and form needs signed. Called and spoke to patient regarding cancelled PET Scan. Patient states she is going to use the Mobile PET scan in [place] on November 30. Patient would like to try the Mobile Phlebotomy for her [employer] Lab Draw. This has been marked on her [employer] Requisition.

## 2023-11-13 NOTE — TELEPHONE ENCOUNTER
Name: Jerry Sanchez  : 1950  MRN: [id number]    Oncology Navigation- Initial Note:  -First Attempt    Intake-  Contact Type: Telephone    Continuum of Care: Diagnosis/Active Treatment    Notes: Call made to patient. No answer. Left message on answering machine requesting call back. Call back information with office hours left on VM. Call made to [facility] [place]. PET scan at [facility] confirmed.   Scheduled for 23 @ 10 am with 9:45 arrival.     Electronically signed by Lupe Alonso RN on 2023 at 3:06 PM

## 2023-11-15 ENCOUNTER — TELEPHONE (OUTPATIENT)
Dept: ONCOLOGY | Age: 73
End: 2023-11-15

## 2023-11-15 ENCOUNTER — OFFICE VISIT (OUTPATIENT)
Dept: ONCOLOGY | Age: 73
End: 2023-11-15
Payer: MEDICARE

## 2023-11-15 VITALS
BODY MASS INDEX: 29.49 KG/M2 | HEART RATE: 104 BPM | TEMPERATURE: 97.6 F | RESPIRATION RATE: 18 BRPM | DIASTOLIC BLOOD PRESSURE: 67 MMHG | SYSTOLIC BLOOD PRESSURE: 120 MMHG | HEIGHT: 65 IN | WEIGHT: 177 LBS

## 2023-11-15 DIAGNOSIS — C77.5 ADENOCARCINOMA METASTATIC TO PELVIC LYMPH NODE (HCC): ICD-10-CM

## 2023-11-15 DIAGNOSIS — C54.9 SEROUS CARCINOMA OF BODY OF UTERUS (HCC): Primary | ICD-10-CM

## 2023-11-15 DIAGNOSIS — C54.1 MALIGNANT NEOPLASM OF ENDOMETRIUM (HCC): ICD-10-CM

## 2023-11-15 PROCEDURE — 99205 OFFICE O/P NEW HI 60 MIN: CPT | Performed by: INTERNAL MEDICINE

## 2023-11-15 RX ORDER — OMEPRAZOLE 20 MG/1
20 TABLET, DELAYED RELEASE ORAL DAILY
Qty: 30 TABLET | Refills: 3 | Status: SHIPPED | OUTPATIENT
Start: 2023-11-15

## 2023-11-15 RX ORDER — LIDOCAINE AND PRILOCAINE 25; 25 MG/G; MG/G
CREAM TOPICAL
Qty: 30 G | Refills: 2 | Status: SHIPPED | OUTPATIENT
Start: 2023-11-15

## 2023-11-15 RX ORDER — ONDANSETRON HYDROCHLORIDE 8 MG/1
8 TABLET, FILM COATED ORAL EVERY 8 HOURS PRN
Qty: 30 TABLET | Refills: 2 | Status: SHIPPED | OUTPATIENT
Start: 2023-11-15

## 2023-11-15 NOTE — PATIENT INSTRUCTIONS
Need [facility], see orders for chemotherapy, the timing of starting chemotherapy will depend on GYN oncology  Needs CBC CMP and  on day 1 of cycle 1 of chemo

## 2023-11-15 NOTE — PROGRESS NOTES
DIAGNOSIS:   Stage IIIA (FIGO stage IIIc1)  ([id number]) endometrial cancer, High grade serous carcinoma. CURRENT THERAPY:  Status post radical hysterectomy, bilateral salpingo-oophorectomy and lymph node dissection 10/25/2023  Plan chemoimmunotherapy with carboplatin, Taxol and Dostarlimab postoperatively  BRIEF CASE HISTORY:   Roxana Danielle is a very pleasant 68 y.o. female who is referred to us for recently diagnosed endometrial cancer. She presented with postmenopausal bleeding. She was initially on anticoagulation and the bleeding was thought to be due to anticoagulation. However further work-up showed an ultrasound that showed thickened endometrium. In 2019, underwent endometrial sampling that was benign. Because of persistent symptoms, CT scan was done in October/23 and revealed a hypodensity endometrial canal concerning for endometrial mass. Biopsy showed serous carcinoma on 10/9/2023. The patient was referred to gynecology oncology and ultimately underwent robotic assisted radical hysterectomy and lymph node sampling on 10/25/2023. The tumor measured 5.4 cm and invaded through the [place] atrium to the serosal surface. There was significant lymphovascular invasion. Ovaries and fallopian tubes were negative. There was metastatic carcinoma that present on the right obturator and external iliac (bilateral)  lymph nodes. Pelvic washings were positive for non-small cell carcinoma . of note, that the right obturator node was stuck to the obturator canal and was not removed for risk of bleeding. Paraortic lymph nodes were negative. Pathological staging is T3 aN1 M0  Patient did well postoperatively. She was referred to us for consideration of chemoimmunotherapy based on guidelines. She is sent to us for a consultation, covering nicely from surgery. Case was discussed with GYN oncology as needed that she might. He to start chemotherapy in the near future.     PAST MEDICAL HISTORY: has a past

## 2023-11-15 NOTE — TELEPHONE ENCOUNTER
Name: Dc Swan  : 1950  MRN: [id number]    Oncology Navigation- Initial Note:    Intake-  Contact Type: Telephone    Continuum of Care: Diagnosis/Active Treatment    Smoking hx:  Otoniel Krishnamurthy denies smoking or hx or smoking    Notes: Call received from Otoniel Krishnamurthy. Writer introduced self as ONN and navigation program.  Otoniel Krishnamurthy states she lives at home with a son. Otoniel Krishnamurthy states that she does have a good support system. Otoniel Krishnamurthy drives and has a son and daughter in law that can drive her if needed. The son that lives with her is unable to drive. Otoniel Krishnamurthy denies any financial concerns at this time and has [employer] Otoniel Krishnamurthy denies any weight loss and is eating and drinking without issues. Pt has no issues since surgery. Otoniel Krishnamurthy denies smoking, drinks alcohol on occasion, and denies drug use. Otoniel Krishnamurthy informed that navigation folder with business card and office hours will be left here for her at appointment. Otoniel Krishnamurthy encouraged to call with any question or concerns. Will continue to follow.      Electronically signed by Stevan Vences RN on 11/15/2023 at 12:11 PM

## 2023-11-16 ENCOUNTER — TELEPHONE (OUTPATIENT)
Dept: GYNECOLOGIC ONCOLOGY | Age: 73
End: 2023-11-16

## 2023-11-16 NOTE — TELEPHONE ENCOUNTER
Called patient to inquire if [facility] blood draw has been scheduled. Patient states she has the kits, but [facility] hasn't called her back with a date. Informed patient that Sudheer will call early next week to ensure date scheduled and perhaps have a plan B. Patient voiced understanding and appreciation.

## 2023-11-20 DIAGNOSIS — C54.9 SEROUS CARCINOMA OF BODY OF UTERUS (HCC): Primary | ICD-10-CM

## 2023-11-20 RX ORDER — HEPARIN SODIUM (PORCINE) LOCK FLUSH IV SOLN 100 UNIT/ML 100 UNIT/ML
500 SOLUTION INTRAVENOUS PRN
OUTPATIENT
Start: 2023-11-20

## 2023-11-20 RX ORDER — SODIUM CHLORIDE 0.9 % (FLUSH) 0.9 %
5-40 SYRINGE (ML) INJECTION PRN
OUTPATIENT
Start: 2023-11-20

## 2023-11-20 RX ORDER — SODIUM CHLORIDE 9 MG/ML
25 INJECTION, SOLUTION INTRAVENOUS PRN
OUTPATIENT
Start: 2023-11-20

## 2023-11-21 ENCOUNTER — TELEPHONE (OUTPATIENT)
Dept: GYNECOLOGIC ONCOLOGY | Age: 73
End: 2023-11-21

## 2023-11-21 NOTE — TELEPHONE ENCOUNTER
Patient returned phone call and informed writer that [employer] mobile phlebotomy collected sample this AM.  Writer voiced appreciation for call back.

## 2023-11-29 ENCOUNTER — TELEPHONE (OUTPATIENT)
Dept: ONCOLOGY | Age: 73
End: 2023-11-29

## 2023-11-29 NOTE — TELEPHONE ENCOUNTER
Name: Elenita Arvizu  : 1950  MRN: [id number]    Oncology Navigation Follow-Up Note    Contact Type:  Telephone    Notes: Pt called with questions regarding PET scan she is scheduled for at 10 am tomorrow morning. Pt asked if she was okay to take home medications. Pt informed that she was able to take all medications with water except diabetic medications in the AM.  Maia Lambert verbalized understanding and denies any other questions at this time. Pt also informed that she can not have anything to eat for 6 hours prior to testing but is okay to drink water. Understanding verbalized.         Electronically signed by Shonda Chau RN on 2023 at 4:33 PM

## 2023-11-30 ENCOUNTER — HOSPITAL ENCOUNTER (OUTPATIENT)
Dept: PET IMAGING | Age: 73
Discharge: HOME | End: 2023-12-02
Payer: MEDICARE

## 2023-11-30 DIAGNOSIS — C54.1 MALIGNANT NEOPLASM OF ENDOMETRIUM (HCC): ICD-10-CM

## 2023-11-30 PROCEDURE — A9552 F18 FDG: HCPCS | Performed by: PHYSICIAN ASSISTANT

## 2023-11-30 PROCEDURE — 3430000000 HC RX DIAGNOSTIC RADIOPHARMACEUTICAL: Performed by: PHYSICIAN ASSISTANT

## 2023-11-30 PROCEDURE — 78815 PET IMAGE W/CT SKULL-THIGH: CPT

## 2023-11-30 RX ORDER — FLUDEOXYGLUCOSE F 18 200 MCI/ML
14 INJECTION, SOLUTION INTRAVENOUS
Status: COMPLETED | OUTPATIENT
Start: 2023-11-30 | End: 2023-11-30

## 2023-11-30 RX ADMIN — FLUDEOXYGLUCOSE F 18 14 MILLICURIE: 200 INJECTION, SOLUTION INTRAVENOUS at 10:15

## 2023-12-08 ENCOUNTER — OFFICE VISIT (OUTPATIENT)
Dept: GYNECOLOGIC ONCOLOGY | Age: 73
End: 2023-12-08

## 2023-12-08 ENCOUNTER — HOSPITAL ENCOUNTER (OUTPATIENT)
Age: 73
Setting detail: SPECIMEN
Discharge: HOME | End: 2023-12-08

## 2023-12-08 VITALS
WEIGHT: 177 LBS | HEIGHT: 66 IN | DIASTOLIC BLOOD PRESSURE: 83 MMHG | SYSTOLIC BLOOD PRESSURE: 149 MMHG | BODY MASS INDEX: 28.45 KG/M2 | HEART RATE: 105 BPM | OXYGEN SATURATION: 96 %

## 2023-12-08 DIAGNOSIS — C54.1 MALIGNANT NEOPLASM OF ENDOMETRIUM (HCC): Primary | ICD-10-CM

## 2023-12-08 DIAGNOSIS — Z90.710 S/P HYSTERECTOMY: ICD-10-CM

## 2023-12-08 DIAGNOSIS — C54.1 MALIGNANT NEOPLASM OF ENDOMETRIUM (HCC): ICD-10-CM

## 2023-12-08 LAB
CANDIDA SPECIES: NEGATIVE
GARDNERELLA VAGINALIS: NEGATIVE
SOURCE: NORMAL
TRICHOMONAS: NEGATIVE

## 2023-12-08 PROCEDURE — 99024 POSTOP FOLLOW-UP VISIT: CPT | Performed by: PHYSICIAN ASSISTANT

## 2023-12-08 ASSESSMENT — ENCOUNTER SYMPTOMS
RESPIRATORY NEGATIVE: 1
GASTROINTESTINAL NEGATIVE: 1
EYES NEGATIVE: 1

## 2023-12-08 NOTE — PROGRESS NOTES
Review of Systems   Constitutional: Negative. HENT:  Negative. Eyes: Negative. Respiratory: Negative. Cardiovascular: Negative. Gastrointestinal: Negative. Endocrine: Negative. Genitourinary: Negative. Musculoskeletal: Negative. Skin: Negative. Neurological: Negative. Hematological: Negative.
at midline vaginal cuff. Bleeding present with granulation tissue bed present. Region treated with silver nitrate. Gentle bi manual examination exam reveals smooth vaginal canal, no induration of vaginal cuff. Assessment:   Cancer Staging   Malignant neoplasm of endometrium [facility])  Staging form: Corpus Uteri - Carcinoma And Carcinosarcoma, AJCC 8th Edition  - Clinical stage from 10/25/2023: FIGO Stage IIIA, calculated as Stage IIIC1 (cT3a, cN1a, cM0) - Signed by Dc Morgan MD on 11/1/2023    Post Operative Changes:  Stable    Discussed final pathology results with the patient and her daughter in law present, and all questions were answered to their satisfaction. Plan:  NCCN guidelines: FIGO stage IIIC1 high-grade serous carcinoma of the endometrium. Recommend adjuvant treatment with chemotherapy and immunotherapy (platin/taxol and dostarlimab) followed by possible radiation therapy. Cleared to begin treatment now, discussed delayed healing once starting chemotherapy but not indication to hold initiation of treatment     Continue activity restrictions for another 4-6 weeks including no heavy lifting, nothing in the vagina, including no intercourse, tampons or douching. Will follow up on vaginal cultures and treat accordingly    Recommend 6 oz glass prune juice daily to aid in bowel movements. Avoid straining. Use senna tablets 1-2 x daily if needed. Follow Up Instructions:  Return to clinic in 4 weeks for vaginal cuff check.     Electronically signed by Sincere Leigh PA-C on 12/8/23 at 1:32 PM

## 2023-12-10 LAB
MICROORGANISM SPEC CULT: ABNORMAL
MICROORGANISM/AGENT SPEC: ABNORMAL
MICROORGANISM/AGENT SPEC: ABNORMAL
SPECIMEN DESCRIPTION: ABNORMAL

## 2023-12-12 ENCOUNTER — HOSPITAL ENCOUNTER (OUTPATIENT)
Dept: INFUSION THERAPY | Age: 73
Discharge: HOME | End: 2023-12-12

## 2023-12-12 ENCOUNTER — TELEPHONE (OUTPATIENT)
Dept: ONCOLOGY | Age: 73
End: 2023-12-12

## 2023-12-12 DIAGNOSIS — C77.5 ADENOCARCINOMA METASTATIC TO PELVIC LYMPH NODE (HCC): ICD-10-CM

## 2023-12-12 DIAGNOSIS — C54.9 SEROUS CARCINOMA OF BODY OF UTERUS (HCC): Primary | ICD-10-CM

## 2023-12-12 DIAGNOSIS — C54.1 MALIGNANT NEOPLASM OF ENDOMETRIUM (HCC): ICD-10-CM

## 2023-12-12 NOTE — TELEPHONE ENCOUNTER
Chemotherapy teaching session conducted with patient and her son Minor Shirley. See Chemotherapy Teaching Sheets (scanned into chart). Discussed chemotherapy drugs Taxol, Carboplatin, Jemperli and their side effects. Drug information provided in writing. Explained procedure for contacting our office during and after office hours as described on Contact Information form that was also included in patient's packet. Importance of contacting center or physician for changes in condition, uncontrolled side effects or troubling symptoms was stressed. Discussed home precautions after chemotherapy, neutropenia precautions (low WBC's) and thrombocytopenia precautions (low platelets). Questions answered to patient's satisfaction. Patient verbalized understanding of information discussed. All information provided to patient in writing.

## 2023-12-13 ENCOUNTER — HOSPITAL ENCOUNTER (OUTPATIENT)
Dept: INTERVENTIONAL RADIOLOGY/VASCULAR | Age: 73
Discharge: HOME | End: 2023-12-15
Payer: MEDICARE

## 2023-12-13 VITALS
HEART RATE: 87 BPM | SYSTOLIC BLOOD PRESSURE: 126 MMHG | WEIGHT: 177 LBS | DIASTOLIC BLOOD PRESSURE: 67 MMHG | RESPIRATION RATE: 26 BRPM | BODY MASS INDEX: 28.45 KG/M2 | TEMPERATURE: 97.8 F | HEIGHT: 66 IN | OXYGEN SATURATION: 96 %

## 2023-12-13 DIAGNOSIS — C54.9 SEROUS CARCINOMA OF BODY OF UTERUS (HCC): ICD-10-CM

## 2023-12-13 DIAGNOSIS — C54.1 MALIGNANT NEOPLASM OF ENDOMETRIUM (HCC): ICD-10-CM

## 2023-12-13 DIAGNOSIS — C77.5 ADENOCARCINOMA METASTATIC TO PELVIC LYMPH NODE (HCC): ICD-10-CM

## 2023-12-13 DIAGNOSIS — C54.9 SEROUS CARCINOMA OF BODY OF UTERUS (HCC): Primary | ICD-10-CM

## 2023-12-13 LAB
ERYTHROCYTE [DISTWIDTH] IN BLOOD BY AUTOMATED COUNT: 14.1 % (ref 11.8–14.4)
HCT VFR BLD AUTO: 30.5 % (ref 36.3–47.1)
HGB BLD-MCNC: 9 G/DL (ref 11.9–15.1)
INR PPP: 1
MCH RBC QN AUTO: 27.5 PG (ref 25.2–33.5)
MCHC RBC AUTO-ENTMCNC: 29.5 G/DL (ref 28.4–34.8)
MCV RBC AUTO: 93.3 FL (ref 82.6–102.9)
MICROORGANISM SPEC CULT: ABNORMAL
MICROORGANISM SPEC CULT: ABNORMAL
MICROORGANISM/AGENT SPEC: ABNORMAL
MICROORGANISM/AGENT SPEC: ABNORMAL
NRBC BLD-RTO: 0 PER 100 WBC
PLATELET # BLD AUTO: 409 K/UL (ref 138–453)
PMV BLD AUTO: 9.3 FL (ref 8.1–13.5)
POTASSIUM SERPL-SCNC: 4.1 MMOL/L (ref 3.7–5.3)
PROTHROMBIN TIME: 13.6 SEC (ref 11.9–14.8)
RBC # BLD AUTO: 3.27 M/UL (ref 3.95–5.11)
SPECIMEN DESCRIPTION: ABNORMAL
WBC OTHER # BLD: 6.4 K/UL (ref 3.5–11.3)

## 2023-12-13 PROCEDURE — C1769 GUIDE WIRE: HCPCS

## 2023-12-13 PROCEDURE — 2500000003 HC RX 250 WO HCPCS

## 2023-12-13 PROCEDURE — 6360000002 HC RX W HCPCS: Performed by: RADIOLOGY

## 2023-12-13 PROCEDURE — 85027 COMPLETE CBC AUTOMATED: CPT

## 2023-12-13 PROCEDURE — 2580000003 HC RX 258: Performed by: RADIOLOGY

## 2023-12-13 PROCEDURE — 7100000010 HC PHASE II RECOVERY - FIRST 15 MIN

## 2023-12-13 PROCEDURE — 84132 ASSAY OF SERUM POTASSIUM: CPT

## 2023-12-13 PROCEDURE — 36415 COLL VENOUS BLD VENIPUNCTURE: CPT

## 2023-12-13 PROCEDURE — 2709999900 HC NON-CHARGEABLE SUPPLY

## 2023-12-13 PROCEDURE — 7100000011 HC PHASE II RECOVERY - ADDTL 15 MIN

## 2023-12-13 PROCEDURE — 6360000002 HC RX W HCPCS

## 2023-12-13 PROCEDURE — C1894 INTRO/SHEATH, NON-LASER: HCPCS

## 2023-12-13 PROCEDURE — 2500000003 HC RX 250 WO HCPCS: Performed by: RADIOLOGY

## 2023-12-13 PROCEDURE — 36561 INSERT TUNNELED CV CATH: CPT

## 2023-12-13 PROCEDURE — 85610 PROTHROMBIN TIME: CPT

## 2023-12-13 PROCEDURE — C1788 PORT, INDWELLING, IMP: HCPCS

## 2023-12-13 RX ORDER — BUPIVACAINE HYDROCHLORIDE AND EPINEPHRINE 5; 5 MG/ML; UG/ML
INJECTION, SOLUTION EPIDURAL; INTRACAUDAL; PERINEURAL PRN
Status: COMPLETED | OUTPATIENT
Start: 2023-12-13 | End: 2023-12-13

## 2023-12-13 RX ORDER — DIPHENHYDRAMINE HYDROCHLORIDE 50 MG/ML
50 INJECTION INTRAMUSCULAR; INTRAVENOUS
OUTPATIENT
Start: 2023-12-13

## 2023-12-13 RX ORDER — FAMOTIDINE 10 MG/ML
20 INJECTION, SOLUTION INTRAVENOUS ONCE
OUTPATIENT
Start: 2023-12-13 | End: 2023-12-13

## 2023-12-13 RX ORDER — ALBUTEROL SULFATE 90 UG/1
4 AEROSOL, METERED RESPIRATORY (INHALATION) PRN
OUTPATIENT
Start: 2023-12-13

## 2023-12-13 RX ORDER — FAMOTIDINE 10 MG/ML
20 INJECTION, SOLUTION INTRAVENOUS
OUTPATIENT
Start: 2023-12-13

## 2023-12-13 RX ORDER — SODIUM CHLORIDE 0.9 % (FLUSH) 0.9 %
SYRINGE (ML) INJECTION PRN
Status: COMPLETED | OUTPATIENT
Start: 2023-12-13 | End: 2023-12-13

## 2023-12-13 RX ORDER — CEFAZOLIN SODIUM IN 0.9 % NACL 2 G/100 ML
2000 PLASTIC BAG, INJECTION (ML) INTRAVENOUS ONCE
Status: COMPLETED | OUTPATIENT
Start: 2023-12-13 | End: 2023-12-13

## 2023-12-13 RX ORDER — MEPERIDINE HYDROCHLORIDE 50 MG/ML
12.5 INJECTION INTRAMUSCULAR; INTRAVENOUS; SUBCUTANEOUS PRN
OUTPATIENT
Start: 2023-12-13

## 2023-12-13 RX ORDER — BUPIVACAINE HYDROCHLORIDE 5 MG/ML
INJECTION, SOLUTION EPIDURAL; INTRACAUDAL PRN
Status: COMPLETED | OUTPATIENT
Start: 2023-12-13 | End: 2023-12-13

## 2023-12-13 RX ORDER — ACETAMINOPHEN 325 MG/1
650 TABLET ORAL
OUTPATIENT
Start: 2023-12-13

## 2023-12-13 RX ORDER — DEXAMETHASONE SODIUM PHOSPHATE 10 MG/ML
10 INJECTION INTRAMUSCULAR; INTRAVENOUS ONCE
OUTPATIENT
Start: 2023-12-13 | End: 2023-12-13

## 2023-12-13 RX ORDER — FENTANYL CITRATE 50 UG/ML
INJECTION, SOLUTION INTRAMUSCULAR; INTRAVENOUS PRN
Status: COMPLETED | OUTPATIENT
Start: 2023-12-13 | End: 2023-12-13

## 2023-12-13 RX ORDER — SODIUM CHLORIDE 0.9 % (FLUSH) 0.9 %
5-40 SYRINGE (ML) INJECTION PRN
OUTPATIENT
Start: 2023-12-13

## 2023-12-13 RX ORDER — ACETAMINOPHEN 325 MG/1
650 TABLET ORAL EVERY 4 HOURS PRN
Status: DISCONTINUED | OUTPATIENT
Start: 2023-12-13 | End: 2023-12-16 | Stop reason: HOSPADM

## 2023-12-13 RX ORDER — HEPARIN 100 UNIT/ML
SYRINGE INTRAVENOUS PRN
Status: COMPLETED | OUTPATIENT
Start: 2023-12-13 | End: 2023-12-13

## 2023-12-13 RX ORDER — MIDAZOLAM HYDROCHLORIDE 2 MG/2ML
INJECTION, SOLUTION INTRAMUSCULAR; INTRAVENOUS PRN
Status: COMPLETED | OUTPATIENT
Start: 2023-12-13 | End: 2023-12-13

## 2023-12-13 RX ORDER — HEPARIN 100 UNIT/ML
500 SYRINGE INTRAVENOUS PRN
OUTPATIENT
Start: 2023-12-13

## 2023-12-13 RX ORDER — ONDANSETRON 2 MG/ML
8 INJECTION INTRAMUSCULAR; INTRAVENOUS
OUTPATIENT
Start: 2023-12-13

## 2023-12-13 RX ORDER — DIPHENHYDRAMINE HYDROCHLORIDE 50 MG/ML
50 INJECTION INTRAMUSCULAR; INTRAVENOUS ONCE
OUTPATIENT
Start: 2023-12-13 | End: 2023-12-13

## 2023-12-13 RX ORDER — SODIUM CHLORIDE 9 MG/ML
5-250 INJECTION, SOLUTION INTRAVENOUS PRN
OUTPATIENT
Start: 2023-12-13

## 2023-12-13 RX ORDER — PALONOSETRON 0.05 MG/ML
0.25 INJECTION, SOLUTION INTRAVENOUS ONCE
OUTPATIENT
Start: 2023-12-13 | End: 2023-12-13

## 2023-12-13 RX ORDER — EPINEPHRINE 1 MG/ML
0.3 INJECTION, SOLUTION, CONCENTRATE INTRAVENOUS PRN
OUTPATIENT
Start: 2023-12-13

## 2023-12-13 RX ORDER — SODIUM CHLORIDE 9 MG/ML
INJECTION, SOLUTION INTRAVENOUS CONTINUOUS
OUTPATIENT
Start: 2023-12-13

## 2023-12-13 RX ADMIN — MIDAZOLAM HYDROCHLORIDE 1 MG: 1 INJECTION, SOLUTION INTRAMUSCULAR; INTRAVENOUS at 12:07

## 2023-12-13 RX ADMIN — FENTANYL CITRATE 50 MCG: 50 INJECTION, SOLUTION INTRAMUSCULAR; INTRAVENOUS at 12:08

## 2023-12-13 RX ADMIN — BUPIVACAINE HYDROCHLORIDE AND EPINEPHRINE BITARTRATE 8 ML: 5; .0091 INJECTION, SOLUTION EPIDURAL; INTRACAUDAL; PERINEURAL at 12:21

## 2023-12-13 RX ADMIN — BUPIVACAINE HYDROCHLORIDE 2 ML: 5 INJECTION, SOLUTION EPIDURAL; INTRACAUDAL; PERINEURAL at 12:10

## 2023-12-13 RX ADMIN — SODIUM CHLORIDE, PRESERVATIVE FREE 10 ML: 5 INJECTION INTRAVENOUS at 12:27

## 2023-12-13 RX ADMIN — HEPARIN 300 UNITS: 100 SYRINGE at 12:27

## 2023-12-13 RX ADMIN — Medication 2000 MG: at 11:48

## 2023-12-13 NOTE — POST SEDATION
Sedation Post Procedure Note    Patient Name: Charla Doran   YOB: 1950  Room/Bed: Room/bed info not found  Medical Record Number: [id number]  Date: 12/13/2023   Time: 12:51 PM         Physicians/Assistants: Roberto Knott MD, MD    Procedure Performed:  Port placement    Post-Sedation Vital Signs:  Vitals:    12/13/23 1234   BP: (!) 111/52   Pulse: 92   Resp: 17   Temp:    SpO2: 97%      Vital signs were reviewed and were stable after the procedure (see flow sheet for vitals)            Post-Sedation Exam: Responding appropriately, breathing spontaneously           Complications: none    Electronically signed by Roberto Knott MD on 12/13/2023 at 12:51 PM

## 2023-12-13 NOTE — PROGRESS NOTES
All discharge instructions given with family at side. All questions answered at this time. All belongings returned. PIV removed per protocol.

## 2023-12-13 NOTE — PROGRESS NOTES
Inpatients must meet criteria 1 through 7.   1. Minimum 30 minutes after last dose of sedative medication, minimum 120 minutes after last dose of reversal agent. Yes   2. Systolic BP stable within 20 mmHg for 30 minutes & systolic BP between 90 & 599 or within 10 mmHg of baseline. Yes   3. Pulse between 60 and 100 or within 10 bpm of baseline. Yes   4. Spontaneous respiratory rate >/= 10 per minute. Yes   5. SaO2 >/= 95 or >/= baseline. Yes   6. Able to cough and swallow or return to baseline function. Yes   7. Alert and oriented or return to baseline mental status. Yes   8. Demonstrates controlled, coordinated movements, ambulates with steady gait, or return to baseline activity function. Yes   9. Minimal or no pain or nausea, or at a level tolerable and acceptable to patient. Yes   10. Takes and retains oral fluids as allowed. Yes   11. Procedural / perioperative site stable. Minimal or no bleeding. Yes   12. If GI endoscopy procedure, minimal or no abdominal distention or passing flatus. N/A   13. Written discharge instructions and emergency telephone number provided. Yes   14. Accompanied by a responsible adult. Yes   Adult patient discharged from facility without responsible person meets above criteria plus the following:   a) remains awake without stimulus for 30 minutes   b) oriented appropriate for age   c) all vital signs stable   d) no significant risk of losing protective reflexes   e) able to maintain pre-procedure mobility without assistance   f) no nausea or dizziness   g) transportation arrangements that do not require patient to operate motor Vehicle.    N/A

## 2023-12-13 NOTE — PRE SEDATION
Planned:   midazolam (Versed) intravenously and fentanyl intravenously    Patient is an appropriate candidate for plan of sedation: yes    Electronically signed by Gonzalo Islas MD on 12/13/2023 at 11:53 AM

## 2023-12-13 NOTE — PROGRESS NOTES
Dressed for home. Taken by wheelchair to private car driven by family, all belonging sent with patient.

## 2023-12-14 ENCOUNTER — HOSPITAL ENCOUNTER (OUTPATIENT)
Age: 73
Discharge: HOME | End: 2023-12-14
Payer: MEDICARE

## 2023-12-14 ENCOUNTER — HOSPITAL ENCOUNTER (OUTPATIENT)
Dept: CT IMAGING | Age: 73
Discharge: HOME | End: 2023-12-16
Payer: MEDICARE

## 2023-12-14 DIAGNOSIS — Z90.710 S/P HYSTERECTOMY: ICD-10-CM

## 2023-12-14 DIAGNOSIS — I10 HYPERTENSION, UNSPECIFIED TYPE: ICD-10-CM

## 2023-12-14 DIAGNOSIS — N76.0 VAGINAL INFECTION: ICD-10-CM

## 2023-12-14 DIAGNOSIS — C54.9 SEROUS CARCINOMA OF BODY OF UTERUS (HCC): Primary | ICD-10-CM

## 2023-12-14 DIAGNOSIS — C54.9 SEROUS CARCINOMA OF BODY OF UTERUS (HCC): ICD-10-CM

## 2023-12-14 LAB
ALBUMIN SERPL-MCNC: 3.8 G/DL (ref 3.5–5.2)
ALBUMIN/GLOB SERPL: 1.3 {RATIO} (ref 1–2.5)
ALP SERPL-CCNC: 71 U/L (ref 35–104)
ALT SERPL-CCNC: 6 U/L (ref 5–33)
ANION GAP SERPL CALCULATED.3IONS-SCNC: 12 MMOL/L (ref 9–17)
AST SERPL-CCNC: 11 U/L
BILIRUB SERPL-MCNC: 0.3 MG/DL (ref 0.3–1.2)
BUN SERPL-MCNC: 11 MG/DL (ref 8–23)
BUN/CREAT SERPL: 14 (ref 9–20)
CALCIUM SERPL-MCNC: 9.5 MG/DL (ref 8.6–10.4)
CHLORIDE SERPL-SCNC: 105 MMOL/L (ref 98–107)
CO2 SERPL-SCNC: 24 MMOL/L (ref 20–31)
CORTIS SERPL-MCNC: 19.9 UG/DL (ref 2.5–19.5)
CREAT SERPL-MCNC: 0.8 MG/DL (ref 0.5–0.9)
ERYTHROCYTE [DISTWIDTH] IN BLOOD BY AUTOMATED COUNT: 14.3 % (ref 11.8–14.4)
GFR SERPL CREATININE-BSD FRML MDRD: >60 ML/MIN/1.73M2
GLUCOSE SERPL-MCNC: 130 MG/DL (ref 70–99)
HCT VFR BLD AUTO: 31.1 % (ref 36.3–47.1)
HGB BLD-MCNC: 9 G/DL (ref 11.9–15.1)
MCH RBC QN AUTO: 27.4 PG (ref 25.2–33.5)
MCHC RBC AUTO-ENTMCNC: 28.9 G/DL (ref 28.4–34.8)
MCV RBC AUTO: 94.5 FL (ref 82.6–102.9)
NRBC BLD-RTO: 0 PER 100 WBC
PLATELET # BLD AUTO: 363 K/UL (ref 138–453)
PMV BLD AUTO: 10.1 FL (ref 8.1–13.5)
POTASSIUM SERPL-SCNC: 4.1 MMOL/L (ref 3.7–5.3)
PROT SERPL-MCNC: 6.8 G/DL (ref 6.4–8.3)
RBC # BLD AUTO: 3.29 M/UL (ref 3.95–5.11)
SODIUM SERPL-SCNC: 141 MMOL/L (ref 135–144)
WBC OTHER # BLD: 7.2 K/UL (ref 3.5–11.3)

## 2023-12-14 PROCEDURE — 36415 COLL VENOUS BLD VENIPUNCTURE: CPT

## 2023-12-14 PROCEDURE — 82533 TOTAL CORTISOL: CPT

## 2023-12-14 PROCEDURE — 74177 CT ABD & PELVIS W/CONTRAST: CPT

## 2023-12-14 PROCEDURE — 80053 COMPREHEN METABOLIC PANEL: CPT

## 2023-12-14 PROCEDURE — 6360000004 HC RX CONTRAST MEDICATION: Performed by: PHYSICIAN ASSISTANT

## 2023-12-14 RX ORDER — DEXAMETHASONE 4 MG/1
TABLET ORAL
Qty: 15 TABLET | Refills: 1 | Status: SHIPPED | OUTPATIENT
Start: 2023-12-14

## 2023-12-14 RX ADMIN — IOPAMIDOL 75 ML: 755 INJECTION, SOLUTION INTRAVENOUS at 09:18

## 2023-12-14 RX ADMIN — IOPAMIDOL 18 ML: 755 INJECTION, SOLUTION INTRAVENOUS at 09:18

## 2023-12-27 ENCOUNTER — HOSPITAL ENCOUNTER (OUTPATIENT)
Dept: INFUSION THERAPY | Age: 73
Discharge: HOME | End: 2023-12-27
Payer: MEDICARE

## 2023-12-27 VITALS
RESPIRATION RATE: 18 BRPM | SYSTOLIC BLOOD PRESSURE: 120 MMHG | HEIGHT: 65 IN | BODY MASS INDEX: 28.32 KG/M2 | DIASTOLIC BLOOD PRESSURE: 64 MMHG | HEART RATE: 117 BPM | TEMPERATURE: 97.6 F | WEIGHT: 170 LBS

## 2023-12-27 DIAGNOSIS — C54.9 SEROUS CARCINOMA OF BODY OF UTERUS (HCC): Primary | ICD-10-CM

## 2023-12-27 DIAGNOSIS — C54.1 MALIGNANT NEOPLASM OF ENDOMETRIUM (HCC): ICD-10-CM

## 2023-12-27 DIAGNOSIS — C77.5 ADENOCARCINOMA METASTATIC TO PELVIC LYMPH NODE (HCC): ICD-10-CM

## 2023-12-27 DIAGNOSIS — I10 HYPERTENSION, UNSPECIFIED TYPE: ICD-10-CM

## 2023-12-27 LAB
ALBUMIN SERPL-MCNC: 4.2 G/DL (ref 3.5–5.2)
ALBUMIN/GLOB SERPL: 1.3 {RATIO} (ref 1–2.5)
ALP SERPL-CCNC: 62 U/L (ref 35–104)
ALT SERPL-CCNC: 9 U/L (ref 5–33)
AMYLASE SERPL-CCNC: 57 U/L (ref 28–100)
ANION GAP SERPL CALCULATED.3IONS-SCNC: 16 MMOL/L (ref 9–17)
AST SERPL-CCNC: 10 U/L
BASOPHILS # BLD: <0.03 K/UL (ref 0–0.2)
BASOPHILS NFR BLD: 0 % (ref 0–2)
BILIRUB SERPL-MCNC: 0.5 MG/DL (ref 0.3–1.2)
BUN SERPL-MCNC: 14 MG/DL (ref 8–23)
BUN/CREAT SERPL: 16 (ref 9–20)
CALCIUM SERPL-MCNC: 9.5 MG/DL (ref 8.6–10.4)
CANCER AG125 SERPL-ACNC: 35 U/ML
CHLORIDE SERPL-SCNC: 97 MMOL/L (ref 98–107)
CO2 SERPL-SCNC: 21 MMOL/L (ref 20–31)
CORTIS SERPL-MCNC: 2.3 UG/DL (ref 2.7–18.4)
CORTISOL COLLECTION INFO: 814
CREAT SERPL-MCNC: 0.9 MG/DL (ref 0.5–0.9)
EOSINOPHIL # BLD: <0.03 K/UL (ref 0–0.44)
EOSINOPHILS RELATIVE PERCENT: 0 % (ref 1–4)
ERYTHROCYTE [DISTWIDTH] IN BLOOD BY AUTOMATED COUNT: 14.8 % (ref 11.8–14.4)
GFR SERPL CREATININE-BSD FRML MDRD: >60 ML/MIN/1.73M2
GLUCOSE SERPL-MCNC: 239 MG/DL (ref 70–99)
HCT VFR BLD AUTO: 33.2 % (ref 36.3–47.1)
HGB BLD-MCNC: 10 G/DL (ref 11.9–15.1)
IMM GRANULOCYTES # BLD AUTO: 0.03 K/UL (ref 0–0.3)
IMM GRANULOCYTES NFR BLD: 0 %
LIPASE SERPL-CCNC: 30 U/L (ref 13–60)
LYMPHOCYTES NFR BLD: 0.65 K/UL (ref 1.1–3.7)
LYMPHOCYTES RELATIVE PERCENT: 6 % (ref 24–43)
MCH RBC QN AUTO: 27.1 PG (ref 25.2–33.5)
MCHC RBC AUTO-ENTMCNC: 30.1 G/DL (ref 28.4–34.8)
MCV RBC AUTO: 90 FL (ref 82.6–102.9)
MONOCYTES NFR BLD: 0.06 K/UL (ref 0.1–1.2)
MONOCYTES NFR BLD: 1 % (ref 3–12)
NEUTROPHILS NFR BLD: 93 % (ref 36–65)
NEUTS SEG NFR BLD: 9.62 K/UL (ref 1.5–8.1)
NRBC BLD-RTO: 0 PER 100 WBC
PLATELET # BLD AUTO: 414 K/UL (ref 138–453)
PMV BLD AUTO: 9.3 FL (ref 8.1–13.5)
POTASSIUM SERPL-SCNC: 3.9 MMOL/L (ref 3.7–5.3)
PROT SERPL-MCNC: 7.5 G/DL (ref 6.4–8.3)
RBC # BLD AUTO: 3.69 M/UL (ref 3.95–5.11)
SODIUM SERPL-SCNC: 134 MMOL/L (ref 135–144)
TSH SERPL DL<=0.05 MIU/L-ACNC: 0.59 UIU/ML (ref 0.3–5)
WBC OTHER # BLD: 10.4 K/UL (ref 3.5–11.3)

## 2023-12-27 PROCEDURE — 6360000002 HC RX W HCPCS: Performed by: INTERNAL MEDICINE

## 2023-12-27 PROCEDURE — 36591 DRAW BLOOD OFF VENOUS DEVICE: CPT

## 2023-12-27 PROCEDURE — 96375 TX/PRO/DX INJ NEW DRUG ADDON: CPT

## 2023-12-27 PROCEDURE — 96413 CHEMO IV INFUSION 1 HR: CPT

## 2023-12-27 PROCEDURE — 82150 ASSAY OF AMYLASE: CPT

## 2023-12-27 PROCEDURE — 2500000003 HC RX 250 WO HCPCS: Performed by: INTERNAL MEDICINE

## 2023-12-27 PROCEDURE — 96367 TX/PROPH/DG ADDL SEQ IV INF: CPT

## 2023-12-27 PROCEDURE — 2580000003 HC RX 258: Performed by: INTERNAL MEDICINE

## 2023-12-27 PROCEDURE — 86304 IMMUNOASSAY TUMOR CA 125: CPT

## 2023-12-27 PROCEDURE — 82533 TOTAL CORTISOL: CPT

## 2023-12-27 PROCEDURE — 96417 CHEMO IV INFUS EACH ADDL SEQ: CPT

## 2023-12-27 PROCEDURE — 83690 ASSAY OF LIPASE: CPT

## 2023-12-27 PROCEDURE — 96415 CHEMO IV INFUSION ADDL HR: CPT

## 2023-12-27 PROCEDURE — 84443 ASSAY THYROID STIM HORMONE: CPT

## 2023-12-27 PROCEDURE — 85025 COMPLETE CBC W/AUTO DIFF WBC: CPT

## 2023-12-27 PROCEDURE — 80053 COMPREHEN METABOLIC PANEL: CPT

## 2023-12-27 RX ORDER — DIPHENHYDRAMINE HYDROCHLORIDE 50 MG/ML
50 INJECTION INTRAMUSCULAR; INTRAVENOUS ONCE
Status: COMPLETED | OUTPATIENT
Start: 2023-12-27 | End: 2023-12-27

## 2023-12-27 RX ORDER — FAMOTIDINE 10 MG/ML
20 INJECTION, SOLUTION INTRAVENOUS ONCE
Status: COMPLETED | OUTPATIENT
Start: 2023-12-27 | End: 2023-12-27

## 2023-12-27 RX ORDER — SODIUM CHLORIDE 9 MG/ML
5-250 INJECTION, SOLUTION INTRAVENOUS PRN
Status: DISCONTINUED | OUTPATIENT
Start: 2023-12-27 | End: 2023-12-28 | Stop reason: HOSPADM

## 2023-12-27 RX ORDER — DEXAMETHASONE SODIUM PHOSPHATE 10 MG/ML
10 INJECTION, SOLUTION INTRAMUSCULAR; INTRAVENOUS ONCE
Status: COMPLETED | OUTPATIENT
Start: 2023-12-27 | End: 2023-12-27

## 2023-12-27 RX ORDER — PALONOSETRON 0.05 MG/ML
0.25 INJECTION, SOLUTION INTRAVENOUS ONCE
Status: COMPLETED | OUTPATIENT
Start: 2023-12-27 | End: 2023-12-27

## 2023-12-27 RX ORDER — HEPARIN 100 UNIT/ML
500 SYRINGE INTRAVENOUS PRN
Status: DISCONTINUED | OUTPATIENT
Start: 2023-12-27 | End: 2023-12-28 | Stop reason: HOSPADM

## 2023-12-27 RX ORDER — SODIUM CHLORIDE 0.9 % (FLUSH) 0.9 %
5-40 SYRINGE (ML) INJECTION PRN
Status: DISCONTINUED | OUTPATIENT
Start: 2023-12-27 | End: 2023-12-28 | Stop reason: HOSPADM

## 2023-12-27 RX ADMIN — SODIUM CHLORIDE, PRESERVATIVE FREE 10 ML: 5 INJECTION INTRAVENOUS at 14:52

## 2023-12-27 RX ADMIN — DEXAMETHASONE SODIUM PHOSPHATE 10 MG: 10 INJECTION, SOLUTION INTRAMUSCULAR; INTRAVENOUS at 08:56

## 2023-12-27 RX ADMIN — DIPHENHYDRAMINE HYDROCHLORIDE 50 MG: 50 INJECTION INTRAMUSCULAR; INTRAVENOUS at 08:56

## 2023-12-27 RX ADMIN — SODIUM CHLORIDE 100 ML/HR: 9 INJECTION, SOLUTION INTRAVENOUS at 08:52

## 2023-12-27 RX ADMIN — FAMOTIDINE 20 MG: 10 INJECTION, SOLUTION INTRAVENOUS at 08:56

## 2023-12-27 RX ADMIN — HEPARIN 500 UNITS: 100 SYRINGE at 14:53

## 2023-12-27 RX ADMIN — SODIUM CHLORIDE 500 MG: 9 INJECTION, SOLUTION INTRAVENOUS at 10:09

## 2023-12-27 RX ADMIN — SODIUM CHLORIDE, PRESERVATIVE FREE 10 ML: 5 INJECTION INTRAVENOUS at 08:14

## 2023-12-27 RX ADMIN — FOSAPREPITANT 150 MG: 150 INJECTION, POWDER, LYOPHILIZED, FOR SOLUTION INTRAVENOUS at 09:15

## 2023-12-27 RX ADMIN — PALONOSETRON 0.25 MG: 0.05 INJECTION, SOLUTION INTRAVENOUS at 08:56

## 2023-12-27 RX ADMIN — SODIUM CHLORIDE, PRESERVATIVE FREE 10 ML: 5 INJECTION INTRAVENOUS at 14:53

## 2023-12-27 RX ADMIN — PACLITAXEL 336 MG: 6 INJECTION, SOLUTION, CONCENTRATE INTRAVENOUS at 10:54

## 2023-12-27 RX ADMIN — CARBOPLATIN 415 MG: 10 INJECTION INTRAVENOUS at 14:12

## 2023-12-27 RX ADMIN — SODIUM CHLORIDE, PRESERVATIVE FREE 10 ML: 5 INJECTION INTRAVENOUS at 08:15

## 2023-12-27 NOTE — PLAN OF CARE
Problem: Chronic Conditions and Co-morbidities  Goal: Patient's chronic conditions and co-morbidity symptoms are monitored and maintained or improved  Outcome: Adequate for Discharge     Problem: Chronic Conditions and Co-morbidities  Goal: Patient's chronic conditions and co-morbidity symptoms are monitored and maintained or improved  Outcome: Adequate for Discharge     Problem: Safety - Adult  Goal: Absence of infection signs and symptoms  Outcome: Adequate for Discharge

## 2023-12-27 NOTE — PROGRESS NOTES
An Initial visit. She engaged readily in conversation as this is her first treatment. She has Family support and son went to get her a sandwich for lunch today. She shared about her  dying here at this hospital before [place] years ago. Grief care offered. Prayers said with her at the end of the visit. She is grateful for visits and prayers. Continue to support. An assurance of prayers given. See Flowsheet.     Sister Lucy Julio, OSF/T  [facility]

## 2024-01-05 ENCOUNTER — OFFICE VISIT (OUTPATIENT)
Dept: GYNECOLOGIC ONCOLOGY | Age: 74
End: 2024-01-05

## 2024-01-05 ENCOUNTER — HOSPITAL ENCOUNTER (OUTPATIENT)
Dept: INFUSION THERAPY | Age: 74
Discharge: HOME | End: 2024-01-05
Payer: MEDICARE

## 2024-01-05 ENCOUNTER — TELEPHONE (OUTPATIENT)
Dept: GYNECOLOGIC ONCOLOGY | Age: 74
End: 2024-01-05

## 2024-01-05 VITALS
SYSTOLIC BLOOD PRESSURE: 134 MMHG | TEMPERATURE: 97.2 F | RESPIRATION RATE: 18 BRPM | HEART RATE: 94 BPM | DIASTOLIC BLOOD PRESSURE: 80 MMHG

## 2024-01-05 VITALS
OXYGEN SATURATION: 99 % | DIASTOLIC BLOOD PRESSURE: 84 MMHG | HEART RATE: 133 BPM | WEIGHT: 168 LBS | SYSTOLIC BLOOD PRESSURE: 145 MMHG | BODY MASS INDEX: 27 KG/M2 | HEIGHT: 66 IN

## 2024-01-05 DIAGNOSIS — C54.1 MALIGNANT NEOPLASM OF ENDOMETRIUM (HCC): Primary | ICD-10-CM

## 2024-01-05 PROCEDURE — 99024 POSTOP FOLLOW-UP VISIT: CPT | Performed by: PHYSICIAN ASSISTANT

## 2024-01-05 PROCEDURE — 2580000003 HC RX 258: Performed by: PHYSICIAN ASSISTANT

## 2024-01-05 PROCEDURE — 6360000002 HC RX W HCPCS: Performed by: PHYSICIAN ASSISTANT

## 2024-01-05 PROCEDURE — 96360 HYDRATION IV INFUSION INIT: CPT

## 2024-01-05 RX ORDER — HEPARIN SODIUM (PORCINE) LOCK FLUSH IV SOLN 100 UNIT/ML 100 UNIT/ML
500 SOLUTION INTRAVENOUS PRN
Status: CANCELLED | OUTPATIENT
Start: 2024-01-05

## 2024-01-05 RX ORDER — SODIUM CHLORIDE 0.9 % (FLUSH) 0.9 %
5-40 SYRINGE (ML) INJECTION PRN
OUTPATIENT
Start: 2024-01-05

## 2024-01-05 RX ORDER — SODIUM CHLORIDE 0.9 % (FLUSH) 0.9 %
5-40 SYRINGE (ML) INJECTION PRN
Status: DISCONTINUED | OUTPATIENT
Start: 2024-01-05 | End: 2024-01-06 | Stop reason: HOSPADM

## 2024-01-05 RX ORDER — SODIUM CHLORIDE 0.9 % (FLUSH) 0.9 %
5-40 SYRINGE (ML) INJECTION PRN
Status: CANCELLED | OUTPATIENT
Start: 2024-01-05

## 2024-01-05 RX ORDER — HEPARIN 100 UNIT/ML
500 SYRINGE INTRAVENOUS PRN
Status: DISCONTINUED | OUTPATIENT
Start: 2024-01-05 | End: 2024-01-06 | Stop reason: HOSPADM

## 2024-01-05 RX ORDER — 0.9 % SODIUM CHLORIDE 0.9 %
1000 INTRAVENOUS SOLUTION INTRAVENOUS ONCE
Status: CANCELLED | OUTPATIENT
Start: 2024-01-05 | End: 2024-01-05

## 2024-01-05 RX ORDER — HEPARIN 100 UNIT/ML
500 SYRINGE INTRAVENOUS PRN
OUTPATIENT
Start: 2024-01-05

## 2024-01-05 RX ORDER — SODIUM CHLORIDE 9 MG/ML
5-250 INJECTION, SOLUTION INTRAVENOUS PRN
Status: CANCELLED | OUTPATIENT
Start: 2024-01-05

## 2024-01-05 RX ORDER — 0.9 % SODIUM CHLORIDE 0.9 %
1000 INTRAVENOUS SOLUTION INTRAVENOUS ONCE
Status: COMPLETED | OUTPATIENT
Start: 2024-01-05 | End: 2024-01-05

## 2024-01-05 RX ORDER — SODIUM CHLORIDE 9 MG/ML
5-250 INJECTION, SOLUTION INTRAVENOUS PRN
OUTPATIENT
Start: 2024-01-05

## 2024-01-05 RX ADMIN — SODIUM CHLORIDE 1000 ML: 9 INJECTION, SOLUTION INTRAVENOUS at 11:22

## 2024-01-05 RX ADMIN — HEPARIN 500 UNITS: 100 SYRINGE at 12:28

## 2024-01-05 RX ADMIN — SODIUM CHLORIDE, PRESERVATIVE FREE 10 ML: 5 INJECTION INTRAVENOUS at 12:28

## 2024-01-05 RX ADMIN — SODIUM CHLORIDE, PRESERVATIVE FREE 10 ML: 5 INJECTION INTRAVENOUS at 11:20

## 2024-01-05 ASSESSMENT — ENCOUNTER SYMPTOMS
GASTROINTESTINAL NEGATIVE: 1
SORE THROAT: 1
RESPIRATORY NEGATIVE: 1
EYES NEGATIVE: 1

## 2024-01-05 NOTE — TELEPHONE ENCOUNTER
Pt's daughter Janis called asking if there are labs to be drawn before IV hydration today. Please advise Janis.

## 2024-01-05 NOTE — TELEPHONE ENCOUNTER
Please advise no current labs needed  Recommend IV hydration and follow up with medical oncology next week

## 2024-01-05 NOTE — PROGRESS NOTES
Review of Systems   Constitutional: Negative.    HENT:   Positive for sore throat (dry).    Eyes: Negative.    Respiratory: Negative.     Cardiovascular: Negative.    Gastrointestinal: Negative.    Endocrine: Negative.    Genitourinary: Negative.     Musculoskeletal: Negative.    Skin: Negative.    Neurological:  Positive for numbness (fingertips).   Hematological: Negative.       
    Pelvic examination reveals normal appearing external genitalia. Small speculum placed in the vagina reveals no abnormal discharge or blood present. Vaginal cuff appears fully intact with no areas of separation or granulation. Bi manual examination reveals smooth vaginal canal, in tact vaginal cuff with no tenderness or nodularity.     Assessment:   Cancer Staging   Malignant neoplasm of endometrium (HCC)  Staging form: Corpus Uteri - Carcinoma And Carcinosarcoma, AJCC 8th Edition  - Clinical stage from 10/25/2023: FIGO Stage IIIA, calculated as Stage IIIC1 (cT3a, cN1a, cM0) - Signed by Alexi Allred MD on 11/1/2023    Post Operative Changes:  Stable    Discussed final pathology results with the patient and her daughter in law present, and all questions were answered to their satisfaction.    Plan:  NCCN guidelines: FIGO stage IIIC1 high-grade serous carcinoma of the endometrium.      Continue on treatment with chemotherapy and immunotherapy (platin/taxol and dostarlimab) followed by possible radiation therapy    Proceed to infusion center for IV hydration directly from today's appointment     Follow Up Instructions: Return to clinic in 8-10 weeks for follow up    Electronically signed by Kaylynn Gupta PA-C on 1/5/2024 at 9:53 AM

## 2024-01-05 NOTE — FLOWSHEET NOTE
Pt arrives from GYN office after surgery check up. Her PA orders for her to have IV hydration due to elevated heart rate. Pt states she feels fine and seems unsure why she needs fluids. States she is hydrating well at home and denies any diarrhea or vomiting. States she will take the hydration that PA ordered.

## 2024-01-10 ENCOUNTER — OFFICE VISIT (OUTPATIENT)
Dept: ONCOLOGY | Age: 74
End: 2024-01-10
Payer: MEDICARE

## 2024-01-10 ENCOUNTER — TELEPHONE (OUTPATIENT)
Dept: ONCOLOGY | Age: 74
End: 2024-01-10

## 2024-01-10 VITALS
WEIGHT: 171 LBS | BODY MASS INDEX: 27.48 KG/M2 | DIASTOLIC BLOOD PRESSURE: 56 MMHG | SYSTOLIC BLOOD PRESSURE: 143 MMHG | TEMPERATURE: 97.1 F | RESPIRATION RATE: 18 BRPM | HEIGHT: 66 IN | HEART RATE: 123 BPM

## 2024-01-10 DIAGNOSIS — C54.1 MALIGNANT NEOPLASM OF ENDOMETRIUM (HCC): Primary | ICD-10-CM

## 2024-01-10 DIAGNOSIS — C54.9 SEROUS CARCINOMA OF BODY OF UTERUS (HCC): ICD-10-CM

## 2024-01-10 PROCEDURE — 1123F ACP DISCUSS/DSCN MKR DOCD: CPT | Performed by: INTERNAL MEDICINE

## 2024-01-10 PROCEDURE — 99214 OFFICE O/P EST MOD 30 MIN: CPT | Performed by: INTERNAL MEDICINE

## 2024-01-10 RX ORDER — SODIUM CHLORIDE 9 MG/ML
INJECTION, SOLUTION INTRAVENOUS CONTINUOUS
OUTPATIENT
Start: 2024-02-06

## 2024-01-10 RX ORDER — ALBUTEROL SULFATE 90 UG/1
4 AEROSOL, METERED RESPIRATORY (INHALATION) PRN
Status: CANCELLED | OUTPATIENT
Start: 2024-01-16

## 2024-01-10 RX ORDER — HEPARIN SODIUM (PORCINE) LOCK FLUSH IV SOLN 100 UNIT/ML 100 UNIT/ML
500 SOLUTION INTRAVENOUS PRN
OUTPATIENT
Start: 2024-02-06

## 2024-01-10 RX ORDER — DIPHENHYDRAMINE HYDROCHLORIDE 50 MG/ML
50 INJECTION INTRAMUSCULAR; INTRAVENOUS
OUTPATIENT
Start: 2024-02-06

## 2024-01-10 RX ORDER — EPINEPHRINE 1 MG/ML
0.3 INJECTION, SOLUTION, CONCENTRATE INTRAVENOUS PRN
Status: CANCELLED | OUTPATIENT
Start: 2024-01-16

## 2024-01-10 RX ORDER — SODIUM CHLORIDE 0.9 % (FLUSH) 0.9 %
5-40 SYRINGE (ML) INJECTION PRN
Status: CANCELLED | OUTPATIENT
Start: 2024-01-16

## 2024-01-10 RX ORDER — MEPERIDINE HYDROCHLORIDE 50 MG/ML
12.5 INJECTION INTRAMUSCULAR; INTRAVENOUS; SUBCUTANEOUS PRN
Status: CANCELLED | OUTPATIENT
Start: 2024-01-16

## 2024-01-10 RX ORDER — FAMOTIDINE 10 MG/ML
20 INJECTION, SOLUTION INTRAVENOUS
Status: CANCELLED | OUTPATIENT
Start: 2024-01-16

## 2024-01-10 RX ORDER — SODIUM CHLORIDE 9 MG/ML
INJECTION, SOLUTION INTRAVENOUS CONTINUOUS
Status: CANCELLED | OUTPATIENT
Start: 2024-01-16

## 2024-01-10 RX ORDER — PALONOSETRON 0.05 MG/ML
0.25 INJECTION, SOLUTION INTRAVENOUS ONCE
OUTPATIENT
Start: 2024-02-06 | End: 2024-02-06

## 2024-01-10 RX ORDER — SODIUM CHLORIDE 0.9 % (FLUSH) 0.9 %
5-40 SYRINGE (ML) INJECTION PRN
OUTPATIENT
Start: 2024-02-06

## 2024-01-10 RX ORDER — HEPARIN SODIUM (PORCINE) LOCK FLUSH IV SOLN 100 UNIT/ML 100 UNIT/ML
500 SOLUTION INTRAVENOUS PRN
Status: CANCELLED | OUTPATIENT
Start: 2024-01-16

## 2024-01-10 RX ORDER — SODIUM CHLORIDE 9 MG/ML
5-250 INJECTION, SOLUTION INTRAVENOUS PRN
Status: CANCELLED | OUTPATIENT
Start: 2024-01-16

## 2024-01-10 RX ORDER — PALONOSETRON 0.05 MG/ML
0.25 INJECTION, SOLUTION INTRAVENOUS ONCE
Status: CANCELLED | OUTPATIENT
Start: 2024-01-16 | End: 2024-01-16

## 2024-01-10 RX ORDER — SODIUM CHLORIDE 9 MG/ML
5-250 INJECTION, SOLUTION INTRAVENOUS PRN
OUTPATIENT
Start: 2024-02-06

## 2024-01-10 RX ORDER — ONDANSETRON 2 MG/ML
8 INJECTION INTRAMUSCULAR; INTRAVENOUS
Status: CANCELLED | OUTPATIENT
Start: 2024-01-16

## 2024-01-10 RX ORDER — DIPHENHYDRAMINE HYDROCHLORIDE 50 MG/ML
50 INJECTION INTRAMUSCULAR; INTRAVENOUS
Status: CANCELLED | OUTPATIENT
Start: 2024-01-16

## 2024-01-10 RX ORDER — EPINEPHRINE 1 MG/ML
0.3 INJECTION, SOLUTION, CONCENTRATE INTRAVENOUS PRN
OUTPATIENT
Start: 2024-02-06

## 2024-01-10 RX ORDER — FAMOTIDINE 10 MG/ML
20 INJECTION, SOLUTION INTRAVENOUS
OUTPATIENT
Start: 2024-02-06

## 2024-01-10 RX ORDER — ACETAMINOPHEN 325 MG/1
650 TABLET ORAL
OUTPATIENT
Start: 2024-02-06

## 2024-01-10 RX ORDER — DIPHENHYDRAMINE HYDROCHLORIDE 50 MG/ML
50 INJECTION INTRAMUSCULAR; INTRAVENOUS ONCE
Status: CANCELLED | OUTPATIENT
Start: 2024-01-16 | End: 2024-01-16

## 2024-01-10 RX ORDER — DIPHENHYDRAMINE HYDROCHLORIDE 50 MG/ML
50 INJECTION INTRAMUSCULAR; INTRAVENOUS ONCE
OUTPATIENT
Start: 2024-02-06 | End: 2024-02-06

## 2024-01-10 RX ORDER — MEPERIDINE HYDROCHLORIDE 50 MG/ML
12.5 INJECTION INTRAMUSCULAR; INTRAVENOUS; SUBCUTANEOUS PRN
OUTPATIENT
Start: 2024-02-06

## 2024-01-10 RX ORDER — FAMOTIDINE 10 MG/ML
20 INJECTION, SOLUTION INTRAVENOUS ONCE
OUTPATIENT
Start: 2024-02-06 | End: 2024-02-06

## 2024-01-10 RX ORDER — ACETAMINOPHEN 325 MG/1
650 TABLET ORAL
Status: CANCELLED | OUTPATIENT
Start: 2024-01-16

## 2024-01-10 RX ORDER — ALBUTEROL SULFATE 90 UG/1
4 AEROSOL, METERED RESPIRATORY (INHALATION) PRN
OUTPATIENT
Start: 2024-02-06

## 2024-01-10 RX ORDER — ONDANSETRON 2 MG/ML
8 INJECTION INTRAMUSCULAR; INTRAVENOUS
OUTPATIENT
Start: 2024-02-06

## 2024-01-10 RX ORDER — FAMOTIDINE 10 MG/ML
20 INJECTION, SOLUTION INTRAVENOUS ONCE
Status: CANCELLED | OUTPATIENT
Start: 2024-01-16 | End: 2024-01-16

## 2024-01-10 NOTE — TELEPHONE ENCOUNTER
Name: Renetta Malcolm  : 1950  MRN: [id number]    Oncology Navigation Follow-Up Note    Contact Type:  Medical Oncology    Notes: Writer met with Renetta in clinic.  Renetta states she feels pretty good other than some days feeling really tired. Renetta denies needs from navigation at this time.  Encouraged Renetta to call with questions, or needs.  Will continue to follow.      Electronically signed by Estella Zhao RN on 1/10/2024 at 3:47 PM

## 2024-01-16 ENCOUNTER — HOSPITAL ENCOUNTER (OUTPATIENT)
Dept: INFUSION THERAPY | Age: 74
Discharge: HOME | End: 2024-01-16
Payer: MEDICARE

## 2024-01-16 VITALS
TEMPERATURE: 96.5 F | RESPIRATION RATE: 16 BRPM | WEIGHT: 168 LBS | HEART RATE: 101 BPM | HEIGHT: 65 IN | BODY MASS INDEX: 27.99 KG/M2 | DIASTOLIC BLOOD PRESSURE: 85 MMHG | SYSTOLIC BLOOD PRESSURE: 123 MMHG

## 2024-01-16 DIAGNOSIS — C54.9 SEROUS CARCINOMA OF BODY OF UTERUS (HCC): Primary | ICD-10-CM

## 2024-01-16 LAB
ALBUMIN SERPL-MCNC: 3.8 G/DL (ref 3.5–5.2)
ALBUMIN/GLOB SERPL: 1.1 {RATIO} (ref 1–2.5)
ALP SERPL-CCNC: 86 U/L (ref 35–104)
ALT SERPL-CCNC: 16 U/L (ref 5–33)
ANION GAP SERPL CALCULATED.3IONS-SCNC: 17 MMOL/L (ref 9–17)
AST SERPL-CCNC: 15 U/L
BASOPHILS # BLD: <0.03 K/UL (ref 0–0.2)
BASOPHILS NFR BLD: 0 % (ref 0–2)
BILIRUB SERPL-MCNC: 0.5 MG/DL (ref 0.3–1.2)
BUN SERPL-MCNC: 16 MG/DL (ref 8–23)
BUN/CREAT SERPL: 16 (ref 9–20)
CALCIUM SERPL-MCNC: 9.5 MG/DL (ref 8.6–10.4)
CANCER AG125 SERPL-ACNC: 12 U/ML
CHLORIDE SERPL-SCNC: 98 MMOL/L (ref 98–107)
CO2 SERPL-SCNC: 22 MMOL/L (ref 20–31)
CREAT SERPL-MCNC: 1 MG/DL (ref 0.5–0.9)
EOSINOPHIL # BLD: <0.03 K/UL (ref 0–0.44)
EOSINOPHILS RELATIVE PERCENT: 0 % (ref 1–4)
ERYTHROCYTE [DISTWIDTH] IN BLOOD BY AUTOMATED COUNT: 16.9 % (ref 11.8–14.4)
GFR SERPL CREATININE-BSD FRML MDRD: 60 ML/MIN/1.73M2
GLUCOSE SERPL-MCNC: 220 MG/DL (ref 70–99)
HCT VFR BLD AUTO: 32.7 % (ref 36.3–47.1)
HGB BLD-MCNC: 9.8 G/DL (ref 11.9–15.1)
IMM GRANULOCYTES # BLD AUTO: <0.03 K/UL (ref 0–0.3)
IMM GRANULOCYTES NFR BLD: 0 %
LYMPHOCYTES NFR BLD: 0.87 K/UL (ref 1.1–3.7)
LYMPHOCYTES RELATIVE PERCENT: 11 % (ref 24–43)
MCH RBC QN AUTO: 26.6 PG (ref 25.2–33.5)
MCHC RBC AUTO-ENTMCNC: 30 G/DL (ref 28.4–34.8)
MCV RBC AUTO: 88.9 FL (ref 82.6–102.9)
MONOCYTES NFR BLD: 0.06 K/UL (ref 0.1–1.2)
MONOCYTES NFR BLD: 1 % (ref 3–12)
NEUTROPHILS NFR BLD: 88 % (ref 36–65)
NEUTS SEG NFR BLD: 6.93 K/UL (ref 1.5–8.1)
NRBC BLD-RTO: 0 PER 100 WBC
PLATELET # BLD AUTO: 361 K/UL (ref 138–453)
PMV BLD AUTO: 10.6 FL (ref 8.1–13.5)
POTASSIUM SERPL-SCNC: 4.3 MMOL/L (ref 3.7–5.3)
PROT SERPL-MCNC: 7.4 G/DL (ref 6.4–8.3)
RBC # BLD AUTO: 3.68 M/UL (ref 3.95–5.11)
SODIUM SERPL-SCNC: 137 MMOL/L (ref 135–144)
WBC OTHER # BLD: 7.9 K/UL (ref 3.5–11.3)

## 2024-01-16 PROCEDURE — 80053 COMPREHEN METABOLIC PANEL: CPT

## 2024-01-16 PROCEDURE — 96415 CHEMO IV INFUSION ADDL HR: CPT

## 2024-01-16 PROCEDURE — 2580000003 HC RX 258: Performed by: INTERNAL MEDICINE

## 2024-01-16 PROCEDURE — 96375 TX/PRO/DX INJ NEW DRUG ADDON: CPT

## 2024-01-16 PROCEDURE — 86304 IMMUNOASSAY TUMOR CA 125: CPT

## 2024-01-16 PROCEDURE — 96413 CHEMO IV INFUSION 1 HR: CPT

## 2024-01-16 PROCEDURE — 36591 DRAW BLOOD OFF VENOUS DEVICE: CPT

## 2024-01-16 PROCEDURE — 85025 COMPLETE CBC W/AUTO DIFF WBC: CPT

## 2024-01-16 PROCEDURE — 2500000003 HC RX 250 WO HCPCS: Performed by: INTERNAL MEDICINE

## 2024-01-16 PROCEDURE — 6360000002 HC RX W HCPCS: Performed by: INTERNAL MEDICINE

## 2024-01-16 PROCEDURE — 96417 CHEMO IV INFUS EACH ADDL SEQ: CPT

## 2024-01-16 PROCEDURE — 96367 TX/PROPH/DG ADDL SEQ IV INF: CPT

## 2024-01-16 RX ORDER — HEPARIN 100 UNIT/ML
500 SYRINGE INTRAVENOUS PRN
Status: DISCONTINUED | OUTPATIENT
Start: 2024-01-16 | End: 2024-01-17 | Stop reason: HOSPADM

## 2024-01-16 RX ORDER — SODIUM CHLORIDE 9 MG/ML
5-250 INJECTION, SOLUTION INTRAVENOUS PRN
Status: DISCONTINUED | OUTPATIENT
Start: 2024-01-16 | End: 2024-01-17 | Stop reason: HOSPADM

## 2024-01-16 RX ORDER — DEXAMETHASONE SODIUM PHOSPHATE 10 MG/ML
10 INJECTION, SOLUTION INTRAMUSCULAR; INTRAVENOUS ONCE
Status: COMPLETED | OUTPATIENT
Start: 2024-01-16 | End: 2024-01-16

## 2024-01-16 RX ORDER — DIPHENHYDRAMINE HYDROCHLORIDE 50 MG/ML
50 INJECTION INTRAMUSCULAR; INTRAVENOUS ONCE
Status: COMPLETED | OUTPATIENT
Start: 2024-01-16 | End: 2024-01-16

## 2024-01-16 RX ORDER — SODIUM CHLORIDE 0.9 % (FLUSH) 0.9 %
5-40 SYRINGE (ML) INJECTION PRN
Status: DISCONTINUED | OUTPATIENT
Start: 2024-01-16 | End: 2024-01-17 | Stop reason: HOSPADM

## 2024-01-16 RX ORDER — PALONOSETRON 0.05 MG/ML
0.25 INJECTION, SOLUTION INTRAVENOUS ONCE
Status: COMPLETED | OUTPATIENT
Start: 2024-01-16 | End: 2024-01-16

## 2024-01-16 RX ORDER — FAMOTIDINE 10 MG/ML
20 INJECTION, SOLUTION INTRAVENOUS ONCE
Status: COMPLETED | OUTPATIENT
Start: 2024-01-16 | End: 2024-01-16

## 2024-01-16 RX ADMIN — SODIUM CHLORIDE 200 ML/HR: 9 INJECTION, SOLUTION INTRAVENOUS at 08:41

## 2024-01-16 RX ADMIN — CARBOPLATIN 385 MG: 10 INJECTION, SOLUTION INTRAVENOUS at 13:33

## 2024-01-16 RX ADMIN — SODIUM CHLORIDE 500 MG: 9 INJECTION, SOLUTION INTRAVENOUS at 09:37

## 2024-01-16 RX ADMIN — SODIUM CHLORIDE 336 MG: 9 INJECTION, SOLUTION INTRAVENOUS at 10:17

## 2024-01-16 RX ADMIN — HEPARIN 500 UNITS: 100 SYRINGE at 14:06

## 2024-01-16 RX ADMIN — SODIUM CHLORIDE, PRESERVATIVE FREE 10 ML: 5 INJECTION INTRAVENOUS at 08:13

## 2024-01-16 RX ADMIN — SODIUM CHLORIDE, PRESERVATIVE FREE 10 ML: 5 INJECTION INTRAVENOUS at 14:06

## 2024-01-16 RX ADMIN — PALONOSETRON 0.25 MG: 0.05 INJECTION, SOLUTION INTRAVENOUS at 08:43

## 2024-01-16 RX ADMIN — SODIUM CHLORIDE, PRESERVATIVE FREE 10 ML: 5 INJECTION INTRAVENOUS at 14:05

## 2024-01-16 RX ADMIN — FOSAPREPITANT 150 MG: 150 INJECTION, POWDER, LYOPHILIZED, FOR SOLUTION INTRAVENOUS at 09:01

## 2024-01-16 RX ADMIN — FAMOTIDINE 20 MG: 10 INJECTION, SOLUTION INTRAVENOUS at 08:46

## 2024-01-16 RX ADMIN — SODIUM CHLORIDE, PRESERVATIVE FREE 10 ML: 5 INJECTION INTRAVENOUS at 08:12

## 2024-01-16 RX ADMIN — SODIUM CHLORIDE, PRESERVATIVE FREE 10 ML: 5 INJECTION INTRAVENOUS at 08:46

## 2024-01-16 RX ADMIN — DIPHENHYDRAMINE HYDROCHLORIDE 50 MG: 50 INJECTION INTRAMUSCULAR; INTRAVENOUS at 08:55

## 2024-01-16 RX ADMIN — DEXAMETHASONE SODIUM PHOSPHATE 10 MG: 10 INJECTION, SOLUTION INTRAMUSCULAR; INTRAVENOUS at 08:50

## 2024-01-16 NOTE — PLAN OF CARE
Problem: Chronic Conditions and Co-morbidities  Goal: Patient's chronic conditions and co-morbidity symptoms are monitored and maintained or improved  Outcome: Adequate for Discharge     Problem: Safety - Adult  Goal: Absence of infection signs and symptoms  Outcome: Adequate for Discharge

## 2024-01-17 NOTE — PROGRESS NOTES
frequency, urgency or incontinence.    Neurological: Denies headaches, decreased LOC, no sensory or motor focal deficits.   Musculoskeletal:  No arthralgia no back pain or joint swelling.   Skin: There are no rashes or bleeding.  Psychiatric:  No anxiety, no depression.   Endocrine: no diabetes or thyroid disease.   Hematologic: no bleeding , no adenopathy.    PHYSICAL EXAM: Shows a well appearing 73 y.o.-year-old female who is not in pain or distress. Vital Signs: Blood pressure (!) 143/56, pulse (!) 123, temperature 97.1 °F (36.2 °C), temperature source Temporal, resp. rate 18, height 1.676 m (5' 6\"), weight 77.6 kg (171 lb). HEENT: Normocephalic and atraumatic. Pupils are equal, round, reactive to light and accommodation. Extraocular muscles are intact. Neck: Showed no JVD, no carotid bruit . Lungs: Clear to auscultation bilaterally. Heart: Regular without any murmur. Abdomen: Soft, nontender. No hepatosplenomegaly. Extremities: Lower extremities show no edema, clubbing, or cyanosis. Breasts: Examination not done today. Neuro exam: intact cranial nerves bilaterally no motor or sensory deficit, gait is normal. Lymphatic: no adenopathy appreciated in the supraclavicular, axillary, cervical or inguinal area    REVIEW OF LABORATORY DATA:   Lab Results   Component Value Date    WBC 7.9 01/16/2024    HGB 9.8 (L) 01/16/2024    HCT 32.7 (L) 01/16/2024    MCV 88.9 01/16/2024     01/16/2024       Chemistry        Component Value Date/Time     01/16/2024 0812    K 4.3 01/16/2024 0812    CL 98 01/16/2024 0812    CO2 22 01/16/2024 0812    BUN 16 01/16/2024 0812    CREATININE 1.0 (H) 01/16/2024 0812        Component Value Date/Time    CALCIUM 9.5 01/16/2024 0812    ALKPHOS 86 01/16/2024 0812    AST 15 01/16/2024 0812    ALT 16 01/16/2024 0812    BILITOT 0.5 01/16/2024 0812        Lab Results   Component Value Date     12 01/16/2024       Path Number: [id number]     -- Diagnosis --   A.  Gracey lymph nodes,

## 2024-01-31 ENCOUNTER — OFFICE VISIT (OUTPATIENT)
Dept: ONCOLOGY | Age: 74
End: 2024-01-31
Payer: MEDICARE

## 2024-01-31 ENCOUNTER — TELEPHONE (OUTPATIENT)
Dept: ONCOLOGY | Age: 74
End: 2024-01-31

## 2024-01-31 VITALS
TEMPERATURE: 96.6 F | DIASTOLIC BLOOD PRESSURE: 76 MMHG | WEIGHT: 164 LBS | BODY MASS INDEX: 27.32 KG/M2 | RESPIRATION RATE: 18 BRPM | HEART RATE: 122 BPM | SYSTOLIC BLOOD PRESSURE: 144 MMHG | HEIGHT: 65 IN

## 2024-01-31 DIAGNOSIS — C54.9 SEROUS CARCINOMA OF BODY OF UTERUS (HCC): ICD-10-CM

## 2024-01-31 DIAGNOSIS — C54.1 MALIGNANT NEOPLASM OF ENDOMETRIUM (HCC): Primary | ICD-10-CM

## 2024-01-31 PROCEDURE — 99214 OFFICE O/P EST MOD 30 MIN: CPT | Performed by: INTERNAL MEDICINE

## 2024-01-31 PROCEDURE — 1123F ACP DISCUSS/DSCN MKR DOCD: CPT | Performed by: INTERNAL MEDICINE

## 2024-01-31 RX ORDER — FAMOTIDINE 10 MG/ML
20 INJECTION, SOLUTION INTRAVENOUS ONCE
OUTPATIENT
Start: 2024-03-19 | End: 2024-03-19

## 2024-01-31 RX ORDER — HEPARIN SODIUM (PORCINE) LOCK FLUSH IV SOLN 100 UNIT/ML 100 UNIT/ML
500 SOLUTION INTRAVENOUS PRN
OUTPATIENT
Start: 2024-03-19

## 2024-01-31 RX ORDER — ACETAMINOPHEN 325 MG/1
650 TABLET ORAL
OUTPATIENT
Start: 2024-03-19

## 2024-01-31 RX ORDER — FAMOTIDINE 10 MG/ML
20 INJECTION, SOLUTION INTRAVENOUS
OUTPATIENT
Start: 2024-02-27

## 2024-01-31 RX ORDER — EPINEPHRINE 1 MG/ML
0.3 INJECTION, SOLUTION, CONCENTRATE INTRAVENOUS PRN
OUTPATIENT
Start: 2024-03-19

## 2024-01-31 RX ORDER — PALONOSETRON 0.05 MG/ML
0.25 INJECTION, SOLUTION INTRAVENOUS ONCE
OUTPATIENT
Start: 2024-02-27 | End: 2024-02-27

## 2024-01-31 RX ORDER — ACETAMINOPHEN 325 MG/1
650 TABLET ORAL
OUTPATIENT
Start: 2024-02-27

## 2024-01-31 RX ORDER — MEPERIDINE HYDROCHLORIDE 50 MG/ML
12.5 INJECTION INTRAMUSCULAR; INTRAVENOUS; SUBCUTANEOUS PRN
OUTPATIENT
Start: 2024-03-19

## 2024-01-31 RX ORDER — SODIUM CHLORIDE 9 MG/ML
5-250 INJECTION, SOLUTION INTRAVENOUS PRN
OUTPATIENT
Start: 2024-02-27

## 2024-01-31 RX ORDER — FAMOTIDINE 10 MG/ML
20 INJECTION, SOLUTION INTRAVENOUS ONCE
OUTPATIENT
Start: 2024-02-27 | End: 2024-02-27

## 2024-01-31 RX ORDER — FAMOTIDINE 10 MG/ML
20 INJECTION, SOLUTION INTRAVENOUS
OUTPATIENT
Start: 2024-03-19

## 2024-01-31 RX ORDER — SODIUM CHLORIDE 9 MG/ML
INJECTION, SOLUTION INTRAVENOUS CONTINUOUS
OUTPATIENT
Start: 2024-03-19

## 2024-01-31 RX ORDER — DIPHENHYDRAMINE HYDROCHLORIDE 50 MG/ML
50 INJECTION INTRAMUSCULAR; INTRAVENOUS
OUTPATIENT
Start: 2024-03-19

## 2024-01-31 RX ORDER — EPINEPHRINE 1 MG/ML
0.3 INJECTION, SOLUTION, CONCENTRATE INTRAVENOUS PRN
OUTPATIENT
Start: 2024-02-27

## 2024-01-31 RX ORDER — PALONOSETRON 0.05 MG/ML
0.25 INJECTION, SOLUTION INTRAVENOUS ONCE
OUTPATIENT
Start: 2024-03-19 | End: 2024-03-19

## 2024-01-31 RX ORDER — SODIUM CHLORIDE 9 MG/ML
5-250 INJECTION, SOLUTION INTRAVENOUS PRN
OUTPATIENT
Start: 2024-03-19

## 2024-01-31 RX ORDER — SODIUM CHLORIDE 9 MG/ML
INJECTION, SOLUTION INTRAVENOUS CONTINUOUS
OUTPATIENT
Start: 2024-02-27

## 2024-01-31 RX ORDER — ONDANSETRON 2 MG/ML
8 INJECTION INTRAMUSCULAR; INTRAVENOUS
OUTPATIENT
Start: 2024-03-19

## 2024-01-31 RX ORDER — ALBUTEROL SULFATE 90 UG/1
4 AEROSOL, METERED RESPIRATORY (INHALATION) PRN
OUTPATIENT
Start: 2024-03-19

## 2024-01-31 RX ORDER — DIPHENHYDRAMINE HYDROCHLORIDE 50 MG/ML
50 INJECTION INTRAMUSCULAR; INTRAVENOUS ONCE
OUTPATIENT
Start: 2024-02-27 | End: 2024-02-27

## 2024-01-31 RX ORDER — SODIUM CHLORIDE 0.9 % (FLUSH) 0.9 %
5-40 SYRINGE (ML) INJECTION PRN
OUTPATIENT
Start: 2024-02-27

## 2024-01-31 RX ORDER — ONDANSETRON 2 MG/ML
8 INJECTION INTRAMUSCULAR; INTRAVENOUS
OUTPATIENT
Start: 2024-02-27

## 2024-01-31 RX ORDER — MEPERIDINE HYDROCHLORIDE 50 MG/ML
12.5 INJECTION INTRAMUSCULAR; INTRAVENOUS; SUBCUTANEOUS PRN
OUTPATIENT
Start: 2024-02-27

## 2024-01-31 RX ORDER — DIPHENHYDRAMINE HYDROCHLORIDE 50 MG/ML
50 INJECTION INTRAMUSCULAR; INTRAVENOUS
OUTPATIENT
Start: 2024-02-27

## 2024-01-31 RX ORDER — DIPHENHYDRAMINE HYDROCHLORIDE 50 MG/ML
50 INJECTION INTRAMUSCULAR; INTRAVENOUS ONCE
OUTPATIENT
Start: 2024-03-19 | End: 2024-03-19

## 2024-01-31 RX ORDER — SODIUM CHLORIDE 0.9 % (FLUSH) 0.9 %
5-40 SYRINGE (ML) INJECTION PRN
OUTPATIENT
Start: 2024-03-19

## 2024-01-31 RX ORDER — ALBUTEROL SULFATE 90 UG/1
4 AEROSOL, METERED RESPIRATORY (INHALATION) PRN
OUTPATIENT
Start: 2024-02-27

## 2024-01-31 RX ORDER — HEPARIN SODIUM (PORCINE) LOCK FLUSH IV SOLN 100 UNIT/ML 100 UNIT/ML
500 SOLUTION INTRAVENOUS PRN
OUTPATIENT
Start: 2024-02-27

## 2024-01-31 NOTE — TELEPHONE ENCOUNTER
Name: Renetta Malcolm  : 1950  MRN: [id number]    Oncology Navigation Follow-Up Note    Contact Type:  Medical Oncology    Notes: Writer met with patient, her son and granddaughter in clinic.  Renetta states she is feeling good but had a bout of diarrhea.  Pt states she took imodium with relief.  Renetta states she did not have any issue after that. Writer present for physician visit. Renetta denies any issues with bills or needs from navigation at this time. Renetta thanked writer for answering questions.  Will continue to follow.     Electronically signed by Estella Zhao RN on 2024 at 3:54 PM

## 2024-01-31 NOTE — PROGRESS NOTES
Chief Complaint   Patient presents with    Follow-up     Uterine cancer         DIAGNOSIS:   Stage IIIA (FIGO stage IIIc1)  ([id number]) endometrial cancer, High grade serous carcinoma.   CURRENT THERAPY:  Status post radical hysterectomy, bilateral salpingo-oophorectomy and lymph node dissection 10/25/2023  chemoimmunotherapy with carboplatin, Taxol and Dostarlimab postoperatively  BRIEF CASE HISTORY:   Renetta Malcolm is a very pleasant 73 y.o. female who is referred to us for recently diagnosed endometrial cancer.  She presented with postmenopausal bleeding.  She was initially on anticoagulation and the bleeding was thought to be due to anticoagulation.  However further work-up showed an ultrasound that showed thickened endometrium.  In 2019, underwent endometrial sampling that was benign.  Because of persistent symptoms, CT scan was done in October/23 and revealed a hypodensity endometrial canal concerning for endometrial mass.  Biopsy showed serous carcinoma on 10/9/2023.  The patient was referred to gynecology oncology and ultimately underwent robotic assisted radical hysterectomy and lymph node sampling on 10/25/2023.  The tumor measured 5.4 cm and invaded through the [place] atrium to the serosal surface.  There was significant lymphovascular invasion.  Ovaries and fallopian tubes were negative.  There was metastatic carcinoma that present on the right obturator and external iliac (bilateral)  lymph nodes.  Pelvic washings were positive for non-small cell carcinoma .of note, that the right obturator node was stuck to the obturator canal and was not removed for risk of bleeding.  Paraortic lymph nodes were negative.  Pathological staging is T3 aN1 M0  Patient did well postoperatively.  She was referred to us for consideration of chemoimmunotherapy based on guidelines.  She is sent to us for a consultation, covering nicely from surgery.  Case was discussed with GYN oncology as needed that she might.  He to

## 2024-02-06 ENCOUNTER — HOSPITAL ENCOUNTER (OUTPATIENT)
Dept: INFUSION THERAPY | Age: 74
Discharge: HOME | End: 2024-02-06
Payer: MEDICARE

## 2024-02-06 VITALS
RESPIRATION RATE: 18 BRPM | SYSTOLIC BLOOD PRESSURE: 132 MMHG | BODY MASS INDEX: 27.16 KG/M2 | HEIGHT: 65 IN | WEIGHT: 163 LBS | HEART RATE: 112 BPM | DIASTOLIC BLOOD PRESSURE: 56 MMHG | TEMPERATURE: 97 F

## 2024-02-06 DIAGNOSIS — C54.9 SEROUS CARCINOMA OF BODY OF UTERUS (HCC): Primary | ICD-10-CM

## 2024-02-06 DIAGNOSIS — C77.5 ADENOCARCINOMA METASTATIC TO PELVIC LYMPH NODE (HCC): ICD-10-CM

## 2024-02-06 DIAGNOSIS — I10 HYPERTENSION, UNSPECIFIED TYPE: ICD-10-CM

## 2024-02-06 DIAGNOSIS — C54.1 MALIGNANT NEOPLASM OF ENDOMETRIUM (HCC): ICD-10-CM

## 2024-02-06 LAB
ALBUMIN SERPL-MCNC: 3.7 G/DL (ref 3.5–5.2)
ALBUMIN/GLOB SERPL: 1.1 {RATIO} (ref 1–2.5)
ALP SERPL-CCNC: 85 U/L (ref 35–104)
ALT SERPL-CCNC: 20 U/L (ref 5–33)
AMYLASE SERPL-CCNC: 56 U/L (ref 28–100)
ANION GAP SERPL CALCULATED.3IONS-SCNC: 18 MMOL/L (ref 9–17)
AST SERPL-CCNC: 15 U/L
BASOPHILS # BLD: 0 K/UL (ref 0–0.2)
BASOPHILS NFR BLD: 0 % (ref 0–2)
BILIRUB SERPL-MCNC: 0.4 MG/DL (ref 0.3–1.2)
BUN SERPL-MCNC: 12 MG/DL (ref 8–23)
BUN/CREAT SERPL: 15 (ref 9–20)
CALCIUM SERPL-MCNC: 8.9 MG/DL (ref 8.6–10.4)
CANCER AG125 SERPL-ACNC: 10 U/ML
CHLORIDE SERPL-SCNC: 100 MMOL/L (ref 98–107)
CO2 SERPL-SCNC: 20 MMOL/L (ref 20–31)
CORTIS SERPL-MCNC: 2.4 UG/DL (ref 2.7–18.4)
CREAT SERPL-MCNC: 0.8 MG/DL (ref 0.5–0.9)
EOSINOPHIL # BLD: 0 K/UL (ref 0–0.44)
EOSINOPHILS RELATIVE PERCENT: 0 % (ref 1–4)
ERYTHROCYTE [DISTWIDTH] IN BLOOD BY AUTOMATED COUNT: 19.3 % (ref 11.8–14.4)
GFR SERPL CREATININE-BSD FRML MDRD: >60 ML/MIN/1.73M2
GLUCOSE SERPL-MCNC: 183 MG/DL (ref 70–99)
HCT VFR BLD AUTO: 31.8 % (ref 36.3–47.1)
HGB BLD-MCNC: 9.2 G/DL (ref 11.9–15.1)
IMM GRANULOCYTES # BLD AUTO: 0.06 K/UL (ref 0–0.3)
IMM GRANULOCYTES NFR BLD: 1 %
LIPASE SERPL-CCNC: 31 U/L (ref 13–60)
LYMPHOCYTES NFR BLD: 0.67 K/UL (ref 1.1–3.7)
LYMPHOCYTES RELATIVE PERCENT: 12 % (ref 24–43)
MCH RBC QN AUTO: 26 PG (ref 25.2–33.5)
MCHC RBC AUTO-ENTMCNC: 28.9 G/DL (ref 28.4–34.8)
MCV RBC AUTO: 89.8 FL (ref 82.6–102.9)
MONOCYTES NFR BLD: 0.06 K/UL (ref 0.1–1.2)
MONOCYTES NFR BLD: 1 % (ref 3–12)
MORPHOLOGY: ABNORMAL
MORPHOLOGY: ABNORMAL
NEUTROPHILS NFR BLD: 86 % (ref 36–65)
NEUTS SEG NFR BLD: 4.81 K/UL (ref 1.5–8.1)
NRBC BLD-RTO: 0 PER 100 WBC
PLATELET # BLD AUTO: 287 K/UL (ref 138–453)
PMV BLD AUTO: 8.9 FL (ref 8.1–13.5)
POTASSIUM SERPL-SCNC: 4.2 MMOL/L (ref 3.7–5.3)
PROT SERPL-MCNC: 7.2 G/DL (ref 6.4–8.3)
RBC # BLD AUTO: 3.54 M/UL (ref 3.95–5.11)
SODIUM SERPL-SCNC: 138 MMOL/L (ref 135–144)
TSH SERPL DL<=0.05 MIU/L-ACNC: 1.14 UIU/ML (ref 0.3–5)
WBC OTHER # BLD: 5.6 K/UL (ref 3.5–11.3)

## 2024-02-06 PROCEDURE — 84443 ASSAY THYROID STIM HORMONE: CPT

## 2024-02-06 PROCEDURE — 6360000002 HC RX W HCPCS: Performed by: INTERNAL MEDICINE

## 2024-02-06 PROCEDURE — 2500000003 HC RX 250 WO HCPCS: Performed by: INTERNAL MEDICINE

## 2024-02-06 PROCEDURE — 85025 COMPLETE CBC W/AUTO DIFF WBC: CPT

## 2024-02-06 PROCEDURE — 96375 TX/PRO/DX INJ NEW DRUG ADDON: CPT

## 2024-02-06 PROCEDURE — 83690 ASSAY OF LIPASE: CPT

## 2024-02-06 PROCEDURE — 96413 CHEMO IV INFUSION 1 HR: CPT

## 2024-02-06 PROCEDURE — 82533 TOTAL CORTISOL: CPT

## 2024-02-06 PROCEDURE — 96415 CHEMO IV INFUSION ADDL HR: CPT

## 2024-02-06 PROCEDURE — 2580000003 HC RX 258: Performed by: INTERNAL MEDICINE

## 2024-02-06 PROCEDURE — 80053 COMPREHEN METABOLIC PANEL: CPT

## 2024-02-06 PROCEDURE — 96417 CHEMO IV INFUS EACH ADDL SEQ: CPT

## 2024-02-06 PROCEDURE — 86304 IMMUNOASSAY TUMOR CA 125: CPT

## 2024-02-06 PROCEDURE — 96366 THER/PROPH/DIAG IV INF ADDON: CPT

## 2024-02-06 PROCEDURE — 82150 ASSAY OF AMYLASE: CPT

## 2024-02-06 RX ORDER — FAMOTIDINE 10 MG/ML
20 INJECTION, SOLUTION INTRAVENOUS ONCE
Status: COMPLETED | OUTPATIENT
Start: 2024-02-06 | End: 2024-02-06

## 2024-02-06 RX ORDER — SODIUM CHLORIDE 9 MG/ML
5-250 INJECTION, SOLUTION INTRAVENOUS PRN
Status: DISCONTINUED | OUTPATIENT
Start: 2024-02-06 | End: 2024-02-07 | Stop reason: HOSPADM

## 2024-02-06 RX ORDER — DEXAMETHASONE SODIUM PHOSPHATE 10 MG/ML
10 INJECTION, SOLUTION INTRAMUSCULAR; INTRAVENOUS ONCE
Status: COMPLETED | OUTPATIENT
Start: 2024-02-06 | End: 2024-02-06

## 2024-02-06 RX ORDER — HEPARIN 100 UNIT/ML
500 SYRINGE INTRAVENOUS PRN
Status: DISCONTINUED | OUTPATIENT
Start: 2024-02-06 | End: 2024-02-07 | Stop reason: HOSPADM

## 2024-02-06 RX ORDER — SODIUM CHLORIDE 0.9 % (FLUSH) 0.9 %
5-40 SYRINGE (ML) INJECTION PRN
Status: DISCONTINUED | OUTPATIENT
Start: 2024-02-06 | End: 2024-02-07 | Stop reason: HOSPADM

## 2024-02-06 RX ORDER — DIPHENHYDRAMINE HYDROCHLORIDE 50 MG/ML
50 INJECTION INTRAMUSCULAR; INTRAVENOUS ONCE
Status: COMPLETED | OUTPATIENT
Start: 2024-02-06 | End: 2024-02-06

## 2024-02-06 RX ORDER — PALONOSETRON 0.05 MG/ML
0.25 INJECTION, SOLUTION INTRAVENOUS ONCE
Status: COMPLETED | OUTPATIENT
Start: 2024-02-06 | End: 2024-02-06

## 2024-02-06 RX ADMIN — HEPARIN 500 UNITS: 100 SYRINGE at 14:27

## 2024-02-06 RX ADMIN — SODIUM CHLORIDE, PRESERVATIVE FREE 10 ML: 5 INJECTION INTRAVENOUS at 08:15

## 2024-02-06 RX ADMIN — SODIUM CHLORIDE 500 MG: 9 INJECTION, SOLUTION INTRAVENOUS at 10:01

## 2024-02-06 RX ADMIN — FOSAPREPITANT 150 MG: 150 INJECTION, POWDER, LYOPHILIZED, FOR SOLUTION INTRAVENOUS at 09:37

## 2024-02-06 RX ADMIN — CARBOPLATIN 445 MG: 10 INJECTION, SOLUTION INTRAVENOUS at 13:48

## 2024-02-06 RX ADMIN — DEXAMETHASONE SODIUM PHOSPHATE 10 MG: 10 INJECTION INTRAMUSCULAR; INTRAVENOUS at 09:33

## 2024-02-06 RX ADMIN — SODIUM CHLORIDE, PRESERVATIVE FREE 10 ML: 5 INJECTION INTRAVENOUS at 14:26

## 2024-02-06 RX ADMIN — FAMOTIDINE 20 MG: 10 INJECTION, SOLUTION INTRAVENOUS at 09:29

## 2024-02-06 RX ADMIN — PALONOSETRON 0.25 MG: 0.05 INJECTION, SOLUTION INTRAVENOUS at 09:24

## 2024-02-06 RX ADMIN — SODIUM CHLORIDE 200 ML/HR: 9 INJECTION, SOLUTION INTRAVENOUS at 09:14

## 2024-02-06 RX ADMIN — SODIUM CHLORIDE, PRESERVATIVE FREE 10 ML: 5 INJECTION INTRAVENOUS at 14:27

## 2024-02-06 RX ADMIN — SODIUM CHLORIDE, PRESERVATIVE FREE 10 ML: 5 INJECTION INTRAVENOUS at 08:16

## 2024-02-06 RX ADMIN — PACLITAXEL 336 MG: 6 INJECTION, SOLUTION, CONCENTRATE INTRAVENOUS at 10:35

## 2024-02-06 RX ADMIN — DIPHENHYDRAMINE HYDROCHLORIDE 50 MG: 50 INJECTION INTRAMUSCULAR; INTRAVENOUS at 09:26

## 2024-02-06 NOTE — PROGRESS NOTES
A Lenghty good Follow up visit.  Pt. Shares readily about her family members and her  .  The Regret today expressed was not being able to celebrate 50 th Wedding Anniversary with her .  Grief work offered and active listening to her stories.  Prayers said with her at the end of the visit.  An assurance of prayers given.  Continue to visit and to support.    Sister Yvonne Cuba, OSF/T  [facility]

## 2024-02-15 ENCOUNTER — HOSPITAL ENCOUNTER (INPATIENT)
Age: 74
LOS: 5 days | Discharge: HOME HEALTH | DRG: 178 | End: 2024-02-20
Attending: EMERGENCY MEDICINE | Admitting: STUDENT IN AN ORGANIZED HEALTH CARE EDUCATION/TRAINING PROGRAM
Payer: MEDICARE

## 2024-02-15 ENCOUNTER — APPOINTMENT (OUTPATIENT)
Dept: GENERAL RADIOLOGY | Age: 74
DRG: 178 | End: 2024-02-15
Payer: MEDICARE

## 2024-02-15 DIAGNOSIS — T45.1X5A ANEMIA DUE TO ANTINEOPLASTIC CHEMOTHERAPY: ICD-10-CM

## 2024-02-15 DIAGNOSIS — D64.81 ANEMIA DUE TO ANTINEOPLASTIC CHEMOTHERAPY: ICD-10-CM

## 2024-02-15 DIAGNOSIS — E87.6 HYPOKALEMIA: ICD-10-CM

## 2024-02-15 DIAGNOSIS — U07.1 COVID-19 VIRUS INFECTION: Primary | ICD-10-CM

## 2024-02-15 PROBLEM — K52.1 CHEMOTHERAPY INDUCED DIARRHEA: Status: ACTIVE | Noted: 2024-02-15

## 2024-02-15 PROBLEM — K59.1 FUNCTIONAL DIARRHEA: Status: ACTIVE | Noted: 2024-02-15

## 2024-02-15 PROBLEM — D70.1 CHEMOTHERAPY INDUCED NEUTROPENIA (HCC): Status: ACTIVE | Noted: 2024-02-15

## 2024-02-15 LAB
ALBUMIN SERPL-MCNC: 3.3 G/DL (ref 3.5–5.2)
ALBUMIN/GLOB SERPL: 0.9 {RATIO} (ref 1–2.5)
ALP SERPL-CCNC: 93 U/L (ref 35–104)
ALT SERPL-CCNC: 24 U/L (ref 5–33)
ANION GAP SERPL CALCULATED.3IONS-SCNC: 15 MMOL/L (ref 9–17)
AST SERPL-CCNC: 20 U/L
BASOPHILS # BLD: 0.02 K/UL (ref 0–0.2)
BASOPHILS NFR BLD: 1 % (ref 0–2)
BILIRUB SERPL-MCNC: 0.8 MG/DL (ref 0.3–1.2)
BILIRUB UR QL STRIP: NEGATIVE
BUN SERPL-MCNC: 14 MG/DL (ref 8–23)
BUN/CREAT SERPL: 18 (ref 9–20)
CALCIUM SERPL-MCNC: 8.1 MG/DL (ref 8.6–10.4)
CHLORIDE SERPL-SCNC: 95 MMOL/L (ref 98–107)
CLARITY UR: CLEAR
CO2 SERPL-SCNC: 23 MMOL/L (ref 20–31)
COLOR UR: YELLOW
CREAT SERPL-MCNC: 0.8 MG/DL (ref 0.5–0.9)
CRP SERPL HS-MCNC: 262.4 MG/L (ref 0–5)
EKG ATRIAL RATE: 121 BPM
EKG P AXIS: 11 DEGREES
EKG P-R INTERVAL: 124 MS
EKG Q-T INTERVAL: 324 MS
EKG QRS DURATION: 76 MS
EKG QTC CALCULATION (BAZETT): 460 MS
EKG R AXIS: 22 DEGREES
EKG T AXIS: 26 DEGREES
EKG VENTRICULAR RATE: 121 BPM
EOSINOPHIL # BLD: 0 K/UL (ref 0–0.44)
EOSINOPHILS RELATIVE PERCENT: 0 % (ref 1–4)
ERYTHROCYTE [DISTWIDTH] IN BLOOD BY AUTOMATED COUNT: 20.4 % (ref 11.8–14.4)
FLUAV AG SPEC QL: NEGATIVE
FLUBV AG SPEC QL: NEGATIVE
GFR SERPL CREATININE-BSD FRML MDRD: >60 ML/MIN/1.73M2
GLUCOSE SERPL-MCNC: 154 MG/DL (ref 70–99)
GLUCOSE UR STRIP-MCNC: NEGATIVE MG/DL
HCT VFR BLD AUTO: 20.8 % (ref 36.3–47.1)
HCT VFR BLD AUTO: 23 % (ref 36.3–47.1)
HCT VFR BLD AUTO: 24.6 % (ref 36.3–47.1)
HGB BLD-MCNC: 6.3 G/DL (ref 11.9–15.1)
HGB BLD-MCNC: 7 G/DL (ref 11.9–15.1)
HGB BLD-MCNC: 7.6 G/DL (ref 11.9–15.1)
HGB UR QL STRIP.AUTO: NEGATIVE
IMM GRANULOCYTES # BLD AUTO: 0.05 K/UL (ref 0–0.3)
IMM GRANULOCYTES NFR BLD: 3 %
KETONES UR STRIP-MCNC: NEGATIVE MG/DL
LACTATE BLDV-SCNC: 2 MMOL/L (ref 0.5–2.2)
LEUKOCYTE ESTERASE UR QL STRIP: NEGATIVE
LYMPHOCYTES NFR BLD: 0.85 K/UL (ref 1.1–3.7)
LYMPHOCYTES RELATIVE PERCENT: 51 % (ref 24–43)
MAGNESIUM SERPL-MCNC: 1.5 MG/DL (ref 1.6–2.6)
MCH RBC QN AUTO: 26.6 PG (ref 25.2–33.5)
MCHC RBC AUTO-ENTMCNC: 30.9 G/DL (ref 28.4–34.8)
MCV RBC AUTO: 86 FL (ref 82.6–102.9)
MONOCYTES NFR BLD: 0.13 K/UL (ref 0.1–1.2)
MONOCYTES NFR BLD: 8 % (ref 3–12)
MORPHOLOGY: ABNORMAL
NEUTROPHILS NFR BLD: 37 % (ref 36–65)
NEUTS SEG NFR BLD: 0.62 K/UL (ref 1.5–8.1)
NITRITE UR QL STRIP: NEGATIVE
NRBC BLD-RTO: 1.7 PER 100 WBC
NUCLEATED RED BLOOD CELLS: 2 PER 100 WBC (ref 0–5)
PH UR STRIP: 6 [PH] (ref 5–9)
PLATELET # BLD AUTO: 188 K/UL (ref 138–453)
PMV BLD AUTO: 9.5 FL (ref 8.1–13.5)
POTASSIUM SERPL-SCNC: 3.2 MMOL/L (ref 3.7–5.3)
PROT SERPL-MCNC: 6.8 G/DL (ref 6.4–8.3)
PROT UR STRIP-MCNC: ABNORMAL MG/DL
RBC # BLD AUTO: 2.86 M/UL (ref 3.95–5.11)
SARS-COV-2 RDRP RESP QL NAA+PROBE: DETECTED
SODIUM SERPL-SCNC: 133 MMOL/L (ref 135–144)
SP GR UR STRIP: >1.03 (ref 1.01–1.02)
SPECIMEN DESCRIPTION: ABNORMAL
UROBILINOGEN UR STRIP-ACNC: NORMAL EU/DL (ref 0–1)
WBC OTHER # BLD: 1.7 K/UL (ref 3.5–11.3)

## 2024-02-15 PROCEDURE — 6360000002 HC RX W HCPCS

## 2024-02-15 PROCEDURE — 2580000003 HC RX 258: Performed by: STUDENT IN AN ORGANIZED HEALTH CARE EDUCATION/TRAINING PROGRAM

## 2024-02-15 PROCEDURE — 87635 SARS-COV-2 COVID-19 AMP PRB: CPT

## 2024-02-15 PROCEDURE — 2580000003 HC RX 258: Performed by: EMERGENCY MEDICINE

## 2024-02-15 PROCEDURE — 80053 COMPREHEN METABOLIC PANEL: CPT

## 2024-02-15 PROCEDURE — 97162 PT EVAL MOD COMPLEX 30 MIN: CPT

## 2024-02-15 PROCEDURE — 86901 BLOOD TYPING SEROLOGIC RH(D): CPT

## 2024-02-15 PROCEDURE — 97110 THERAPEUTIC EXERCISES: CPT

## 2024-02-15 PROCEDURE — 87804 INFLUENZA ASSAY W/OPTIC: CPT

## 2024-02-15 PROCEDURE — XW033E5 INTRODUCTION OF REMDESIVIR ANTI-INFECTIVE INTO PERIPHERAL VEIN, PERCUTANEOUS APPROACH, NEW TECHNOLOGY GROUP 5: ICD-10-PCS | Performed by: STUDENT IN AN ORGANIZED HEALTH CARE EDUCATION/TRAINING PROGRAM

## 2024-02-15 PROCEDURE — 86850 RBC ANTIBODY SCREEN: CPT

## 2024-02-15 PROCEDURE — 86140 C-REACTIVE PROTEIN: CPT

## 2024-02-15 PROCEDURE — 6370000000 HC RX 637 (ALT 250 FOR IP): Performed by: STUDENT IN AN ORGANIZED HEALTH CARE EDUCATION/TRAINING PROGRAM

## 2024-02-15 PROCEDURE — 6360000002 HC RX W HCPCS: Performed by: STUDENT IN AN ORGANIZED HEALTH CARE EDUCATION/TRAINING PROGRAM

## 2024-02-15 PROCEDURE — 99223 1ST HOSP IP/OBS HIGH 75: CPT | Performed by: INTERNAL MEDICINE

## 2024-02-15 PROCEDURE — 30233N1 TRANSFUSION OF NONAUTOLOGOUS RED BLOOD CELLS INTO PERIPHERAL VEIN, PERCUTANEOUS APPROACH: ICD-10-PCS | Performed by: INTERNAL MEDICINE

## 2024-02-15 PROCEDURE — 87186 SC STD MICRODIL/AGAR DIL: CPT

## 2024-02-15 PROCEDURE — 93010 ELECTROCARDIOGRAM REPORT: CPT | Performed by: INTERNAL MEDICINE

## 2024-02-15 PROCEDURE — 85018 HEMOGLOBIN: CPT

## 2024-02-15 PROCEDURE — 85014 HEMATOCRIT: CPT

## 2024-02-15 PROCEDURE — 97166 OT EVAL MOD COMPLEX 45 MIN: CPT

## 2024-02-15 PROCEDURE — 6360000002 HC RX W HCPCS: Performed by: EMERGENCY MEDICINE

## 2024-02-15 PROCEDURE — 94761 N-INVAS EAR/PLS OXIMETRY MLT: CPT

## 2024-02-15 PROCEDURE — 83735 ASSAY OF MAGNESIUM: CPT

## 2024-02-15 PROCEDURE — 86900 BLOOD TYPING SEROLOGIC ABO: CPT

## 2024-02-15 PROCEDURE — 71045 X-RAY EXAM CHEST 1 VIEW: CPT

## 2024-02-15 PROCEDURE — 83605 ASSAY OF LACTIC ACID: CPT

## 2024-02-15 PROCEDURE — 93005 ELECTROCARDIOGRAM TRACING: CPT | Performed by: EMERGENCY MEDICINE

## 2024-02-15 PROCEDURE — 87077 CULTURE AEROBIC IDENTIFY: CPT

## 2024-02-15 PROCEDURE — P9016 RBC LEUKOCYTES REDUCED: HCPCS

## 2024-02-15 PROCEDURE — 1200000000 HC SEMI PRIVATE

## 2024-02-15 PROCEDURE — 36430 TRANSFUSION BLD/BLD COMPNT: CPT

## 2024-02-15 PROCEDURE — 81003 URINALYSIS AUTO W/O SCOPE: CPT

## 2024-02-15 PROCEDURE — 87086 URINE CULTURE/COLONY COUNT: CPT

## 2024-02-15 PROCEDURE — 85025 COMPLETE CBC W/AUTO DIFF WBC: CPT

## 2024-02-15 PROCEDURE — 99285 EMERGENCY DEPT VISIT HI MDM: CPT

## 2024-02-15 RX ORDER — POTASSIUM CHLORIDE 7.45 MG/ML
10 INJECTION INTRAVENOUS ONCE
Status: DISCONTINUED | OUTPATIENT
Start: 2024-02-15 | End: 2024-02-20 | Stop reason: HOSPADM

## 2024-02-15 RX ORDER — ASPIRIN 81 MG/1
81 TABLET ORAL DAILY
Status: DISCONTINUED | OUTPATIENT
Start: 2024-02-15 | End: 2024-02-20 | Stop reason: HOSPADM

## 2024-02-15 RX ORDER — POTASSIUM CHLORIDE 20 MEQ/1
40 TABLET, EXTENDED RELEASE ORAL PRN
Status: DISCONTINUED | OUTPATIENT
Start: 2024-02-15 | End: 2024-02-20 | Stop reason: HOSPADM

## 2024-02-15 RX ORDER — POLYETHYLENE GLYCOL 3350 17 G/17G
17 POWDER, FOR SOLUTION ORAL DAILY PRN
Status: DISCONTINUED | OUTPATIENT
Start: 2024-02-15 | End: 2024-02-20 | Stop reason: HOSPADM

## 2024-02-15 RX ORDER — METOPROLOL SUCCINATE 25 MG/1
25 TABLET, EXTENDED RELEASE ORAL DAILY
Status: DISCONTINUED | OUTPATIENT
Start: 2024-02-16 | End: 2024-02-19

## 2024-02-15 RX ORDER — LISINOPRIL 20 MG/1
20 TABLET ORAL 2 TIMES DAILY
Status: DISCONTINUED | OUTPATIENT
Start: 2024-02-15 | End: 2024-02-15

## 2024-02-15 RX ORDER — ATORVASTATIN CALCIUM 40 MG/1
80 TABLET, FILM COATED ORAL NIGHTLY
Status: DISCONTINUED | OUTPATIENT
Start: 2024-02-15 | End: 2024-02-20 | Stop reason: HOSPADM

## 2024-02-15 RX ORDER — ONDANSETRON 2 MG/ML
4 INJECTION INTRAMUSCULAR; INTRAVENOUS EVERY 6 HOURS PRN
Status: DISCONTINUED | OUTPATIENT
Start: 2024-02-15 | End: 2024-02-20 | Stop reason: HOSPADM

## 2024-02-15 RX ORDER — 0.9 % SODIUM CHLORIDE 0.9 %
1000 INTRAVENOUS SOLUTION INTRAVENOUS ONCE
Status: COMPLETED | OUTPATIENT
Start: 2024-02-15 | End: 2024-02-15

## 2024-02-15 RX ORDER — ACETAMINOPHEN 325 MG/1
650 TABLET ORAL EVERY 6 HOURS PRN
Status: DISCONTINUED | OUTPATIENT
Start: 2024-02-15 | End: 2024-02-20 | Stop reason: HOSPADM

## 2024-02-15 RX ORDER — BENZONATATE 100 MG/1
100 CAPSULE ORAL 3 TIMES DAILY PRN
Status: DISCONTINUED | OUTPATIENT
Start: 2024-02-15 | End: 2024-02-20 | Stop reason: HOSPADM

## 2024-02-15 RX ORDER — ONDANSETRON 4 MG/1
4 TABLET, ORALLY DISINTEGRATING ORAL EVERY 8 HOURS PRN
Status: DISCONTINUED | OUTPATIENT
Start: 2024-02-15 | End: 2024-02-20 | Stop reason: HOSPADM

## 2024-02-15 RX ORDER — POTASSIUM CHLORIDE 20 MEQ/1
40 TABLET, EXTENDED RELEASE ORAL ONCE
Status: COMPLETED | OUTPATIENT
Start: 2024-02-15 | End: 2024-02-15

## 2024-02-15 RX ORDER — LISINOPRIL 20 MG/1
20 TABLET ORAL DAILY
Status: DISCONTINUED | OUTPATIENT
Start: 2024-02-15 | End: 2024-02-20 | Stop reason: HOSPADM

## 2024-02-15 RX ORDER — SODIUM CHLORIDE, SODIUM LACTATE, POTASSIUM CHLORIDE, CALCIUM CHLORIDE 600; 310; 30; 20 MG/100ML; MG/100ML; MG/100ML; MG/100ML
INJECTION, SOLUTION INTRAVENOUS CONTINUOUS
Status: ACTIVE | OUTPATIENT
Start: 2024-02-15 | End: 2024-02-17

## 2024-02-15 RX ORDER — GUAIFENESIN 600 MG/1
600 TABLET, EXTENDED RELEASE ORAL 2 TIMES DAILY
Status: DISCONTINUED | OUTPATIENT
Start: 2024-02-15 | End: 2024-02-20 | Stop reason: HOSPADM

## 2024-02-15 RX ORDER — 0.9 % SODIUM CHLORIDE 0.9 %
30 INTRAVENOUS SOLUTION INTRAVENOUS PRN
Status: DISCONTINUED | OUTPATIENT
Start: 2024-02-15 | End: 2024-02-20 | Stop reason: HOSPADM

## 2024-02-15 RX ORDER — METOPROLOL TARTRATE 1 MG/ML
2.5 INJECTION, SOLUTION INTRAVENOUS EVERY 4 HOURS PRN
Status: DISCONTINUED | OUTPATIENT
Start: 2024-02-15 | End: 2024-02-20 | Stop reason: HOSPADM

## 2024-02-15 RX ORDER — METOPROLOL SUCCINATE 25 MG/1
25 TABLET, EXTENDED RELEASE ORAL DAILY
Status: ON HOLD | COMMUNITY
End: 2024-02-20 | Stop reason: HOSPADM

## 2024-02-15 RX ORDER — SODIUM CHLORIDE 9 MG/ML
INJECTION, SOLUTION INTRAVENOUS PRN
Status: DISCONTINUED | OUTPATIENT
Start: 2024-02-15 | End: 2024-02-20 | Stop reason: HOSPADM

## 2024-02-15 RX ORDER — POTASSIUM CHLORIDE 7.45 MG/ML
10 INJECTION INTRAVENOUS ONCE
Status: COMPLETED | OUTPATIENT
Start: 2024-02-15 | End: 2024-02-15

## 2024-02-15 RX ORDER — ACETAMINOPHEN 650 MG/1
650 SUPPOSITORY RECTAL EVERY 6 HOURS PRN
Status: DISCONTINUED | OUTPATIENT
Start: 2024-02-15 | End: 2024-02-20 | Stop reason: HOSPADM

## 2024-02-15 RX ORDER — SODIUM CHLORIDE 0.9 % (FLUSH) 0.9 %
5-40 SYRINGE (ML) INJECTION PRN
Status: DISCONTINUED | OUTPATIENT
Start: 2024-02-15 | End: 2024-02-20 | Stop reason: HOSPADM

## 2024-02-15 RX ORDER — LISINOPRIL 20 MG/1
20 TABLET ORAL DAILY
Status: DISCONTINUED | OUTPATIENT
Start: 2024-02-16 | End: 2024-02-15

## 2024-02-15 RX ORDER — SODIUM CHLORIDE 0.9 % (FLUSH) 0.9 %
5-40 SYRINGE (ML) INJECTION EVERY 12 HOURS SCHEDULED
Status: DISCONTINUED | OUTPATIENT
Start: 2024-02-15 | End: 2024-02-20 | Stop reason: HOSPADM

## 2024-02-15 RX ORDER — POTASSIUM CHLORIDE 7.45 MG/ML
10 INJECTION INTRAVENOUS PRN
Status: DISCONTINUED | OUTPATIENT
Start: 2024-02-15 | End: 2024-02-20 | Stop reason: HOSPADM

## 2024-02-15 RX ORDER — GUAIFENESIN/DEXTROMETHORPHAN 100-10MG/5
5 SYRUP ORAL EVERY 4 HOURS PRN
Status: DISCONTINUED | OUTPATIENT
Start: 2024-02-15 | End: 2024-02-19

## 2024-02-15 RX ORDER — SODIUM CHLORIDE 9 MG/ML
INJECTION, SOLUTION INTRAVENOUS PRN
Status: DISCONTINUED | OUTPATIENT
Start: 2024-02-15 | End: 2024-02-16 | Stop reason: SDUPTHER

## 2024-02-15 RX ADMIN — GUAIFENESIN 600 MG: 600 TABLET, EXTENDED RELEASE ORAL at 20:10

## 2024-02-15 RX ADMIN — ASPIRIN 81 MG: 81 TABLET, COATED ORAL at 10:42

## 2024-02-15 RX ADMIN — GUAIFENESIN 600 MG: 600 TABLET, EXTENDED RELEASE ORAL at 10:42

## 2024-02-15 RX ADMIN — SODIUM CHLORIDE 1000 ML: 9 INJECTION, SOLUTION INTRAVENOUS at 05:53

## 2024-02-15 RX ADMIN — SODIUM CHLORIDE, POTASSIUM CHLORIDE, SODIUM LACTATE AND CALCIUM CHLORIDE: 600; 310; 30; 20 INJECTION, SOLUTION INTRAVENOUS at 10:41

## 2024-02-15 RX ADMIN — POTASSIUM CHLORIDE 10 MEQ: 7.46 INJECTION, SOLUTION INTRAVENOUS at 07:35

## 2024-02-15 RX ADMIN — REMDESIVIR 200 MG: 100 INJECTION, POWDER, LYOPHILIZED, FOR SOLUTION INTRAVENOUS at 10:45

## 2024-02-15 RX ADMIN — SODIUM CHLORIDE, PRESERVATIVE FREE 10 ML: 5 INJECTION INTRAVENOUS at 10:45

## 2024-02-15 RX ADMIN — POTASSIUM CHLORIDE 40 MEQ: 1500 TABLET, EXTENDED RELEASE ORAL at 17:55

## 2024-02-15 RX ADMIN — ATORVASTATIN CALCIUM 80 MG: 40 TABLET, FILM COATED ORAL at 20:10

## 2024-02-15 RX ADMIN — LISINOPRIL 20 MG: 20 TABLET ORAL at 10:42

## 2024-02-15 RX ADMIN — CALCIUM CARBONATE-VITAMIN D TAB 500 MG-200 UNIT 1 TABLET: 500-200 TAB at 17:55

## 2024-02-15 RX ADMIN — METOPROLOL TARTRATE 25 MG: 25 TABLET, FILM COATED ORAL at 10:50

## 2024-02-15 RX ADMIN — FILGRASTIM-AAFI 480 MCG: 480 INJECTION, SOLUTION SUBCUTANEOUS at 22:17

## 2024-02-15 ASSESSMENT — LIFESTYLE VARIABLES
HOW MANY STANDARD DRINKS CONTAINING ALCOHOL DO YOU HAVE ON A TYPICAL DAY: PATIENT DOES NOT DRINK
HOW OFTEN DO YOU HAVE A DRINK CONTAINING ALCOHOL: NEVER
HOW MANY STANDARD DRINKS CONTAINING ALCOHOL DO YOU HAVE ON A TYPICAL DAY: PATIENT DOES NOT DRINK
HOW OFTEN DO YOU HAVE A DRINK CONTAINING ALCOHOL: NEVER

## 2024-02-15 ASSESSMENT — PAIN SCALES - GENERAL
PAINLEVEL_OUTOF10: 0

## 2024-02-15 ASSESSMENT — PAIN - FUNCTIONAL ASSESSMENT: PAIN_FUNCTIONAL_ASSESSMENT: NONE - DENIES PAIN

## 2024-02-15 NOTE — ED PROVIDER NOTES
[facility]  EMERGENCY DEPARTMENT ENCOUNTER      Pt Name: Renetta Malcolm  MRN: [id number]  Birthdate 1950  Date of evaluation: 2/15/2024  Provider: Ochoa Mejia MD    CHIEF COMPLAINT       Weakness      HISTORY OF PRESENT ILLNESS      Renetta Malcolm is a 73 y.o. female with a history of Stage IIIA endometrial cancer, s/p radical hysterectomy, currently on carboplatin/paclitaxel/dostarlimab (last dose 2/6)  who presents to the emergency department by EMS for evaluation of generalized weakness. States that she got up to go to the bathroom and felt somewhat weak and off balance. Had to lower herself to the floor. Denies any focal neurologic complaints. Also denies any fever or recent illness.    No nausea or vomiting but did have some non-bloody diarrhea earlier this week.     No chest pain or dyspnea. No abdominal pain. Has had decreased UOP, but no other  complaints.      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 performed by Alexi Allred MD at [facility] OR    INSERTABLE CARDIAC MONITOR      Loop recorder . Monitored by Dr. Turner in [place]. Last check 2/21/2023    IR PORT PLACEMENT EQUAL OR GREATER THAN 5 YEARS  12/13/2023    IR PORT PLACEMENT EQUAL OR GREATER THAN 5 YEARS 12/13/2023 [facility] CARDIAC CATH/IR LAB    LAPAROSCOPIC

## 2024-02-15 NOTE — H&P
discussed with the patient and family members today  Social determinants of health that may impact management: none  Code status: Full Code   Disposition: Discharge plan is pending    Consults: IP CONSULT TO INFECTIOUS DISEASES  IP CONSULT TO DIETITIAN  IP CONSULT TO SOCIAL WORK  PHARMACY TO DOSE MEDICATION  IP CONSULT TO HEM/ONC  PT/OT  Vital signs per unit routine  Pulse Oximetry   RT Evaluation & Treat   Reg Diet  Daily weights  Fall precautions  Incentive spirometry  Intake and output   Neurovascular checks  Place intermittent pneumatic compression device  Telemetry monitoring    [facility] Advanced Care Planning documentation:  [x] I have confirmed that the patient's Advance Care Plan is present, Code Status is documented, or surrogate decision maker is listed in the patient's medical record    [facility] Heart failure documentation - N/A    Above plan discussed with the patient who agreed to the above plan     CORE MEASURES  Core measures including DVT prophylaxis, Code Status/Advanced Directives, Nutrition, Therapy Options as well as prior records, imaging, and labs were reviewed at this encounter.    Please note that this chart was generated using voice recognition Dragon dictation software. Although every effort was made to ensure the accuracy of this automated transcription, some errors in transcription may have occurred.    Fabricio German MD  2/15/2024 8:00 AM

## 2024-02-15 NOTE — CARE COORDINATION
Case Management Assessment  Initial Evaluation    Date/Time of Evaluation: 2/15/2024 11:21 AM  Assessment Completed by: Joyce BRINK LSW     If patient is discharged prior to next notation, then this note serves as note for discharge by case management.    Patient Name: Renetta Malcolm                   YOB: 1950  Diagnosis: Hypokalemia [E87.6]  Anemia due to antineoplastic chemotherapy [D64.81, T45.1X5A]  Acute COVID-19 [U07.1]  COVID-19 virus infection [U07.1]                   Date / Time: 2/15/2024  5:30 AM    Patient Admission Status: Inpatient   Readmission Risk (Low < 19, Mod (19-27), High > 27): Readmission Risk Score: 19.2    Current PCP: Peterson Spangler, DO  PCP verified by CM? Yes    Chart Reviewed: Yes      History Provided by: Patient  Patient Orientation: Alert and Oriented, Person, Place, Situation    Patient Cognition: Alert    Hospitalization in the last 30 days (Readmission):  No    If yes, Readmission Assessment in  Navigator will be completed.    Advance Directives:      Code Status: Full Code   Patient's Primary Decision Maker is: Legal Next of Kin    Primary Decision Maker: Randy Malcolm - Child - [phone]    Secondary Decision Maker: GoldJanis - Other - [phone]    Discharge Planning:    Patient lives with: Children Type of Home: House  Primary Care Giver: Self  Patient Support Systems include: Children   Current Financial resources: Medicare  Current community resources: None  Current services prior to admission: Durable Medical Equipment            Current DME: Walker, Cane, Shower Chair, uses as needed            Type of Home Care services:  None    ADLS  Prior functional level: Independent in ADLs/IADLs  Current functional level: Independent in ADLs/IADLs    PT AM-PAC:   /24  OT AM-PAC:   /24    Family can provide assistance at DC: Yes  Would you like Case Management to discuss the discharge plan with any other family members/significant

## 2024-02-16 LAB
ALBUMIN SERPL-MCNC: 2.8 G/DL (ref 3.5–5.2)
ALBUMIN/GLOB SERPL: 1 {RATIO} (ref 1–2.5)
ALP SERPL-CCNC: 97 U/L (ref 35–104)
ALT SERPL-CCNC: 23 U/L (ref 5–33)
ANION GAP SERPL CALCULATED.3IONS-SCNC: 13 MMOL/L (ref 9–17)
AST SERPL-CCNC: 22 U/L
BASOPHILS # BLD: 0 K/UL (ref 0–0.2)
BASOPHILS NFR BLD: 0 % (ref 0–2)
BILIRUB SERPL-MCNC: 0.9 MG/DL (ref 0.3–1.2)
BUN SERPL-MCNC: 11 MG/DL (ref 8–23)
BUN/CREAT SERPL: 22 (ref 9–20)
CALCIUM SERPL-MCNC: 7.8 MG/DL (ref 8.6–10.4)
CHLORIDE SERPL-SCNC: 101 MMOL/L (ref 98–107)
CO2 SERPL-SCNC: 24 MMOL/L (ref 20–31)
CREAT SERPL-MCNC: 0.5 MG/DL (ref 0.5–0.9)
CRP SERPL HS-MCNC: 230.3 MG/L (ref 0–5)
EOSINOPHIL # BLD: 0.06 K/UL (ref 0–0.44)
EOSINOPHILS RELATIVE PERCENT: 1 % (ref 1–4)
ERYTHROCYTE [DISTWIDTH] IN BLOOD BY AUTOMATED COUNT: 19.7 % (ref 11.8–14.4)
GFR SERPL CREATININE-BSD FRML MDRD: >60 ML/MIN/1.73M2
GLUCOSE SERPL-MCNC: 88 MG/DL (ref 70–99)
HCT VFR BLD AUTO: 25.3 % (ref 36.3–47.1)
HCT VFR BLD AUTO: 26.2 % (ref 36.3–47.1)
HCT VFR BLD AUTO: 26.3 % (ref 36.3–47.1)
HCT VFR BLD AUTO: 28.3 % (ref 36.3–47.1)
HGB BLD-MCNC: 7.8 G/DL (ref 11.9–15.1)
HGB BLD-MCNC: 7.9 G/DL (ref 11.9–15.1)
HGB BLD-MCNC: 8.3 G/DL (ref 11.9–15.1)
HGB BLD-MCNC: 8.6 G/DL (ref 11.9–15.1)
IMM GRANULOCYTES # BLD AUTO: 0.19 K/UL (ref 0–0.3)
IMM GRANULOCYTES NFR BLD: 3 %
LYMPHOCYTES NFR BLD: 1.66 K/UL (ref 1.1–3.7)
LYMPHOCYTES RELATIVE PERCENT: 26 % (ref 24–43)
MCH RBC QN AUTO: 26.5 PG (ref 25.2–33.5)
MCHC RBC AUTO-ENTMCNC: 30.2 G/DL (ref 28.4–34.8)
MCV RBC AUTO: 87.9 FL (ref 82.6–102.9)
MONOCYTES NFR BLD: 0.38 K/UL (ref 0.1–1.2)
MONOCYTES NFR BLD: 6 % (ref 3–12)
MORPHOLOGY: NORMAL
NEUTROPHILS NFR BLD: 64 % (ref 36–65)
NEUTS SEG NFR BLD: 4.11 K/UL (ref 1.5–8.1)
NRBC BLD-RTO: 0.6 PER 100 WBC
PLATELET # BLD AUTO: 150 K/UL (ref 138–453)
PMV BLD AUTO: 9.5 FL (ref 8.1–13.5)
POTASSIUM SERPL-SCNC: 3.7 MMOL/L (ref 3.7–5.3)
PROT SERPL-MCNC: 5.6 G/DL (ref 6.4–8.3)
RBC # BLD AUTO: 2.98 M/UL (ref 3.95–5.11)
SODIUM SERPL-SCNC: 138 MMOL/L (ref 135–144)
WBC OTHER # BLD: 6.4 K/UL (ref 3.5–11.3)

## 2024-02-16 PROCEDURE — 94761 N-INVAS EAR/PLS OXIMETRY MLT: CPT

## 2024-02-16 PROCEDURE — 86140 C-REACTIVE PROTEIN: CPT

## 2024-02-16 PROCEDURE — 2580000003 HC RX 258: Performed by: STUDENT IN AN ORGANIZED HEALTH CARE EDUCATION/TRAINING PROGRAM

## 2024-02-16 PROCEDURE — 97110 THERAPEUTIC EXERCISES: CPT

## 2024-02-16 PROCEDURE — 85014 HEMATOCRIT: CPT

## 2024-02-16 PROCEDURE — 99232 SBSQ HOSP IP/OBS MODERATE 35: CPT | Performed by: INTERNAL MEDICINE

## 2024-02-16 PROCEDURE — 85018 HEMOGLOBIN: CPT

## 2024-02-16 PROCEDURE — 80053 COMPREHEN METABOLIC PANEL: CPT

## 2024-02-16 PROCEDURE — 36415 COLL VENOUS BLD VENIPUNCTURE: CPT

## 2024-02-16 PROCEDURE — 2500000003 HC RX 250 WO HCPCS: Performed by: STUDENT IN AN ORGANIZED HEALTH CARE EDUCATION/TRAINING PROGRAM

## 2024-02-16 PROCEDURE — 97116 GAIT TRAINING THERAPY: CPT

## 2024-02-16 PROCEDURE — 6370000000 HC RX 637 (ALT 250 FOR IP): Performed by: STUDENT IN AN ORGANIZED HEALTH CARE EDUCATION/TRAINING PROGRAM

## 2024-02-16 PROCEDURE — 1200000000 HC SEMI PRIVATE

## 2024-02-16 PROCEDURE — 6360000002 HC RX W HCPCS: Performed by: STUDENT IN AN ORGANIZED HEALTH CARE EDUCATION/TRAINING PROGRAM

## 2024-02-16 PROCEDURE — 97535 SELF CARE MNGMENT TRAINING: CPT

## 2024-02-16 PROCEDURE — 85025 COMPLETE CBC W/AUTO DIFF WBC: CPT

## 2024-02-16 RX ORDER — ASCORBIC ACID 500 MG
500 TABLET ORAL 2 TIMES DAILY
Status: DISCONTINUED | OUTPATIENT
Start: 2024-02-16 | End: 2024-02-20 | Stop reason: HOSPADM

## 2024-02-16 RX ORDER — VITAMIN B COMPLEX
1000 TABLET ORAL DAILY
Status: DISCONTINUED | OUTPATIENT
Start: 2024-02-16 | End: 2024-02-20 | Stop reason: HOSPADM

## 2024-02-16 RX ORDER — CEPHALEXIN 500 MG/1
500 CAPSULE ORAL EVERY 6 HOURS SCHEDULED
Status: DISCONTINUED | OUTPATIENT
Start: 2024-02-16 | End: 2024-02-20 | Stop reason: HOSPADM

## 2024-02-16 RX ORDER — ZINC SULFATE 50(220)MG
50 CAPSULE ORAL DAILY
Status: DISCONTINUED | OUTPATIENT
Start: 2024-02-16 | End: 2024-02-20 | Stop reason: HOSPADM

## 2024-02-16 RX ORDER — FAMOTIDINE 20 MG/1
20 TABLET, FILM COATED ORAL 2 TIMES DAILY
Status: DISCONTINUED | OUTPATIENT
Start: 2024-02-16 | End: 2024-02-20 | Stop reason: HOSPADM

## 2024-02-16 RX ADMIN — CEPHALEXIN 500 MG: 500 CAPSULE ORAL at 20:37

## 2024-02-16 RX ADMIN — CALCIUM CARBONATE-VITAMIN D TAB 500 MG-200 UNIT 1 TABLET: 500-200 TAB at 17:06

## 2024-02-16 RX ADMIN — METOPROLOL SUCCINATE 25 MG: 25 TABLET, EXTENDED RELEASE ORAL at 09:47

## 2024-02-16 RX ADMIN — GUAIFENESIN 600 MG: 600 TABLET, EXTENDED RELEASE ORAL at 20:34

## 2024-02-16 RX ADMIN — METOPROLOL TARTRATE 2.5 MG: 5 INJECTION INTRAVENOUS at 07:15

## 2024-02-16 RX ADMIN — OXYCODONE HYDROCHLORIDE AND ACETAMINOPHEN 500 MG: 500 TABLET ORAL at 20:35

## 2024-02-16 RX ADMIN — Medication 1000 UNITS: at 13:09

## 2024-02-16 RX ADMIN — REMDESIVIR 100 MG: 100 INJECTION, POWDER, LYOPHILIZED, FOR SOLUTION INTRAVENOUS at 09:39

## 2024-02-16 RX ADMIN — ZINC SULFATE 220 MG (50 MG) CAPSULE 50 MG: CAPSULE at 13:09

## 2024-02-16 RX ADMIN — ASPIRIN 81 MG: 81 TABLET, COATED ORAL at 09:33

## 2024-02-16 RX ADMIN — GUAIFENESIN 600 MG: 600 TABLET, EXTENDED RELEASE ORAL at 09:33

## 2024-02-16 RX ADMIN — FAMOTIDINE 20 MG: 20 TABLET, FILM COATED ORAL at 20:35

## 2024-02-16 RX ADMIN — ATORVASTATIN CALCIUM 80 MG: 40 TABLET, FILM COATED ORAL at 20:35

## 2024-02-16 RX ADMIN — FAMOTIDINE 20 MG: 20 TABLET, FILM COATED ORAL at 09:33

## 2024-02-16 RX ADMIN — CALCIUM CARBONATE-VITAMIN D TAB 500 MG-200 UNIT 1 TABLET: 500-200 TAB at 07:15

## 2024-02-16 RX ADMIN — OXYCODONE HYDROCHLORIDE AND ACETAMINOPHEN 500 MG: 500 TABLET ORAL at 13:09

## 2024-02-16 RX ADMIN — LISINOPRIL 20 MG: 20 TABLET ORAL at 09:33

## 2024-02-16 RX ADMIN — SODIUM CHLORIDE, POTASSIUM CHLORIDE, SODIUM LACTATE AND CALCIUM CHLORIDE: 600; 310; 30; 20 INJECTION, SOLUTION INTRAVENOUS at 18:23

## 2024-02-16 ASSESSMENT — PAIN SCALES - GENERAL: PAINLEVEL_OUTOF10: 0

## 2024-02-16 NOTE — CONSULTS
Today's Date: 2/15/2024  Patient Name: Renetta Malcolm  Date of admission: 2/15/2024  5:30 AM  Patient's age: 73 y.o., 1950  Admission Dx: Hypokalemia [E87.6]  Anemia due to antineoplastic chemotherapy [D64.81, T45.1X5A]  Acute COVID-19 [U07.1]  COVID-19 virus infection [U07.1]    Reason for Consult: Endometrial cancer on active chemotherapy  Requesting Physician: Fabricio German MD    CHIEF COMPLAINT: Weakness and diarrhea    History Obtained From:  patient    HISTORY OF PRESENT ILLNESS:      The patient is a 73 y.o.  female with stage IIIA endometrial cancer s/p radical hysterectomy and on carboplatin/paclitaxel/dostarlimab (last dose 2/6), presented through ER with complaints of generalized weakness with diarrhea x 3 days as well as decreased appetite. She denied fever, chills, N/V.   Lab work was mostly unremarkable other than hypokalemia, WBC of 1.7 and Hgb 7.6. The patient does have chronic anemia and leukopenia   Absolute neutrophil count 620  She also had positive Covid test.     DIAGNOSIS:   Stage IIIA (FIGO stage IIIc1)  ([id number]) endometrial cancer, High grade serous carcinoma.   CURRENT THERAPY:  Status post radical hysterectomy, bilateral salpingo-oophorectomy and lymph node dissection 10/25/2023  chemoimmunotherapy with carboplatin, Taxol and Dostarlimab postoperatively          Past Medical History:   has a past medical history of Arthritis, Cataract, Cerebral artery occlusion with cerebral infarction (HCC), Hypertension, Wears dentures, and Wellness examination.    Past Surgical History:   has a past surgical history that includes Carpal tunnel release (Bilateral); Cataract removal (Bilateral); Insertable Cardiac Monitor; Total hip arthroplasty (Right); Total knee arthroplasty (Bilateral); Laparoscopic hysterectomy (Bilateral, 10/25/2023); Hysterectomy (N/A, 10/25/2023); and IR PORT PLACEMENT > 5 YEARS (12/13/2023).     Medications:    Reviewed in Epic     Allergies:  Watermelon 
Consulted Re. Anemia    Patient with extreme fatigue and generalized weakness. Found to have COVID19, and to be anemia.  She is currently on chemotherapy for endometrial cancer. He has had diarrhea, and a few time a week notices a little blood in the stool. Denies abdominal pain, nausea, vomiting.  No history of ulcers, gastritis, colitis, colon polyps etc.  She feels much better, but still not back to base line.  She has had no gross rectal bleeding in the hospital.  Prior to admission she was on low dose aspirin, but no other NSAIDs or anticoagulants.     She is current eating a regular diet.    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 performed by Alexi Allred MD at [facility] OR    INSERTABLE CARDIAC MONITOR      Loop recorder . Monitored by Dr. Turner in [place]. Last check 2/21/2023    IR PORT PLACEMENT EQUAL OR GREATER THAN 5 YEARS  12/13/2023    IR PORT PLACEMENT EQUAL OR GREATER THAN 5 YEARS 12/13/2023 [facility] CARDIAC CATH/IR LAB    LAPAROSCOPIC HYSTERECTOMY Bilateral 10/25/2023    ROBOTIC, BSO, ICG DYE INTRACERVICAL INJECTION, SENTINEL LYMPH NODE MAPPING, BILATERAL PELVIC AND LYMPH NODE DISSECTION, RIGHT PARA-AORTIC LYMPH NODE, OMENTAL BIOPSY    TOTAL HIP ARTHROPLASTY Right     11/17/17    TOTAL KNEE ARTHROPLASTY Bilateral     9/20/99     Current Facility-Administered Medications 
Remdesivir Initiation Note    This patient meets criteria for initiation of remdesivir based on the following:  Proven COVID-19  Moderate disease (Requiring supplemental O2)  Acceptable hepatic function (ALT within 5 times ULN)   Latest Reference Range & Units Most Recent   ALT 5 - 33 U/L 24  2/15/24 05:52        AST <32 U/L 20  2/15/24 05:52       Exclusion Criteria:  Severe disease requiring invasive or non-invasive mechanical ventilation (includes HFNC & BiPAP)  Could consider use in patients requiring high flow if early on in the disease course (based on symptom duration)  Use of more than 1 vasopressor prior to remdesivir initiation  Already improving on supportive treatment and/or impending discharge  Patients in whom the clinical team think death is in the immediate short-term where remdesivir is unlikely to change the clinical outcome     Liver function tests will be monitored daily while on remdesivir.      Thank you  Lorraine José, PharmD 2/15/2024 8:23 AM      
ml  Hematology:  Recent Labs     02/15/24  0552 02/15/24  1258 02/15/24  2110 02/16/24  0225 02/16/24  0535   WBC 1.7*  --   --   --  6.4   HGB 7.6*   < > 6.3* 7.8* 7.9*   HCT 24.6*   < > 20.8* 25.3* 26.2*    < > = values in this interval not displayed.     Chemistry:  Recent Labs     02/15/24  0552 02/16/24  0535   * 138   K 3.2* 3.7   CL 95* 101   CO2 23 24   GLUCOSE 154* 88   BUN 14 11   CREATININE 0.8 0.5   MG 1.5*  --    CALCIUM 8.1* 7.8*     Recent Labs     02/15/24  0552 02/16/24  0535   PROT 6.8 5.6*   LABALBU 3.3* 2.8*   AST 20 22   ALT 24 23   ALKPHOS 93 97   BILITOT 0.8 0.9         Nutrition Diagnosis:   Altered nutrition-related lab values related to impaired nutrient utilization as evidenced by lab values    Nutrition Interventions:   Food and/or Nutrient Delivery: Continue Current Diet  Nutrition Education/Counseling: No recommendation at this time  Coordination of Nutrition Care: Continue to monitor while inpatient  Plan of Care discussed with: Patient    Goals:     Goals: Meet at least 75% of estimated needs       Nutrition Monitoring and Evaluation:   Behavioral-Environmental Outcomes: None Identified  Food/Nutrient Intake Outcomes: Food and Nutrient Intake  Physical Signs/Symptoms Outcomes: Biochemical Data, Weight    Discharge Planning:    Continue current diet     CRISTOBAL BUSH RD, LD  Contact: [phone]

## 2024-02-17 LAB
ALBUMIN SERPL-MCNC: 2.6 G/DL (ref 3.5–5.2)
ALBUMIN/GLOB SERPL: 0.9 {RATIO} (ref 1–2.5)
ALP SERPL-CCNC: 104 U/L (ref 35–104)
ALT SERPL-CCNC: 24 U/L (ref 5–33)
ANION GAP SERPL CALCULATED.3IONS-SCNC: 10 MMOL/L (ref 9–17)
AST SERPL-CCNC: 24 U/L
BASOPHILS # BLD: 0 K/UL (ref 0–0.2)
BASOPHILS NFR BLD: 0 % (ref 0–2)
BILIRUB SERPL-MCNC: 0.3 MG/DL (ref 0.3–1.2)
BUN SERPL-MCNC: 11 MG/DL (ref 8–23)
BUN/CREAT SERPL: 22 (ref 9–20)
CALCIUM SERPL-MCNC: 8.6 MG/DL (ref 8.6–10.4)
CHLORIDE SERPL-SCNC: 100 MMOL/L (ref 98–107)
CO2 SERPL-SCNC: 26 MMOL/L (ref 20–31)
CREAT SERPL-MCNC: 0.5 MG/DL (ref 0.5–0.9)
CRP SERPL HS-MCNC: 176.6 MG/L (ref 0–5)
EOSINOPHIL # BLD: 0.11 K/UL (ref 0–0.44)
EOSINOPHILS RELATIVE PERCENT: 1 % (ref 1–4)
ERYTHROCYTE [DISTWIDTH] IN BLOOD BY AUTOMATED COUNT: 20 % (ref 11.8–14.4)
GFR SERPL CREATININE-BSD FRML MDRD: >60 ML/MIN/1.73M2
GLUCOSE SERPL-MCNC: 128 MG/DL (ref 70–99)
HCT VFR BLD AUTO: 24.9 % (ref 36.3–47.1)
HCT VFR BLD AUTO: 25.7 % (ref 36.3–47.1)
HCT VFR BLD AUTO: 26.6 % (ref 36.3–47.1)
HCT VFR BLD AUTO: 27.3 % (ref 36.3–47.1)
HGB BLD-MCNC: 7.8 G/DL (ref 11.9–15.1)
HGB BLD-MCNC: 7.9 G/DL (ref 11.9–15.1)
HGB BLD-MCNC: 8.4 G/DL (ref 11.9–15.1)
HGB BLD-MCNC: 8.6 G/DL (ref 11.9–15.1)
IMM GRANULOCYTES # BLD AUTO: 0 K/UL (ref 0–0.3)
IMM GRANULOCYTES NFR BLD: 0 %
LYMPHOCYTES NFR BLD: 0.57 K/UL (ref 1.1–3.7)
LYMPHOCYTES RELATIVE PERCENT: 5 % (ref 24–43)
MAGNESIUM SERPL-MCNC: 1.6 MG/DL (ref 1.6–2.6)
MCH RBC QN AUTO: 26.8 PG (ref 25.2–33.5)
MCHC RBC AUTO-ENTMCNC: 30.7 G/DL (ref 28.4–34.8)
MCV RBC AUTO: 87.1 FL (ref 82.6–102.9)
MONOCYTES NFR BLD: 0.8 K/UL (ref 0.1–1.2)
MONOCYTES NFR BLD: 7 % (ref 3–12)
MORPHOLOGY: ABNORMAL
NEUTROPHILS NFR BLD: 87 % (ref 36–65)
NEUTS SEG NFR BLD: 10.01 K/UL (ref 1.5–8.1)
NRBC BLD-RTO: 0.2 PER 100 WBC
NUCLEATED RED BLOOD CELLS: 1 PER 100 WBC (ref 0–5)
PLATELET # BLD AUTO: 156 K/UL (ref 138–453)
PMV BLD AUTO: 10 FL (ref 8.1–13.5)
POTASSIUM SERPL-SCNC: 3.3 MMOL/L (ref 3.7–5.3)
PROT SERPL-MCNC: 5.4 G/DL (ref 6.4–8.3)
RBC # BLD AUTO: 2.95 M/UL (ref 3.95–5.11)
SODIUM SERPL-SCNC: 136 MMOL/L (ref 135–144)
WBC OTHER # BLD: 11.5 K/UL (ref 3.5–11.3)

## 2024-02-17 PROCEDURE — 85014 HEMATOCRIT: CPT

## 2024-02-17 PROCEDURE — 6360000002 HC RX W HCPCS: Performed by: STUDENT IN AN ORGANIZED HEALTH CARE EDUCATION/TRAINING PROGRAM

## 2024-02-17 PROCEDURE — 6370000000 HC RX 637 (ALT 250 FOR IP): Performed by: STUDENT IN AN ORGANIZED HEALTH CARE EDUCATION/TRAINING PROGRAM

## 2024-02-17 PROCEDURE — 36415 COLL VENOUS BLD VENIPUNCTURE: CPT

## 2024-02-17 PROCEDURE — 94761 N-INVAS EAR/PLS OXIMETRY MLT: CPT

## 2024-02-17 PROCEDURE — 97116 GAIT TRAINING THERAPY: CPT

## 2024-02-17 PROCEDURE — 97535 SELF CARE MNGMENT TRAINING: CPT

## 2024-02-17 PROCEDURE — 83735 ASSAY OF MAGNESIUM: CPT

## 2024-02-17 PROCEDURE — 1200000000 HC SEMI PRIVATE

## 2024-02-17 PROCEDURE — 85025 COMPLETE CBC W/AUTO DIFF WBC: CPT

## 2024-02-17 PROCEDURE — 99232 SBSQ HOSP IP/OBS MODERATE 35: CPT | Performed by: INTERNAL MEDICINE

## 2024-02-17 PROCEDURE — 80053 COMPREHEN METABOLIC PANEL: CPT

## 2024-02-17 PROCEDURE — 86140 C-REACTIVE PROTEIN: CPT

## 2024-02-17 PROCEDURE — 85018 HEMOGLOBIN: CPT

## 2024-02-17 PROCEDURE — 2580000003 HC RX 258: Performed by: STUDENT IN AN ORGANIZED HEALTH CARE EDUCATION/TRAINING PROGRAM

## 2024-02-17 PROCEDURE — 2500000003 HC RX 250 WO HCPCS: Performed by: STUDENT IN AN ORGANIZED HEALTH CARE EDUCATION/TRAINING PROGRAM

## 2024-02-17 RX ADMIN — GUAIFENESIN 600 MG: 600 TABLET, EXTENDED RELEASE ORAL at 20:42

## 2024-02-17 RX ADMIN — OXYCODONE HYDROCHLORIDE AND ACETAMINOPHEN 500 MG: 500 TABLET ORAL at 20:42

## 2024-02-17 RX ADMIN — ASPIRIN 81 MG: 81 TABLET, COATED ORAL at 09:33

## 2024-02-17 RX ADMIN — Medication 1000 UNITS: at 09:33

## 2024-02-17 RX ADMIN — ATORVASTATIN CALCIUM 80 MG: 40 TABLET, FILM COATED ORAL at 20:42

## 2024-02-17 RX ADMIN — ZINC SULFATE 220 MG (50 MG) CAPSULE 50 MG: CAPSULE at 09:33

## 2024-02-17 RX ADMIN — CALCIUM CARBONATE-VITAMIN D TAB 500 MG-200 UNIT 1 TABLET: 500-200 TAB at 09:33

## 2024-02-17 RX ADMIN — CALCIUM CARBONATE-VITAMIN D TAB 500 MG-200 UNIT 1 TABLET: 500-200 TAB at 16:40

## 2024-02-17 RX ADMIN — CEPHALEXIN 500 MG: 500 CAPSULE ORAL at 00:06

## 2024-02-17 RX ADMIN — POTASSIUM CHLORIDE 40 MEQ: 1500 TABLET, EXTENDED RELEASE ORAL at 09:33

## 2024-02-17 RX ADMIN — CEPHALEXIN 500 MG: 500 CAPSULE ORAL at 17:31

## 2024-02-17 RX ADMIN — GUAIFENESIN 600 MG: 600 TABLET, EXTENDED RELEASE ORAL at 09:33

## 2024-02-17 RX ADMIN — CEPHALEXIN 500 MG: 500 CAPSULE ORAL at 05:11

## 2024-02-17 RX ADMIN — METOPROLOL TARTRATE 2.5 MG: 5 INJECTION INTRAVENOUS at 04:05

## 2024-02-17 RX ADMIN — FAMOTIDINE 20 MG: 20 TABLET, FILM COATED ORAL at 20:42

## 2024-02-17 RX ADMIN — REMDESIVIR 100 MG: 100 INJECTION, POWDER, LYOPHILIZED, FOR SOLUTION INTRAVENOUS at 09:43

## 2024-02-17 RX ADMIN — METOPROLOL SUCCINATE 25 MG: 25 TABLET, EXTENDED RELEASE ORAL at 09:33

## 2024-02-17 RX ADMIN — SODIUM CHLORIDE, PRESERVATIVE FREE 10 ML: 5 INJECTION INTRAVENOUS at 20:41

## 2024-02-17 RX ADMIN — FAMOTIDINE 20 MG: 20 TABLET, FILM COATED ORAL at 09:33

## 2024-02-17 RX ADMIN — SODIUM CHLORIDE, PRESERVATIVE FREE 10 ML: 5 INJECTION INTRAVENOUS at 09:36

## 2024-02-17 RX ADMIN — SODIUM CHLORIDE, PRESERVATIVE FREE 30 ML: 5 INJECTION INTRAVENOUS at 10:52

## 2024-02-17 RX ADMIN — LISINOPRIL 20 MG: 20 TABLET ORAL at 09:33

## 2024-02-17 RX ADMIN — OXYCODONE HYDROCHLORIDE AND ACETAMINOPHEN 500 MG: 500 TABLET ORAL at 09:33

## 2024-02-17 RX ADMIN — CEPHALEXIN 500 MG: 500 CAPSULE ORAL at 11:37

## 2024-02-17 RX ADMIN — METOPROLOL TARTRATE 2.5 MG: 5 INJECTION INTRAVENOUS at 12:59

## 2024-02-18 LAB
ALBUMIN SERPL-MCNC: 2.5 G/DL (ref 3.5–5.2)
ALBUMIN/GLOB SERPL: 0.8 {RATIO} (ref 1–2.5)
ALP SERPL-CCNC: 99 U/L (ref 35–104)
ALT SERPL-CCNC: 23 U/L (ref 5–33)
ANION GAP SERPL CALCULATED.3IONS-SCNC: 8 MMOL/L (ref 9–17)
AST SERPL-CCNC: 23 U/L
BASOPHILS # BLD: 0 K/UL (ref 0–0.2)
BASOPHILS NFR BLD: 0 % (ref 0–2)
BILIRUB SERPL-MCNC: 0.3 MG/DL (ref 0.3–1.2)
BUN SERPL-MCNC: 11 MG/DL (ref 8–23)
BUN/CREAT SERPL: 22 (ref 9–20)
CALCIUM SERPL-MCNC: 8.3 MG/DL (ref 8.6–10.4)
CHLORIDE SERPL-SCNC: 100 MMOL/L (ref 98–107)
CO2 SERPL-SCNC: 27 MMOL/L (ref 20–31)
CREAT SERPL-MCNC: 0.5 MG/DL (ref 0.5–0.9)
CRP SERPL HS-MCNC: 135.5 MG/L (ref 0–5)
EOSINOPHIL # BLD: 0 K/UL (ref 0–0.44)
EOSINOPHILS RELATIVE PERCENT: 0 % (ref 1–4)
ERYTHROCYTE [DISTWIDTH] IN BLOOD BY AUTOMATED COUNT: 20.2 % (ref 11.8–14.4)
GFR SERPL CREATININE-BSD FRML MDRD: >60 ML/MIN/1.73M2
GLUCOSE SERPL-MCNC: 108 MG/DL (ref 70–99)
HCT VFR BLD AUTO: 23.8 % (ref 36.3–47.1)
HCT VFR BLD AUTO: 25.6 % (ref 36.3–47.1)
HCT VFR BLD AUTO: 27 % (ref 36.3–47.1)
HGB BLD-MCNC: 7.3 G/DL (ref 11.9–15.1)
HGB BLD-MCNC: 7.8 G/DL (ref 11.9–15.1)
HGB BLD-MCNC: 8.3 G/DL (ref 11.9–15.1)
IMM GRANULOCYTES # BLD AUTO: 0.07 K/UL (ref 0–0.3)
IMM GRANULOCYTES NFR BLD: 1 %
LYMPHOCYTES NFR BLD: 1.13 K/UL (ref 1.1–3.7)
LYMPHOCYTES RELATIVE PERCENT: 17 % (ref 24–43)
MAGNESIUM SERPL-MCNC: 1.5 MG/DL (ref 1.6–2.6)
MCH RBC QN AUTO: 26.4 PG (ref 25.2–33.5)
MCHC RBC AUTO-ENTMCNC: 30.5 G/DL (ref 28.4–34.8)
MCV RBC AUTO: 86.5 FL (ref 82.6–102.9)
MICROORGANISM SPEC CULT: ABNORMAL
MONOCYTES NFR BLD: 0.07 K/UL (ref 0.1–1.2)
MONOCYTES NFR BLD: 1 % (ref 3–12)
MORPHOLOGY: ABNORMAL
NEUTROPHILS NFR BLD: 81 % (ref 36–65)
NEUTS SEG NFR BLD: 5.36 K/UL (ref 1.5–8.1)
NRBC BLD-RTO: 0.4 PER 100 WBC
NUCLEATED RED BLOOD CELLS: 1 PER 100 WBC (ref 0–5)
PLATELET # BLD AUTO: 151 K/UL (ref 138–453)
PMV BLD AUTO: 9.4 FL (ref 8.1–13.5)
POTASSIUM SERPL-SCNC: 3.3 MMOL/L (ref 3.7–5.3)
PROT SERPL-MCNC: 5.6 G/DL (ref 6.4–8.3)
RBC # BLD AUTO: 2.96 M/UL (ref 3.95–5.11)
SODIUM SERPL-SCNC: 135 MMOL/L (ref 135–144)
SPECIMEN DESCRIPTION: ABNORMAL
WBC OTHER # BLD: 6.6 K/UL (ref 3.5–11.3)

## 2024-02-18 PROCEDURE — 97110 THERAPEUTIC EXERCISES: CPT

## 2024-02-18 PROCEDURE — 85014 HEMATOCRIT: CPT

## 2024-02-18 PROCEDURE — 86140 C-REACTIVE PROTEIN: CPT

## 2024-02-18 PROCEDURE — 1200000000 HC SEMI PRIVATE

## 2024-02-18 PROCEDURE — 2500000003 HC RX 250 WO HCPCS: Performed by: STUDENT IN AN ORGANIZED HEALTH CARE EDUCATION/TRAINING PROGRAM

## 2024-02-18 PROCEDURE — 85018 HEMOGLOBIN: CPT

## 2024-02-18 PROCEDURE — 6370000000 HC RX 637 (ALT 250 FOR IP): Performed by: STUDENT IN AN ORGANIZED HEALTH CARE EDUCATION/TRAINING PROGRAM

## 2024-02-18 PROCEDURE — 83735 ASSAY OF MAGNESIUM: CPT

## 2024-02-18 PROCEDURE — 2580000003 HC RX 258: Performed by: STUDENT IN AN ORGANIZED HEALTH CARE EDUCATION/TRAINING PROGRAM

## 2024-02-18 PROCEDURE — 80053 COMPREHEN METABOLIC PANEL: CPT

## 2024-02-18 PROCEDURE — 6360000002 HC RX W HCPCS: Performed by: STUDENT IN AN ORGANIZED HEALTH CARE EDUCATION/TRAINING PROGRAM

## 2024-02-18 PROCEDURE — 85025 COMPLETE CBC W/AUTO DIFF WBC: CPT

## 2024-02-18 PROCEDURE — 99232 SBSQ HOSP IP/OBS MODERATE 35: CPT | Performed by: INTERNAL MEDICINE

## 2024-02-18 PROCEDURE — 94761 N-INVAS EAR/PLS OXIMETRY MLT: CPT

## 2024-02-18 RX ADMIN — CALCIUM CARBONATE-VITAMIN D TAB 500 MG-200 UNIT 1 TABLET: 500-200 TAB at 15:30

## 2024-02-18 RX ADMIN — BENZONATATE 100 MG: 100 CAPSULE ORAL at 15:30

## 2024-02-18 RX ADMIN — METOPROLOL SUCCINATE 25 MG: 25 TABLET, EXTENDED RELEASE ORAL at 08:55

## 2024-02-18 RX ADMIN — METOPROLOL TARTRATE 2.5 MG: 5 INJECTION INTRAVENOUS at 18:20

## 2024-02-18 RX ADMIN — ATORVASTATIN CALCIUM 80 MG: 40 TABLET, FILM COATED ORAL at 20:16

## 2024-02-18 RX ADMIN — FAMOTIDINE 20 MG: 20 TABLET, FILM COATED ORAL at 20:16

## 2024-02-18 RX ADMIN — Medication 1000 UNITS: at 08:55

## 2024-02-18 RX ADMIN — CEPHALEXIN 500 MG: 500 CAPSULE ORAL at 00:13

## 2024-02-18 RX ADMIN — POTASSIUM CHLORIDE 40 MEQ: 1500 TABLET, EXTENDED RELEASE ORAL at 10:05

## 2024-02-18 RX ADMIN — SODIUM CHLORIDE, PRESERVATIVE FREE 10 ML: 5 INJECTION INTRAVENOUS at 20:17

## 2024-02-18 RX ADMIN — SODIUM CHLORIDE, PRESERVATIVE FREE 10 ML: 5 INJECTION INTRAVENOUS at 08:55

## 2024-02-18 RX ADMIN — METOPROLOL TARTRATE 2.5 MG: 5 INJECTION INTRAVENOUS at 09:57

## 2024-02-18 RX ADMIN — GUAIFENESIN 600 MG: 600 TABLET, EXTENDED RELEASE ORAL at 08:55

## 2024-02-18 RX ADMIN — REMDESIVIR 100 MG: 100 INJECTION, POWDER, LYOPHILIZED, FOR SOLUTION INTRAVENOUS at 09:05

## 2024-02-18 RX ADMIN — CALCIUM CARBONATE-VITAMIN D TAB 500 MG-200 UNIT 1 TABLET: 500-200 TAB at 08:55

## 2024-02-18 RX ADMIN — CEPHALEXIN 500 MG: 500 CAPSULE ORAL at 10:05

## 2024-02-18 RX ADMIN — ASPIRIN 81 MG: 81 TABLET, COATED ORAL at 08:55

## 2024-02-18 RX ADMIN — OXYCODONE HYDROCHLORIDE AND ACETAMINOPHEN 500 MG: 500 TABLET ORAL at 08:55

## 2024-02-18 RX ADMIN — CEPHALEXIN 500 MG: 500 CAPSULE ORAL at 17:19

## 2024-02-18 RX ADMIN — LISINOPRIL 20 MG: 20 TABLET ORAL at 08:55

## 2024-02-18 RX ADMIN — CEPHALEXIN 500 MG: 500 CAPSULE ORAL at 05:52

## 2024-02-18 RX ADMIN — FAMOTIDINE 20 MG: 20 TABLET, FILM COATED ORAL at 08:55

## 2024-02-18 RX ADMIN — OXYCODONE HYDROCHLORIDE AND ACETAMINOPHEN 500 MG: 500 TABLET ORAL at 20:16

## 2024-02-18 RX ADMIN — ZINC SULFATE 220 MG (50 MG) CAPSULE 50 MG: CAPSULE at 08:55

## 2024-02-18 RX ADMIN — GUAIFENESIN 600 MG: 600 TABLET, EXTENDED RELEASE ORAL at 20:16

## 2024-02-18 RX ADMIN — GUAIFENESIN AND DEXTROMETHORPHAN 5 ML: 100; 10 SYRUP ORAL at 13:54

## 2024-02-18 ASSESSMENT — PAIN SCALES - GENERAL: PAINLEVEL_OUTOF10: 0

## 2024-02-19 LAB
ABO/RH: NORMAL
ALBUMIN SERPL-MCNC: 2.5 G/DL (ref 3.5–5.2)
ALBUMIN/GLOB SERPL: 0.9 {RATIO} (ref 1–2.5)
ALP SERPL-CCNC: 93 U/L (ref 35–104)
ALT SERPL-CCNC: 22 U/L (ref 5–33)
ANION GAP SERPL CALCULATED.3IONS-SCNC: 8 MMOL/L (ref 9–17)
ANTIBODY SCREEN: NEGATIVE
ARM BAND NUMBER: NORMAL
AST SERPL-CCNC: 20 U/L
BASOPHILS # BLD: 0.04 K/UL (ref 0–0.2)
BASOPHILS NFR BLD: 1 % (ref 0–2)
BILIRUB SERPL-MCNC: 0.3 MG/DL (ref 0.3–1.2)
BLOOD BANK BLOOD PRODUCT EXPIRATION DATE: NORMAL
BLOOD BANK DISPENSE STATUS: NORMAL
BLOOD BANK DISPENSE STATUS: NORMAL
BLOOD BANK ISBT PRODUCT BLOOD TYPE: 5100
BLOOD BANK PRODUCT CODE: NORMAL
BLOOD BANK SAMPLE EXPIRATION: NORMAL
BLOOD BANK UNIT TYPE AND RH: NORMAL
BPU ID: NORMAL
BPU ID: NORMAL
BUN SERPL-MCNC: 10 MG/DL (ref 8–23)
BUN/CREAT SERPL: 20 (ref 9–20)
CALCIUM SERPL-MCNC: 8.3 MG/DL (ref 8.6–10.4)
CHLORIDE SERPL-SCNC: 104 MMOL/L (ref 98–107)
CO2 SERPL-SCNC: 28 MMOL/L (ref 20–31)
COMPONENT: NORMAL
COMPONENT: NORMAL
CREAT SERPL-MCNC: 0.5 MG/DL (ref 0.5–0.9)
CROSSMATCH RESULT: NORMAL
CROSSMATCH RESULT: NORMAL
CRP SERPL HS-MCNC: 119.5 MG/L (ref 0–5)
EOSINOPHIL # BLD: 0 K/UL (ref 0–0.44)
EOSINOPHILS RELATIVE PERCENT: 0 % (ref 1–4)
ERYTHROCYTE [DISTWIDTH] IN BLOOD BY AUTOMATED COUNT: 20.7 % (ref 11.8–14.4)
GFR SERPL CREATININE-BSD FRML MDRD: >60 ML/MIN/1.73M2
GLUCOSE SERPL-MCNC: 104 MG/DL (ref 70–99)
HCT VFR BLD AUTO: 24.8 % (ref 36.3–47.1)
HCT VFR BLD AUTO: 30.5 % (ref 36.3–47.1)
HGB BLD-MCNC: 7.7 G/DL (ref 11.9–15.1)
HGB BLD-MCNC: 9.4 G/DL (ref 11.9–15.1)
IMM GRANULOCYTES # BLD AUTO: 0 K/UL (ref 0–0.3)
IMM GRANULOCYTES NFR BLD: 0 %
INR PPP: 1.2
LYMPHOCYTES NFR BLD: 1.19 K/UL (ref 1.1–3.7)
LYMPHOCYTES RELATIVE PERCENT: 27 % (ref 24–43)
MAGNESIUM SERPL-MCNC: 1.5 MG/DL (ref 1.6–2.6)
MAGNESIUM SERPL-MCNC: 2 MG/DL (ref 1.6–2.6)
MCH RBC QN AUTO: 26.6 PG (ref 25.2–33.5)
MCHC RBC AUTO-ENTMCNC: 31 G/DL (ref 28.4–34.8)
MCV RBC AUTO: 85.8 FL (ref 82.6–102.9)
MONOCYTES NFR BLD: 0.22 K/UL (ref 0.1–1.2)
MONOCYTES NFR BLD: 5 % (ref 3–12)
MORPHOLOGY: ABNORMAL
MORPHOLOGY: ABNORMAL
NEUTROPHILS NFR BLD: 67 % (ref 36–65)
NEUTS SEG NFR BLD: 2.95 K/UL (ref 1.5–8.1)
NRBC BLD-RTO: 0 PER 100 WBC
PARTIAL THROMBOPLASTIN TIME: 29.4 SEC (ref 26.8–34.8)
PLATELET # BLD AUTO: 159 K/UL (ref 138–453)
PMV BLD AUTO: 9.9 FL (ref 8.1–13.5)
POTASSIUM SERPL-SCNC: 3.5 MMOL/L (ref 3.7–5.3)
PROT SERPL-MCNC: 5.3 G/DL (ref 6.4–8.3)
PROTHROMBIN TIME: 15.8 SEC (ref 11.9–14.8)
RBC # BLD AUTO: 2.89 M/UL (ref 3.95–5.11)
SODIUM SERPL-SCNC: 140 MMOL/L (ref 135–144)
TRANSFUSION STATUS: NORMAL
TRANSFUSION STATUS: NORMAL
UNIT DIVISION: 0
UNIT DIVISION: 0
UNIT ISSUE DATE/TIME: NORMAL
WBC OTHER # BLD: 4.4 K/UL (ref 3.5–11.3)

## 2024-02-19 PROCEDURE — 6370000000 HC RX 637 (ALT 250 FOR IP): Performed by: STUDENT IN AN ORGANIZED HEALTH CARE EDUCATION/TRAINING PROGRAM

## 2024-02-19 PROCEDURE — 36415 COLL VENOUS BLD VENIPUNCTURE: CPT

## 2024-02-19 PROCEDURE — 6360000002 HC RX W HCPCS: Performed by: STUDENT IN AN ORGANIZED HEALTH CARE EDUCATION/TRAINING PROGRAM

## 2024-02-19 PROCEDURE — P9016 RBC LEUKOCYTES REDUCED: HCPCS

## 2024-02-19 PROCEDURE — 85018 HEMOGLOBIN: CPT

## 2024-02-19 PROCEDURE — 99232 SBSQ HOSP IP/OBS MODERATE 35: CPT | Performed by: INTERNAL MEDICINE

## 2024-02-19 PROCEDURE — 1200000000 HC SEMI PRIVATE

## 2024-02-19 PROCEDURE — 97110 THERAPEUTIC EXERCISES: CPT

## 2024-02-19 PROCEDURE — 2580000003 HC RX 258: Performed by: STUDENT IN AN ORGANIZED HEALTH CARE EDUCATION/TRAINING PROGRAM

## 2024-02-19 PROCEDURE — 86850 RBC ANTIBODY SCREEN: CPT

## 2024-02-19 PROCEDURE — 94761 N-INVAS EAR/PLS OXIMETRY MLT: CPT

## 2024-02-19 PROCEDURE — 97116 GAIT TRAINING THERAPY: CPT

## 2024-02-19 PROCEDURE — 6370000000 HC RX 637 (ALT 250 FOR IP): Performed by: NURSE PRACTITIONER

## 2024-02-19 PROCEDURE — 83735 ASSAY OF MAGNESIUM: CPT

## 2024-02-19 PROCEDURE — 86900 BLOOD TYPING SEROLOGIC ABO: CPT

## 2024-02-19 PROCEDURE — 85730 THROMBOPLASTIN TIME PARTIAL: CPT

## 2024-02-19 PROCEDURE — 85025 COMPLETE CBC W/AUTO DIFF WBC: CPT

## 2024-02-19 PROCEDURE — 86901 BLOOD TYPING SEROLOGIC RH(D): CPT

## 2024-02-19 PROCEDURE — 85014 HEMATOCRIT: CPT

## 2024-02-19 PROCEDURE — 80053 COMPREHEN METABOLIC PANEL: CPT

## 2024-02-19 PROCEDURE — 86140 C-REACTIVE PROTEIN: CPT

## 2024-02-19 PROCEDURE — 36430 TRANSFUSION BLD/BLD COMPNT: CPT

## 2024-02-19 PROCEDURE — 6360000002 HC RX W HCPCS: Performed by: NURSE PRACTITIONER

## 2024-02-19 PROCEDURE — 85610 PROTHROMBIN TIME: CPT

## 2024-02-19 RX ORDER — MAGNESIUM SULFATE IN WATER 40 MG/ML
2000 INJECTION, SOLUTION INTRAVENOUS ONCE
Status: COMPLETED | OUTPATIENT
Start: 2024-02-19 | End: 2024-02-19

## 2024-02-19 RX ORDER — SODIUM CHLORIDE 9 MG/ML
INJECTION, SOLUTION INTRAVENOUS PRN
Status: DISCONTINUED | OUTPATIENT
Start: 2024-02-19 | End: 2024-02-20 | Stop reason: HOSPADM

## 2024-02-19 RX ORDER — GUAIFENESIN/DEXTROMETHORPHAN 100-10MG/5
10 SYRUP ORAL EVERY 4 HOURS PRN
Status: DISCONTINUED | OUTPATIENT
Start: 2024-02-19 | End: 2024-02-20 | Stop reason: HOSPADM

## 2024-02-19 RX ORDER — METOPROLOL SUCCINATE 50 MG/1
50 TABLET, EXTENDED RELEASE ORAL DAILY
Status: DISCONTINUED | OUTPATIENT
Start: 2024-02-19 | End: 2024-02-20 | Stop reason: HOSPADM

## 2024-02-19 RX ADMIN — REMDESIVIR 100 MG: 100 INJECTION, POWDER, LYOPHILIZED, FOR SOLUTION INTRAVENOUS at 09:39

## 2024-02-19 RX ADMIN — ZINC SULFATE 220 MG (50 MG) CAPSULE 50 MG: CAPSULE at 08:02

## 2024-02-19 RX ADMIN — OXYCODONE HYDROCHLORIDE AND ACETAMINOPHEN 500 MG: 500 TABLET ORAL at 08:02

## 2024-02-19 RX ADMIN — CALCIUM CARBONATE-VITAMIN D TAB 500 MG-200 UNIT 1 TABLET: 500-200 TAB at 08:02

## 2024-02-19 RX ADMIN — GUAIFENESIN 600 MG: 600 TABLET, EXTENDED RELEASE ORAL at 20:44

## 2024-02-19 RX ADMIN — POTASSIUM CHLORIDE 40 MEQ: 1500 TABLET, EXTENDED RELEASE ORAL at 09:35

## 2024-02-19 RX ADMIN — ASPIRIN 81 MG: 81 TABLET, COATED ORAL at 08:02

## 2024-02-19 RX ADMIN — CEPHALEXIN 500 MG: 500 CAPSULE ORAL at 00:45

## 2024-02-19 RX ADMIN — ATORVASTATIN CALCIUM 80 MG: 40 TABLET, FILM COATED ORAL at 20:44

## 2024-02-19 RX ADMIN — MAGNESIUM SULFATE HEPTAHYDRATE 2000 MG: 40 INJECTION, SOLUTION INTRAVENOUS at 10:19

## 2024-02-19 RX ADMIN — FAMOTIDINE 20 MG: 20 TABLET, FILM COATED ORAL at 20:44

## 2024-02-19 RX ADMIN — LISINOPRIL 20 MG: 20 TABLET ORAL at 08:02

## 2024-02-19 RX ADMIN — FAMOTIDINE 20 MG: 20 TABLET, FILM COATED ORAL at 08:02

## 2024-02-19 RX ADMIN — OXYCODONE HYDROCHLORIDE AND ACETAMINOPHEN 500 MG: 500 TABLET ORAL at 20:44

## 2024-02-19 RX ADMIN — SODIUM CHLORIDE, PRESERVATIVE FREE 10 ML: 5 INJECTION INTRAVENOUS at 20:44

## 2024-02-19 RX ADMIN — SODIUM CHLORIDE, PRESERVATIVE FREE 10 ML: 5 INJECTION INTRAVENOUS at 08:02

## 2024-02-19 RX ADMIN — CALCIUM CARBONATE-VITAMIN D TAB 500 MG-200 UNIT 1 TABLET: 500-200 TAB at 17:37

## 2024-02-19 RX ADMIN — GUAIFENESIN AND DEXTROMETHORPHAN 10 ML: 100; 10 SYRUP ORAL at 12:38

## 2024-02-19 RX ADMIN — CEPHALEXIN 500 MG: 500 CAPSULE ORAL at 06:42

## 2024-02-19 RX ADMIN — METOPROLOL SUCCINATE 50 MG: 50 TABLET, EXTENDED RELEASE ORAL at 08:02

## 2024-02-19 RX ADMIN — CEPHALEXIN 500 MG: 500 CAPSULE ORAL at 12:21

## 2024-02-19 RX ADMIN — CEPHALEXIN 500 MG: 500 CAPSULE ORAL at 17:37

## 2024-02-19 RX ADMIN — GUAIFENESIN 600 MG: 600 TABLET, EXTENDED RELEASE ORAL at 08:02

## 2024-02-19 RX ADMIN — Medication 1000 UNITS: at 08:02

## 2024-02-19 ASSESSMENT — PAIN SCALES - GENERAL
PAINLEVEL_OUTOF10: 0
PAINLEVEL_OUTOF10: 0

## 2024-02-19 NOTE — FLOWSHEET NOTE
Awake, resting  in bed. Vitals checked and assessment completed. Alert and oriented x 4 this morning. Denies pain currently. C/O an occasional cough this morning. Denies need for cough medication. Call light within reach reach. Continue to monitor

## 2024-02-19 NOTE — FLOWSHEET NOTE
Lab called and states patients HG of 7.7 does not qualify to receive a unit of PRBC's . HG has to be less than 7. S.Mey DUMONT made aware of the same.

## 2024-02-19 NOTE — ACP (ADVANCE CARE PLANNING)
Referred individual to Provider for additional questions/concerns   [] Advised patient/agent/surrogate to review completed ACP document and update if needed with changes in condition, patient preferences or care setting    [] This note routed to one or more involved healthcare providers    Liza Dai RN  [facility]   Palliative Care Nurse Coordinator  2/19/2024 10:30 AM

## 2024-02-20 VITALS
SYSTOLIC BLOOD PRESSURE: 127 MMHG | OXYGEN SATURATION: 96 % | HEART RATE: 96 BPM | DIASTOLIC BLOOD PRESSURE: 79 MMHG | WEIGHT: 168.2 LBS | TEMPERATURE: 98.3 F | HEIGHT: 66 IN | BODY MASS INDEX: 27.03 KG/M2 | RESPIRATION RATE: 18 BRPM

## 2024-02-20 LAB
ABO/RH: NORMAL
ALBUMIN SERPL-MCNC: 2.6 G/DL (ref 3.5–5.2)
ALBUMIN/GLOB SERPL: 0.9 {RATIO} (ref 1–2.5)
ALP SERPL-CCNC: 95 U/L (ref 35–104)
ALT SERPL-CCNC: 22 U/L (ref 5–33)
ANION GAP SERPL CALCULATED.3IONS-SCNC: 8 MMOL/L (ref 9–17)
ANTIBODY SCREEN: NEGATIVE
ARM BAND NUMBER: NORMAL
AST SERPL-CCNC: 18 U/L
BASOPHILS # BLD: 0 K/UL (ref 0–0.2)
BASOPHILS NFR BLD: 0 % (ref 0–2)
BILIRUB SERPL-MCNC: 0.4 MG/DL (ref 0.3–1.2)
BLOOD BANK BLOOD PRODUCT EXPIRATION DATE: NORMAL
BLOOD BANK DISPENSE STATUS: NORMAL
BLOOD BANK ISBT PRODUCT BLOOD TYPE: 5100
BLOOD BANK PRODUCT CODE: NORMAL
BLOOD BANK SAMPLE EXPIRATION: NORMAL
BLOOD BANK UNIT TYPE AND RH: NORMAL
BPU ID: NORMAL
BUN SERPL-MCNC: 10 MG/DL (ref 8–23)
BUN/CREAT SERPL: 20 (ref 9–20)
CALCIUM SERPL-MCNC: 8.4 MG/DL (ref 8.6–10.4)
CHLORIDE SERPL-SCNC: 101 MMOL/L (ref 98–107)
CO2 SERPL-SCNC: 28 MMOL/L (ref 20–31)
COMPONENT: NORMAL
CREAT SERPL-MCNC: 0.5 MG/DL (ref 0.5–0.9)
CROSSMATCH RESULT: NORMAL
EKG ATRIAL RATE: 99 BPM
EKG P AXIS: 39 DEGREES
EKG P-R INTERVAL: 132 MS
EKG Q-T INTERVAL: 352 MS
EKG QRS DURATION: 70 MS
EKG QTC CALCULATION (BAZETT): 451 MS
EKG R AXIS: 2 DEGREES
EKG T AXIS: 17 DEGREES
EKG VENTRICULAR RATE: 99 BPM
EOSINOPHIL # BLD: 0 K/UL (ref 0–0.44)
EOSINOPHILS RELATIVE PERCENT: 0 % (ref 1–4)
ERYTHROCYTE [DISTWIDTH] IN BLOOD BY AUTOMATED COUNT: 19.9 % (ref 11.8–14.4)
GFR SERPL CREATININE-BSD FRML MDRD: >60 ML/MIN/1.73M2
GLUCOSE SERPL-MCNC: 100 MG/DL (ref 70–99)
HCT VFR BLD AUTO: 29.4 % (ref 36.3–47.1)
HGB BLD-MCNC: 9.2 G/DL (ref 11.9–15.1)
IMM GRANULOCYTES # BLD AUTO: 0 K/UL (ref 0–0.3)
IMM GRANULOCYTES NFR BLD: 0 %
LYMPHOCYTES NFR BLD: 0.42 K/UL (ref 1.1–3.7)
LYMPHOCYTES RELATIVE PERCENT: 8 % (ref 24–43)
MCH RBC QN AUTO: 27.1 PG (ref 25.2–33.5)
MCHC RBC AUTO-ENTMCNC: 31.3 G/DL (ref 28.4–34.8)
MCV RBC AUTO: 86.7 FL (ref 82.6–102.9)
MONOCYTES NFR BLD: 0.85 K/UL (ref 0.1–1.2)
MONOCYTES NFR BLD: 16 % (ref 3–12)
MORPHOLOGY: NORMAL
NEUTROPHILS NFR BLD: 76 % (ref 36–65)
NEUTS SEG NFR BLD: 4.03 K/UL (ref 1.5–8.1)
NRBC BLD-RTO: 0 PER 100 WBC
PLATELET # BLD AUTO: 156 K/UL (ref 138–453)
PMV BLD AUTO: 9.3 FL (ref 8.1–13.5)
POTASSIUM SERPL-SCNC: 4.2 MMOL/L (ref 3.7–5.3)
PROT SERPL-MCNC: 5.6 G/DL (ref 6.4–8.3)
RBC # BLD AUTO: 3.39 M/UL (ref 3.95–5.11)
SODIUM SERPL-SCNC: 137 MMOL/L (ref 135–144)
TRANSFUSION STATUS: NORMAL
UNIT DIVISION: 0
UNIT ISSUE DATE/TIME: NORMAL
WBC OTHER # BLD: 5.3 K/UL (ref 3.5–11.3)

## 2024-02-20 PROCEDURE — 6370000000 HC RX 637 (ALT 250 FOR IP): Performed by: NURSE PRACTITIONER

## 2024-02-20 PROCEDURE — 97116 GAIT TRAINING THERAPY: CPT

## 2024-02-20 PROCEDURE — 85025 COMPLETE CBC W/AUTO DIFF WBC: CPT

## 2024-02-20 PROCEDURE — 93010 ELECTROCARDIOGRAM REPORT: CPT | Performed by: INTERNAL MEDICINE

## 2024-02-20 PROCEDURE — 93005 ELECTROCARDIOGRAM TRACING: CPT | Performed by: STUDENT IN AN ORGANIZED HEALTH CARE EDUCATION/TRAINING PROGRAM

## 2024-02-20 PROCEDURE — 99232 SBSQ HOSP IP/OBS MODERATE 35: CPT | Performed by: INTERNAL MEDICINE

## 2024-02-20 PROCEDURE — 97110 THERAPEUTIC EXERCISES: CPT

## 2024-02-20 PROCEDURE — 6370000000 HC RX 637 (ALT 250 FOR IP): Performed by: STUDENT IN AN ORGANIZED HEALTH CARE EDUCATION/TRAINING PROGRAM

## 2024-02-20 PROCEDURE — 2580000003 HC RX 258: Performed by: STUDENT IN AN ORGANIZED HEALTH CARE EDUCATION/TRAINING PROGRAM

## 2024-02-20 PROCEDURE — 80053 COMPREHEN METABOLIC PANEL: CPT

## 2024-02-20 PROCEDURE — 6360000002 HC RX W HCPCS: Performed by: STUDENT IN AN ORGANIZED HEALTH CARE EDUCATION/TRAINING PROGRAM

## 2024-02-20 PROCEDURE — 94761 N-INVAS EAR/PLS OXIMETRY MLT: CPT

## 2024-02-20 RX ORDER — HEPARIN 100 UNIT/ML
300 SYRINGE INTRAVENOUS PRN
Status: DISCONTINUED | OUTPATIENT
Start: 2024-02-20 | End: 2024-02-20 | Stop reason: HOSPADM

## 2024-02-20 RX ORDER — CEPHALEXIN 500 MG/1
500 CAPSULE ORAL 4 TIMES DAILY
Qty: 12 CAPSULE | Refills: 0 | Status: SHIPPED | OUTPATIENT
Start: 2024-02-20 | End: 2024-02-23

## 2024-02-20 RX ORDER — ACETAMINOPHEN 500 MG
1000 TABLET ORAL EVERY 6 HOURS PRN
Qty: 60 TABLET | Refills: 1
Start: 2024-02-20

## 2024-02-20 RX ORDER — GUAIFENESIN 600 MG/1
600 TABLET, EXTENDED RELEASE ORAL 2 TIMES DAILY
Qty: 14 TABLET | Refills: 0 | Status: SHIPPED | OUTPATIENT
Start: 2024-02-20 | End: 2024-02-27

## 2024-02-20 RX ORDER — ZINC SULFATE 50(220)MG
50 CAPSULE ORAL DAILY
Qty: 30 CAPSULE | Refills: 0 | COMMUNITY
Start: 2024-02-21 | End: 2024-03-01

## 2024-02-20 RX ORDER — METOPROLOL SUCCINATE 50 MG/1
50 TABLET, EXTENDED RELEASE ORAL DAILY
Qty: 30 TABLET | Refills: 3 | Status: SHIPPED | OUTPATIENT
Start: 2024-02-21

## 2024-02-20 RX ORDER — HEPARIN SODIUM,PORCINE/PF 10 UNIT/ML
3 SYRINGE (ML) INTRAVENOUS PRN
Status: DISCONTINUED | OUTPATIENT
Start: 2024-02-20 | End: 2024-02-20

## 2024-02-20 RX ORDER — ASCORBIC ACID 500 MG
500 TABLET ORAL 2 TIMES DAILY
Qty: 14 TABLET | Refills: 0 | COMMUNITY
Start: 2024-02-20 | End: 2024-02-27

## 2024-02-20 RX ORDER — BENZONATATE 100 MG/1
100 CAPSULE ORAL 3 TIMES DAILY PRN
Qty: 21 CAPSULE | Refills: 0 | Status: SHIPPED | OUTPATIENT
Start: 2024-02-20 | End: 2024-02-27

## 2024-02-20 RX ORDER — GUAIFENESIN/DEXTROMETHORPHAN 100-10MG/5
10 SYRUP ORAL EVERY 4 HOURS PRN
Qty: 120 ML | Refills: 0 | Status: SHIPPED | OUTPATIENT
Start: 2024-02-20 | End: 2024-03-01

## 2024-02-20 RX ADMIN — ZINC SULFATE 220 MG (50 MG) CAPSULE 50 MG: CAPSULE at 08:21

## 2024-02-20 RX ADMIN — CALCIUM CARBONATE-VITAMIN D TAB 500 MG-200 UNIT 1 TABLET: 500-200 TAB at 08:21

## 2024-02-20 RX ADMIN — METOPROLOL SUCCINATE 50 MG: 50 TABLET, EXTENDED RELEASE ORAL at 08:21

## 2024-02-20 RX ADMIN — CEPHALEXIN 500 MG: 500 CAPSULE ORAL at 00:49

## 2024-02-20 RX ADMIN — SODIUM CHLORIDE, PRESERVATIVE FREE 10 ML: 5 INJECTION INTRAVENOUS at 08:21

## 2024-02-20 RX ADMIN — SODIUM CHLORIDE, PRESERVATIVE FREE 10 ML: 5 INJECTION INTRAVENOUS at 16:48

## 2024-02-20 RX ADMIN — LISINOPRIL 20 MG: 20 TABLET ORAL at 08:21

## 2024-02-20 RX ADMIN — Medication 1000 UNITS: at 08:21

## 2024-02-20 RX ADMIN — GUAIFENESIN 600 MG: 600 TABLET, EXTENDED RELEASE ORAL at 08:21

## 2024-02-20 RX ADMIN — ASPIRIN 81 MG: 81 TABLET, COATED ORAL at 08:21

## 2024-02-20 RX ADMIN — CEPHALEXIN 500 MG: 500 CAPSULE ORAL at 17:20

## 2024-02-20 RX ADMIN — CEPHALEXIN 500 MG: 500 CAPSULE ORAL at 05:04

## 2024-02-20 RX ADMIN — CALCIUM CARBONATE-VITAMIN D TAB 500 MG-200 UNIT 1 TABLET: 500-200 TAB at 17:19

## 2024-02-20 RX ADMIN — Medication 300 UNITS: at 16:48

## 2024-02-20 RX ADMIN — FAMOTIDINE 20 MG: 20 TABLET, FILM COATED ORAL at 08:21

## 2024-02-20 RX ADMIN — OXYCODONE HYDROCHLORIDE AND ACETAMINOPHEN 500 MG: 500 TABLET ORAL at 08:21

## 2024-02-20 RX ADMIN — CEPHALEXIN 500 MG: 500 CAPSULE ORAL at 12:19

## 2024-02-20 NOTE — CARE COORDINATION
02/20/24 0758   Condition of Participation: Discharge Planning   The Plan for Transition of Care is related to the following treatment goals: Nursing for disease management & therapies to become stronger th her ADL's and to be independent   The Patient and/or Patient Representative was provided with a Choice of Provider? Patient   The Patient and/Or Patient Representative agree with the Discharge Plan? Yes   Freedom of Choice list was provided with basic dialogue that supports the patient's individualized plan of care/goals, treatment preferences, and shares the quality data associated with the providers?  No  (patient request the home health see had in the past. Did not want list.)     FLAVIA Arroyo

## 2024-02-20 NOTE — PLAN OF CARE
Facsimile Transmission Cover Sheet    Information contained in this transmission is for the sole use of the intended recipients and may contain confidential and privileged information. Any unauthorized review, use, disclosure or distribution is prohibited. If you are not the intended recipient, please contact the sender and destroy all copies of the original message.  Disclosure is made for the purpose of healthcare operations and continuity of care.      To: __Dr. German________________________    From: [facility]    Fax Number: [phone]    Sender:_JASSI Waterman RN _________________ Phone Number:_[phone]____________      This request is: Urgent - No    Information and/or questions regarding patient condition:    Dr. German -     Ms. Renetta Malcolm only takes her lisinopril once a day in the morning and takes her vitamin D supplement twice a day with meals.     Do you want us to correct it?     Thank you!   JASSI           Physician Signature, Date and Time needed for any orders written on this fax.  Thank you.    Signature_____________________ Date __________ Time _______    
  Problem: Chronic Conditions and Co-morbidities  Goal: Patient's chronic conditions and co-morbidity symptoms are monitored and maintained or improved  Outcome: Progressing  Flowsheets  Taken 2/17/2024 2127  Care Plan - Patient's Chronic Conditions and Co-Morbidity Symptoms are Monitored and Maintained or Improved: Monitor and assess patient's chronic conditions and comorbid symptoms for stability, deterioration, or improvement  Taken 2/17/2024 1838  Care Plan - Patient's Chronic Conditions and Co-Morbidity Symptoms are Monitored and Maintained or Improved: Monitor and assess patient's chronic conditions and comorbid symptoms for stability, deterioration, or improvement     Problem: Safety - Adult  Goal: Free from fall injury  Outcome: Progressing  Note: Bed alarm on, bed locked, side rails up, gripper socks on, call light within reach and able to use appropriately. Will continue to monitor this shift.     Problem: Respiratory - Adult  Goal: Achieves optimal ventilation and oxygenation  Outcome: Progressing  Flowsheets  Taken 2/17/2024 2127  Achieves optimal ventilation and oxygenation:   Assess for changes in respiratory status   Assess for changes in mentation and behavior  Taken 2/17/2024 1838  Achieves optimal ventilation and oxygenation: Assess for changes in respiratory status     Problem: Skin/Tissue Integrity - Adult  Goal: Skin integrity remains intact  Outcome: Progressing  Flowsheets  Taken 2/17/2024 2127  Skin Integrity Remains Intact: Monitor for areas of redness and/or skin breakdown  Taken 2/17/2024 1838  Skin Integrity Remains Intact: Monitor for areas of redness and/or skin breakdown  Note: Patient able to reposition self at this time and use call light appropriately for any assistance. Will continue to monitor this shift     Problem: Musculoskeletal - Adult  Goal: Return mobility to safest level of function  Outcome: Progressing  Flowsheets  Taken 2/17/2024 2127  Return Mobility to Safest Level of 
  Problem: Discharge Planning  Goal: Discharge to home or other facility with appropriate resources  2/20/2024 1624 by Callie Zarco RN  Outcome: Adequate for Discharge  2/20/2024 0744 by Oliva Bangura RN  Outcome: Progressing  2/20/2024 0340 by Sabas Waterman RN  Outcome: Progressing  Flowsheets (Taken 2/20/2024 0340)  Discharge to home or other facility with appropriate resources:   Identify barriers to discharge with patient and caregiver   Identify discharge learning needs (meds, wound care, etc)   Refer to discharge planning if patient needs post-hospital services based on physician order or complex needs related to functional status, cognitive ability or social support system   Arrange for needed discharge resources and transportation as appropriate   Arrange for interpreters to assist at discharge as needed     Problem: Chronic Conditions and Co-morbidities  Goal: Patient's chronic conditions and co-morbidity symptoms are monitored and maintained or improved  2/20/2024 1624 by Callie Zarco RN  Outcome: Adequate for Discharge  2/20/2024 0744 by Oliva Bangura RN  Outcome: Progressing  2/20/2024 0340 by Sabas Waterman RN  Outcome: Progressing  Flowsheets (Taken 2/20/2024 0340)  Care Plan - Patient's Chronic Conditions and Co-Morbidity Symptoms are Monitored and Maintained or Improved:   Monitor and assess patient's chronic conditions and comorbid symptoms for stability, deterioration, or improvement   Collaborate with multidisciplinary team to address chronic and comorbid conditions and prevent exacerbation or deterioration   Update acute care plan with appropriate goals if chronic or comorbid symptoms are exacerbated and prevent overall improvement and discharge     Problem: Safety - Adult  Goal: Free from fall injury  2/20/2024 1624 by Callie Zarco RN  Outcome: Adequate for Discharge  2/20/2024 0744 by Oliva Bangura RN  Outcome: Progressing  2/20/2024 0340 by Sabas Waterman 
  Problem: Discharge Planning  Goal: Discharge to home or other facility with appropriate resources  Outcome: Progressing  Flowsheets (Taken 2/20/2024 0340)  Discharge to home or other facility with appropriate resources:   Identify barriers to discharge with patient and caregiver   Identify discharge learning needs (meds, wound care, etc)   Refer to discharge planning if patient needs post-hospital services based on physician order or complex needs related to functional status, cognitive ability or social support system   Arrange for needed discharge resources and transportation as appropriate   Arrange for interpreters to assist at discharge as needed     Problem: Chronic Conditions and Co-morbidities  Goal: Patient's chronic conditions and co-morbidity symptoms are monitored and maintained or improved  Outcome: Progressing  Flowsheets (Taken 2/20/2024 0340)  Care Plan - Patient's Chronic Conditions and Co-Morbidity Symptoms are Monitored and Maintained or Improved:   Monitor and assess patient's chronic conditions and comorbid symptoms for stability, deterioration, or improvement   Collaborate with multidisciplinary team to address chronic and comorbid conditions and prevent exacerbation or deterioration   Update acute care plan with appropriate goals if chronic or comorbid symptoms are exacerbated and prevent overall improvement and discharge     Problem: Safety - Adult  Goal: Free from fall injury  Outcome: Progressing  Flowsheets (Taken 2/20/2024 0340)  Free From Fall Injury:   Instruct family/caregiver on patient safety   Based on caregiver fall risk screen, instruct family/caregiver to ask for assistance with transferring infant if caregiver noted to have fall risk factors     Problem: Respiratory - Adult  Goal: Achieves optimal ventilation and oxygenation  Outcome: Progressing  Flowsheets (Taken 2/20/2024 0340)  Achieves optimal ventilation and oxygenation:   Assess for changes in respiratory status   
2152)  Skin Integrity Remains Intact: Monitor for areas of redness and/or skin breakdown     Problem: Musculoskeletal - Adult  Goal: Return mobility to safest level of function  Outcome: Progressing  Flowsheets (Taken 2/15/2024 2152)  Return Mobility to Safest Level of Function:   Assess patient stability and activity tolerance for standing, transferring and ambulating with or without assistive devices   Assist with transfers and ambulation using safe patient handling equipment as needed   Ensure adequate protection for wounds/incisions during mobilization   Obtain physical therapy/occupational therapy consults as needed   Apply continuous passive motion per provider or physical therapy orders to increase flexion toward goal   Instruct patient/family in ordered activity level     Problem: Gastrointestinal - Adult  Goal: Maintains or returns to baseline bowel function  Outcome: Progressing  Flowsheets (Taken 2/15/2024 2152)  Maintains or returns to baseline bowel function:   Assess bowel function   Administer IV fluids as ordered to ensure adequate hydration   Encourage mobilization and activity   Encourage oral fluids to ensure adequate hydration   Administer ordered medications as needed   Nutrition consult to assist patient with appropriate food choices  Goal: Maintains adequate nutritional intake  Outcome: Progressing  Flowsheets (Taken 2/15/2024 2152)  Maintains adequate nutritional intake:   Monitor percentage of each meal consumed   Identify factors contributing to decreased intake, treat as appropriate   Assist with meals as needed   Monitor intake and output, weight and lab values     Problem: Infection - Adult  Goal: Absence of infection at discharge  Outcome: Progressing  Flowsheets (Taken 2/15/2024 2152)  Absence of infection at discharge:   Assess and monitor for signs and symptoms of infection   Monitor all insertion sites i.e., indwelling lines, tubes and drains   Monitor lab/diagnostic results   
ordered to ensure adequate hydration   Encourage mobilization and activity   Encourage oral fluids to ensure adequate hydration   Administer ordered medications as needed   Nutrition consult to assist patient with appropriate food choices  Goal: Maintains adequate nutritional intake  Outcome: Progressing  Flowsheets (Taken 2/19/2024 0458)  Maintains adequate nutritional intake:   Monitor percentage of each meal consumed   Identify factors contributing to decreased intake, treat as appropriate   Assist with meals as needed   Obtain nutritional consult as needed   Monitor intake and output, weight and lab values     Problem: Infection - Adult  Goal: Absence of infection at discharge  Outcome: Progressing  Flowsheets (Taken 2/19/2024 0458)  Absence of infection at discharge:   Assess and monitor for signs and symptoms of infection   Monitor all insertion sites i.e., indwelling lines, tubes and drains   [place] appropriate cooling/warming therapies per order   Instruct and encourage patient and family to use good hand hygiene technique   Monitor lab/diagnostic results   Monitor endotracheal (as able) and nasal secretions for changes in amount and color   Administer medications as ordered   Identify and instruct in appropriate isolation precautions for identified infection/condition     Problem: Metabolic/Fluid and Electrolytes - Adult  Goal: Hemodynamic stability and optimal renal function maintained  Outcome: Progressing  Flowsheets (Taken 2/19/2024 0458)  Hemodynamic stability and optimal renal function maintained:   Monitor labs and assess for signs and symptoms of volume excess or deficit   Monitor intake, output and patient weight   Monitor urine specific gravity, serum osmolarity and serum sodium as indicated or ordered   Monitor response to interventions for patient's volume status, including labs, urine output, blood pressure (other measures as available)   Encourage oral intake as appropriate   Instruct 
renal function maintained: Monitor labs and assess for signs and symptoms of volume excess or deficit     Problem: Hematologic - Adult  Goal: Maintains hematologic stability  Outcome: Progressing  Flowsheets  Taken 2/16/2024 1941  Maintains hematologic stability: Assess for signs and symptoms of bleeding or hemorrhage  Taken 2/16/2024 1923  Maintains hematologic stability: Assess for signs and symptoms of bleeding or hemorrhage     Problem: Pain  Goal: Verbalizes/displays adequate comfort level or baseline comfort level  Outcome: Progressing  Flowsheets  Taken 2/16/2024 1941  Verbalizes/displays adequate comfort level or baseline comfort level:   Encourage patient to monitor pain and request assistance   Assess pain using appropriate pain scale   Administer analgesics based on type and severity of pain and evaluate response   Implement non-pharmacological measures as appropriate and evaluate response  Taken 2/16/2024 1923  Verbalizes/displays adequate comfort level or baseline comfort level: Encourage patient to monitor pain and request assistance  Note: Patient denies any pain at this time. Will continue to monitor this shift.     Problem: Nutrition Deficit:  Goal: Optimize nutritional status  2/16/2024 1941 by Sanna Che RN  Outcome: Progressing  Flowsheets (Taken 2/16/2024 1941)  Nutrient intake appropriate for improving, restoring, or maintaining nutritional needs: Assess nutritional status and recommend course of action  Note: Encouraging foods and liquids as tolerated. Will continue to monitor this shift.     
technique   Monitor lab/diagnostic results   Monitor endotracheal (as able) and nasal secretions for changes in amount and color   Administer medications as ordered   Identify and instruct in appropriate isolation precautions for identified infection/condition     Problem: Metabolic/Fluid and Electrolytes - Adult  Goal: Hemodynamic stability and optimal renal function maintained  2/20/2024 0744 by Oliva Bangura RN  Outcome: Progressing  2/20/2024 0340 by Sabas Waterman RN  Outcome: Progressing  Flowsheets (Taken 2/20/2024 0340)  Hemodynamic stability and optimal renal function maintained:   Monitor labs and assess for signs and symptoms of volume excess or deficit   Monitor intake, output and patient weight   Monitor urine specific gravity, serum osmolarity and serum sodium as indicated or ordered   Monitor response to interventions for patient's volume status, including labs, urine output, blood pressure (other measures as available)   Encourage oral intake as appropriate   Instruct patient on fluid and nutrition restrictions as appropriate     Problem: Hematologic - Adult  Goal: Maintains hematologic stability  2/20/2024 0744 by Oliva Bangura RN  Outcome: Progressing  2/20/2024 0340 by Sabas Waterman RN  Outcome: Progressing  Flowsheets (Taken 2/20/2024 0340)  Maintains hematologic stability:   Assess for signs and symptoms of bleeding or hemorrhage   Monitor labs for bleeding or clotting disorders   Administer blood products/factors as ordered     Problem: Pain  Goal: Verbalizes/displays adequate comfort level or baseline comfort level  2/20/2024 0744 by Oliva Bangura RN  Outcome: Progressing  2/20/2024 0340 by Sabas Waterman RN  Outcome: Progressing  Flowsheets (Taken 2/20/2024 0340)  Verbalizes/displays adequate comfort level or baseline comfort level:   Encourage patient to monitor pain and request assistance   Administer analgesics based on type and severity of pain and evaluate response

## 2024-02-20 NOTE — PROGRESS NOTES
[address] , [place], [place], [address]    Progress Note    Date:   2/16/2024  Patient name:  Renetta Malcolm  Date of admission:  2/15/2024  5:30 AM  MRN:   [id number]  YOB: 1950    SUBJECTIVE/Last 24 hours update:     Patient seen and examined at the bed side , no new acute events overnight except that her hemoglobin dropped requiring 1U pRBC and no new complains. Notes from nursing staff and Consults had been reviewed, and the overnight progress had been checked with the nursing staff as well.    REVIEW OF SYSTEMS:      CONSTITUTIONAL:  no fevers, no headcahes  EYES: negative for blury vision  HEENT: No headaches, No nasal congestion, no difficulty swallowing  RESPIRATORY:negative for dyspnea, no wheezing, no Cough  CARDIOVASCULAR: negative for chest pain, no palpitations  GASTROINTESTINAL: no nausea, no vomiting, no change in bowel habits, no abdominal pain   GENITOURINARY: negative for dysuria, no hematuria   MUSCULOSKELETAL: no joint pains, no muscle aches, no swelling of joints or extremities  NEUROLOGICAL: generalized weakness, no numbness      PAST MEDICAL HISTORY:      has a past medical history of Arthritis, Cataract, Cerebral artery occlusion with cerebral infarction (HCC), Hypertension, Wears dentures, and Wellness examination.    PAST SURGICAL HISTORY:      has a past surgical history that includes Carpal tunnel release (Bilateral); Cataract removal (Bilateral); Insertable Cardiac Monitor; Total hip arthroplasty (Right); Total knee arthroplasty (Bilateral); Laparoscopic hysterectomy (Bilateral, 10/25/2023); Hysterectomy (N/A, 10/25/2023); and IR PORT PLACEMENT > 5 YEARS (12/13/2023).       SOCIAL HISTORY:      reports that she has never smoked. She has never used smokeless tobacco. She reports that she does not currently use alcohol. She reports that she does not use drugs.    TOBACCO:   reports that she has never smoked. She has never used smokeless 
Blood administration complete- vitals obtained at this time. See flow sheet for details.   
Blood transfusion initiated at this time. Patient resting in bed comfortably. Writer at bedside.  
Call to Dr German regarding patient's hemoglobin of 7.0. See orders.  
Chai Demarco M.D.  Internal Medicine Progress Note    Patient: Renetta Malcolm  Date of Admission: 2/15/2024  5:30 AM  Date of Evaluation: 2/17/2024      SUBJECTIVE:    Renetta Malcolm is a 73 y.o. female who was seen today for follow up of Acute COVID-19.  She is feeling a little better today.  She is still coughing quite a bit which is sometimes productive.  She denies fever or chills and has been afebrile.  She is tolerating diet without any nausea, vomiting or diarrhea.    ROS:   Constitutional: negative  for fevers, and negative for chills.  Respiratory: negative for shortness of breath, positive for cough, and negative for wheezing  Cardiovascular: negative for chest pain, and negative for palpitations  Gastrointestinal: negative for abdominal pain, negative for nausea,negative for vomiting, negative for diarrhea, and negative for constipation     All other systems were reviewed with the patient and are negative unless otherwise stated in HPI    -----------------------------------------------------------------  OBJECTIVE:  Vitals:   Temp: 98.1 °F (36.7 °C)  BP: (!) 144/94  Respirations: 20  Pulse: (!) 135  SpO2: 97 % on room air    Weight  Wt Readings from Last 3 Encounters:   02/17/24 76.8 kg (169 lb 6.4 oz)   02/06/24 73.9 kg (163 lb)   01/31/24 74.4 kg (164 lb)     Body mass index is 27.34 kg/m².    24HR INTAKE/OUTPUT:      Intake/Output Summary (Last 24 hours) at 2/17/2024 1245  Last data filed at 2/17/2024 1206  Gross per 24 hour   Intake 3053.89 ml   Output 250 ml   Net 2803.89 ml       Exam:  GEN:    Awake, alert and oriented x 3.   EYES:  EOMI, pupils equal   NECK: Supple. No lymphadenopathy.  No carotid bruit  CVS:    regular rate and rhythm, no audible murmur  PULM:  diminished but clear without wheezing, rales or rhonchi, no acute respiratory distress  ABD:    Bowels sounds normal.  Abdomen is soft.  No distention.  no tenderness to palpation.   EXT:   no edema bilaterally .  No calf 
Comprehensive Nutrition Assessment    Type and Reason for Visit:  Reassess    Nutrition Recommendations/Plan:   Continue current diet.      Malnutrition Assessment:  Malnutrition Status:  Insufficient data (02/16/24 0940)    Context:  Acute Illness     Findings of the 6 clinical characteristics of malnutrition:  Energy Intake:  No significant decrease in energy intake  Weight Loss:  No significant weight loss     Body Fat Loss:  Unable to assess     Muscle Mass Loss:  Unable to assess    Fluid Accumulation:  No significant fluid accumulation     Strength:  Not Performed    Nutrition Assessment:    Improving nutrition related labs aeb Hgb 9.2. Glucose 100, A1c 6.0-good glycemic control. corrected calcium 9.52. Pt with COVID, on vitamin C, vitamin D, and zinc to aid recovery from COVID. Py reports she is \"doing well.\" She denied any nutritional concerns at this time.    Nutrition Related Findings:    unable to assess due to droplet precautions Wound Type: None       Current Nutrition Intake & Therapies:    Average Meal Intake: %  Average Supplements Intake: None Ordered  ADULT DIET; Regular    Anthropometric Measures:  Height: 167.6 cm (5' 6\")  Ideal Body Weight (IBW): 130 lbs (59 kg)       Current Body Weight: 76.3 kg (168 lb 3.2 oz), 125.3 % IBW. Weight Source: Bed Scale  Current BMI (kg/m2): 27.2  Usual Body Weight: 77.6 kg (171 lb)  % Weight Change (Calculated): -4.7  Weight Adjustment For: No Adjustment                 BMI Categories: Overweight (BMI 25.0-29.9)    Estimated Daily Nutrient Needs:  Energy Requirements Based On: Kcal/kg  Weight Used for Energy Requirements: Current  Energy (kcal/day): 5404-3182 (25-28/kg)  Weight Used for Protein Requirements: Ideal  Protein (g/day): 83-94g (1.4-1.6g/kg)  Method Used for Fluid Requirements: 1 ml/kcal  Fluid (ml/day): 1,900 ml  Hematology:  Recent Labs     02/18/24  0543 02/18/24  1345 02/19/24  0540 02/19/24  1948 02/20/24  0505   WBC 6.6  --  4.4  --  5.3 
Covid-19 Progress Note    SUBJECTIVE:    Patient seen for f/u of Acute COVID-19.  She resting in bed no distress. No hypoxia. Feels better and near normal.  No concerns      ROS:   Constitutional: negative  for fevers, and negative for chills.  Respiratory: negative for shortness of breath, positive for cough, and negative for wheezing  Cardiovascular: negative for chest pain, and negative for palpitations  Gastrointestinal: negative for abdominal pain, negative for nausea,negative for vomiting, negative for diarrhea, and negative for constipation     All other systems were reviewed with the patient and are negative unless otherwise stated in HPI      OBJECTIVE:      Vitals:   Vitals:    02/20/24 0645   BP: 127/79   Pulse: 96   Resp:    Temp: 98.3 °F (36.8 °C)   SpO2: 96%     Weight - Scale: 76.3 kg (168 lb 3.2 oz)   Height: 167.6 cm (5' 6\")     PaO2/FiO2 RATIO:  No results for input(s): \"POCPO2\" in the last 72 hours.           Weight  Wt Readings from Last 3 Encounters:   02/20/24 76.3 kg (168 lb 3.2 oz)   02/06/24 73.9 kg (163 lb)   01/31/24 74.4 kg (164 lb)     Body mass index is 27.15 kg/m².    24HR INTAKE/OUTPUT:      Intake/Output Summary (Last 24 hours) at 2/20/2024 0658  Last data filed at 2/20/2024 0544  Gross per 24 hour   Intake 811.67 ml   Output 2450 ml   Net -1638.33 ml     -----------------------------------------------------------------  Exam:    GEN:    Awake, alert and oriented x3.   EYES:  EOMI, pupils equal   NECK: Supple. No lymphadenopathy.  No carotid bruit  CVS:    regular but tachycardic, no audible murmur  PULM:  diminished but clear without wheezing, rales or rhonchi, no acute respiratory distress  ABD:    Bowels sounds normal.  Abdomen is soft.  No distention.  no tenderness to palpation.   EXT:   no edema bilaterally .  No calf tenderness.   NEURO: Moves all extremities.  Motor and sensory are grossly intact  SKIN:  No rashes.  No skin lesions.  
Dr. German and Mayelin NP at bedside  
Evening medications given at this time per orders. Patient took medications one and a time with no issues noted. Patient refused HS snack. Denies any further needs or concerns at this time. Call light and over bed table within reach. Side rails up times two.  
Follow up with the patient regarding discharge plan.  Patient has decided on home health for her follow up care for nursing and therapies.  She wants [facility] home care because she had them in the past.  Referral made to home health.  FLAVIA Arroyo    
Hemo/Onc consult called to [facility], spoke with Fatemeh. Will continue to monitor.   
I talk with patient concerning her advance directives, she tells me that he son believes that she has completed them but she is unsure where they are.  She is a  and has 3 sons and her daughter in law Janis who is a radiology tech at [employer].  I explain the legal hierarchy for the [place] [facility] [place] to her and explain it would be the majority rule of her 3 sons for decisions for health care.  I give her information to complete them when she is feeling better as an outpatient.  I have the ACP conversation with her and she tells me that she would want to be resuscitated but would not want to \"be a vegetable on machines\".  We talk about the importance of letting her children know her wishes, she tells me that she has told Janis, but will tell her sons.  She denies any further questions or needs at this time.  Liza Dai RN  [facility]   Palliative Care Nurse Coordinator  2/19/2024 10:26 AM    
ID consult sent to Dr. Diane, via [employer]. Await reply  
Notified STERLING Bennett of critical hemoglobin. Sabrina in to see patient.   
Occupational Therapy  Facility/Department: [facility] MED SURG  Daily Treatment Note  NAME: Renetta Malcolm  : 1950  MRN: [id number]    Date of Service: 2024    Discharge Recommendations:  Continue to assess pending progress, Home with assist PRN         Patient Diagnosis(es): The primary encounter diagnosis was COVID-19 virus infection. Diagnoses of Anemia due to antineoplastic chemotherapy and Hypokalemia were also pertinent to this visit.     Assessment    Activity Tolerance: Patient limited by endurance;Patient limited by fatigue  Discharge Recommendations: Continue to assess pending progress;Home with assist PRN      Plan   Occupational Therapy Plan  Times Per Day: Once a day  Days Per Week: 7 Days  Current Treatment Recommendations: Strengthening;Functional mobility training;Endurance training;Safety education & training;Patient/Caregiver education & training;Self-Care / ADL;Home management training;Other (comment)     Restrictions  Restrictions/Precautions  Restrictions/Precautions: General Precautions;Isolation;Fall Risk    Subjective   Subjective  Subjective: Pt just finishing self care upon arrival. with nurse aid present. pt agreed to participate in therapy session.  Pain: Pt had no complaints of pain.  Pain: denies           Objective    Vitals             OT Exercises  Exercise Treatment: Pt completed BUE ther ex x 6 planes x 20 reps x 1 set to increase UE strength and endurance in order to ease completion of ADL tasks. Pt required RBs as needed secondary to fatigue.     Safety Devices  Type of Devices: Left in chair;Chair alarm in place;Call light within reach;Patient at risk for falls;All fall risk precautions in place;Gait belt;Nurse notified     Patient Education  Education Given To: Patient  Education Provided: Role of Therapy;Plan of Care;Home Exercise Program  Education Method: Verbal  Barriers to Learning: None  Education Outcome: Verbalized understanding    Goals  Short Term 
Occupational Therapy  Facility/Department: [facility] MED SURG  Daily Treatment Note  NAME: Renetta Malcolm  : 1950  MRN: [id number]    Date of Service: 2024    Discharge Recommendations:  Continue to assess pending progress, Home with assist PRN         Patient Diagnosis(es): The primary encounter diagnosis was COVID-19 virus infection. Diagnoses of Anemia due to antineoplastic chemotherapy and Hypokalemia were also pertinent to this visit.     Assessment    Assessment: Treatment session limited d/t pt's lunch arriving and pt fatigued.  Activity Tolerance: Patient limited by endurance;Patient limited by fatigue  Discharge Recommendations: Continue to assess pending progress;Home with assist PRN      Plan   Occupational Therapy Plan  Times Per Day: Once a day  Days Per Week: 7 Days  Current Treatment Recommendations: Strengthening;Functional mobility training;Endurance training;Safety education & training;Patient/Caregiver education & training;Self-Care / ADL;Home management training;Other (comment)     Restrictions  Restrictions/Precautions  Restrictions/Precautions: General Precautions;Isolation;Fall Risk    Subjective   Subjective  Subjective: Pt walking into the bathroom with nurses aid upon arrival.  Pain: Pt had no complaints of pain.          Objective    Vitals       ADL  Toileting: Minimal assistance  Toileting Skilled Clinical Factors: Min A to complete domo care and clothing mgmt.  Functional Mobility: Minimal assistance  Functional Mobility Skilled Clinical Factors: Min A to complete toilet transfer. Min A to complete func mob within room with RW.  Skin Care: Bath wipes;Protective barrier  OT Exercises  Exercise Treatment: Pt decline ther ex this date.     Safety Devices  Type of Devices: Left in chair;Chair alarm in place;Call light within reach;Patient at risk for falls;All fall risk precautions in place;Gait belt;Nurse notified  Restraints  Restraints Initially in Place: No     Patient 
Occupational Therapy  Facility/Department: [facility] MED SURG  Occupational Therapy Initial Assessment    Name: Renetta Malcolm  : 1950  MRN: [id number]  Date of Service: 2/15/2024    Discharge Recommendations:  Continue to assess pending progress, Home with assist PRN  OT Equipment Recommendations  Equipment Needed: Yes  Mobility Devices: Walker  Walker: Rolling     Patient Diagnosis(es): The primary encounter diagnosis was COVID-19 virus infection. Diagnoses of Anemia due to antineoplastic chemotherapy and Hypokalemia were also pertinent to this visit.  Past Medical History:  has a past medical history of Arthritis, Cataract, Cerebral artery occlusion with cerebral infarction (HCC), Hypertension, Wears dentures, and Wellness examination.  Past Surgical History:  has a past surgical history that includes Carpal tunnel release (Bilateral); Cataract removal (Bilateral); Insertable Cardiac Monitor; Total hip arthroplasty (Right); Total knee arthroplasty (Bilateral); Laparoscopic hysterectomy (Bilateral, 10/25/2023); Hysterectomy (N/A, 10/25/2023); and IR PORT PLACEMENT > 5 YEARS (2023).    Treatment Diagnosis: Generalized weakness    Assessment   Performance deficits / Impairments: Decreased functional mobility ;Decreased safe awareness;Decreased balance;Decreased ADL status;Decreased posture;Decreased endurance;Decreased high-level IADLs;Decreased strength  Assessment: Pt is 72 yo female presenting to [facility] d/t acute COVID-19. Pt demo decreased strength and endurance during functional mobility/transfers for ADL tasks. Pt would benefit from skilled OT to increase strength, endurance, and safety awareness using energy conservation strategies to engage in functional mobility/transfers and ADL tasks.  Treatment Diagnosis: Generalized weakness  Prognosis: Fair  Decision Making: Medium Complexity  REQUIRES OT FOLLOW-UP: Yes        Plan   Occupational Therapy Plan  Times Per Day: Once a day  Days Per Week: 7 
Occupational Therapy  Facility/Department: [facility] MED SURG  Daily Treatment Note  NAME: Renetta Malcolm  : 1950  MRN: [id number]    Date of Service: 2024    Discharge Recommendations:  Continue to assess pending progress, Home with assist PRN         Patient Diagnosis(es): The primary encounter diagnosis was COVID-19 virus infection. Diagnoses of Anemia due to antineoplastic chemotherapy and Hypokalemia were also pertinent to this visit.     Assessment    Activity Tolerance: Patient limited by fatigue;Patient limited by endurance  Discharge Recommendations: Continue to assess pending progress;Home with assist PRN      Plan   Occupational Therapy Plan  Times Per Day: Once a day  Days Per Week: 7 Days  Current Treatment Recommendations: Strengthening;Functional mobility training;Endurance training;Safety education & training;Patient/Caregiver education & training;Self-Care / ADL;Home management training;Other (comment)     Restrictions  Restrictions/Precautions  Restrictions/Precautions: General Precautions;Isolation;Fall Risk    Subjective   Subjective  Subjective: Pt lying in bed upon arrival. pt agreed to participate in therapy session.  Pain: Pt had no complaints of pain.             Objective    Vitals             OT Exercises  Exercise Treatment: Pt completed BUE ther ex with 1# dumbbell x 6 planes x 15 reps x 1 set to increase UE strength and endurance in order to ease completion of ADL tasks. Pt required RBs as needed secondary to fatigue.     Safety Devices  Type of Devices: Call light within reach;Left in bed;Nurse notified     Patient Education  Education Given To: Patient  Education Provided: Role of Therapy;Plan of Care;Home Exercise Program  Education Method: Verbal  Barriers to Learning: None  Education Outcome: Verbalized understanding    Goals  Short Term Goals  Time Frame for Short Term Goals: 21 visits  Short Term Goal 1: Pt will safely complete UB/LB dressing Mod(I) for ADLs at 
Occupational Therapy  Facility/Department: [facility] MED SURG  Daily Treatment Note  NAME: Renetta Malcolm  : 1950  MRN: [id number]    Date of Service: 2024    Discharge Recommendations:  Continue to assess pending progress, Home with assist PRN         Patient Diagnosis(es): The primary encounter diagnosis was COVID-19 virus infection. Diagnoses of Anemia due to antineoplastic chemotherapy and Hypokalemia were also pertinent to this visit.     Assessment    Activity Tolerance: Patient tolerated treatment well;Patient limited by endurance  Discharge Recommendations: Continue to assess pending progress;Home with assist PRN      Plan   Occupational Therapy Plan  Times Per Day: Once a day  Days Per Week: 7 Days  Current Treatment Recommendations: Strengthening;Functional mobility training;Endurance training;Safety education & training;Patient/Caregiver education & training;Self-Care / ADL;Home management training;Other (comment)     Restrictions  Restrictions/Precautions  Restrictions/Precautions: General Precautions;Isolation;Fall Risk    Subjective   Subjective  Subjective: Pt sitting up in bedside chair upon arrival. pt agreed to participate in therapy session.  Pain: Pt had no complaints of pain.          Objective    Vitals             OT Exercises  Exercise Treatment: Pt completed BUE ther ex with 1# dumbbell  x 7 planes x 20 reps x 1 set to increase UE strength and endurance in order to ease completion of ADL tasks. Pt required RBs as needed secondary to fatigue.     Safety Devices  Type of Devices: Left in chair;Call light within reach;Patient at risk for falls;All fall risk precautions in place;Nurse notified;Chair alarm in place     Patient Education  Education Given To: Patient  Education Provided: Role of Therapy;Plan of Care;Home Exercise Program  Education Method: Verbal  Barriers to Learning: None  Education Outcome: Verbalized understanding    Goals  Short Term Goals  Time Frame for Short Term 
Occupational Therapy  Facility/Department: [facility] MED SURG  Daily Treatment Note  NAME: Renetta Malcolm  : 1950  MRN: [id number]    Date of Service: 2024    Discharge Recommendations:  Continue to assess pending progress, Home with assist PRN         Patient Diagnosis(es): The primary encounter diagnosis was COVID-19 virus infection. Diagnoses of Anemia due to antineoplastic chemotherapy and Hypokalemia were also pertinent to this visit.     Assessment    Activity Tolerance: Patient tolerated treatment well      Plan   Occupational Therapy Plan  Times Per Day: Once a day  Days Per Week: 7 Days     Restrictions   Droplet/fall risk    Subjective   Subjective  Subjective: Pt seated in recliner upon arrival.  Pt requested toileting. Pt agreed to BUE therex  Pain: Pt denied pain at this time           Objective    Vitals           ADL  Grooming: Minimal assistance  Grooming Skilled Clinical Factors: MIN A Pt  able to complete oral hygine while standing at sink x ~ 2 min unilaterally supported with MIN LOB  Toileting: Stand by assistance  Toileting Skilled Clinical Factors: Pt able to manage brief and hygiene during toileting  Functional Mobility: Minimal assistance  Functional Mobility Skilled Clinical Factors: Pt MOD A for sit to stand from recliner and toilet with use of grab bars as needed,  Pt MIN A for fxl mob.  Pt walked normal household distance with FWW with MIN loss of balance, pt able to self correct    OT Exercises  Exercise Treatment: Pt completed BUE therex to improve strength/endurace for ease of fxl tasks.  Pt completed red digiflex x 20, yellow flexbar x 20 bends, 1# weight x 20 reps x all planes shoulders, wrists, and elbows.  Pt required MIN restbreaks to recover     Safety Devices  Type of Devices: All fall risk precautions in place;Call light within reach;Chair alarm in place;Left in chair (PT in the room)     Patient Education  Education Given To: Patient  Education Provided: Role of 
PRN metoprolol given for HR in 120s to 130s on telemetry. See mar.   
Patient assisted to restroom- small bowel incontinence noted at this time- 500 cc void noted of yellow urine. Writer then assisted patient back to bed, tolerating ambulation well.  No additional requests at this time, call light in reach, bed in lowest position and alarm engage dto promote patient safety. Plan of care on going.    
Patient in bed, resting with eyes closed. Reassessment completed at this time. Vitals taken and documented. Patient encouraged to ask questions. Patient denies pain or complaints. Call light and bed side table within reach. Side rails up times two.     
Patient in bed, watching television. Patient educated on medication to be given tonight and physician's orders to be completed. Patient stated understanding. Patient encouraged to ask questions. Patient denies of any questions at this time.    Vitals taken and documented. See flow sheet for details. Assessment completed and documented. Patient alert, oriented x4. Calm, pleasant. Speech clear. Patient states she has numbness and tingling in hands and feet, chronic per patient. Lung sounds clear in upper bilateral lobes of lungs. Diminished at bilateral bases of lungs and right middle lobe of lung. Noted productive cough. Abdomen round, soft, non tender to palpation. Bowel sounds active in all four quadrants. Scattered ecchymosis noted. Patient denies of pain, chest pain, or shortness of breath. Patient denies needs or concerns at this time. Call light and over bed table in reach. Side rails up times two.     
Patient resting comfortably at this time. Patient denies any pain and denies any other needs at this time. Patient alert & oriented x4 and able to answer all questions appropriately and follow commands. Assessment and vitals as charted. Bed alarm on, bed locked, gripper socks on, call light within reach and able to use appropriately. Will continue to monitor this shift.    
Patient resting comfortably at this time. Patient denies any pain and denies any other needs at this time. Patient alert & oriented x4 and able to answer all questions appropriately and follow commands. Assessment and vitals as charted. Chair locked, gripper socks on, call light within reach and able to use appropriately. Will continue to monitor this shift.    
Patient resting in bed, assessment and vitals complete, see flow sheet for documentation, patient alert and orient x4, denies pain at this time, lung sounds clear diminished throughout, denies pain at this time all needs met, call light within reach.  
Patient vitals and assessment completed, see flowsheet. Patient A&Ox4, breathing normal with end expiratory wheezing throughout on auscultation. Pt denies pain or any further needs, chair alarm on, call light within reach, will continue to monitor.     
Physical Therapy  Facility/Department: [facility] MED SURG  Daily Treatment Note  NAME: Renetta Malcolm  : 1950  MRN: [id number]    Date of Service: 2024    Discharge Recommendations:  Continue to assess pending progress, Home with Home health PT        Patient Diagnosis(es): The primary encounter diagnosis was COVID-19 virus infection. Diagnoses of Anemia due to antineoplastic chemotherapy and Hypokalemia were also pertinent to this visit.    Assessment   Assessment: Pt in chair upon arrival, with aide present. Aide and pt report just getting done with a bath and pt willing to complete exercises only at this time. Pt completes seated therex BLE x15-20 in all available planes.  Activity Tolerance: Patient limited by endurance;Patient limited by fatigue     Plan    Physical Therapy Plan  General Plan: 2 times a day 7 days a week (1x/day on weekends)     Restrictions  Restrictions/Precautions  Restrictions/Precautions: General Precautions, Isolation, Fall Risk  Required Braces or Orthoses?: No     Subjective    Subjective  Subjective: Pt in chair upon arrival, with aide present. Aide and pt report just getting done with a bath and pt willing to complete exercises only at this time.  Pain: denies     Objective   Vitals     Bed Mobility Training  Bed Mobility Training: No  Transfer Training  Transfer Training: No  Gait Training  Gait Training: No     PT Exercises  Exercise Treatment: seated therex BLE x15-20 in all available planes     Safety Devices  Type of Devices: Left in chair;Chair alarm in place;Call light within reach;Patient at risk for falls;All fall risk precautions in place;Gait belt;Nurse notified       Goals  Short Term Goals  Time Frame for Short Term Goals: 20 days  Short Term Goal 1: Patient to complete all transfers with SUP and no LOB to decrease fall risk.  Short Term Goal 2: Patient to ambulate 50ftx2 with FWW or LRAD and SUP with no LOB or fatigue for improved mobility.  Short Term Goal 3: 
Physical Therapy  Facility/Department: [facility] MED SURG  Daily Treatment Note  NAME: Renetta Malcolm  : 1950  MRN: [id number]    Date of Service: 2024    Discharge Recommendations:  Continue to assess pending progress, Home with Home health PT     Patient Diagnosis(es): The primary encounter diagnosis was COVID-19 virus infection. Diagnoses of Anemia due to antineoplastic chemotherapy and Hypokalemia were also pertinent to this visit.    Assessment   Assessment: Transfers: Min A x1 with additional time. Pt AMB for 30 ft with RW and CGA/ Min A x1. Pt demos short step length and steppage gait. Education provided for proper technique with transfers and hand placement. Seated B LE exercises x20 reps.  Activity Tolerance: Patient limited by endurance;Patient limited by fatigue     Plan    Physical Therapy Plan  General Plan: 2 times a day 7 days a week  Specific Instructions for Next Treatment: Once daily on weekends  Current Treatment Recommendations: Strengthening;ROM;Balance training;Functional mobility training;Transfer training;Neuromuscular re-education;Stair training;Gait training;Home exercise program;Safety education & training;Therapeutic activities;Patient/Caregiver education & training;Manual;Endurance training     Restrictions  Restrictions/Precautions  Restrictions/Precautions: General Precautions, Isolation, Fall Risk  Required Braces or Orthoses?: No     Subjective    Subjective  Subjective: Pt in chair upon arrival with nurse present finishing up starting pt's IV. Pt is agreeable to therapy at this time.  Pain: denies  Orientation  Overall Orientation Status: Within Functional Limits     Objective   Bed Mobility Training  Bed Mobility Training: No  Transfer Training  Transfer Training: Yes  Overall Level of Assistance: Minimum assistance;Assist X1  Interventions: Safety awareness training;Verbal cues  Sit to Stand: Minimum assistance;Assist X1  Stand to Sit: Minimum assistance;Assist X1  Gait 
Physical Therapy  Facility/Department: [facility] MED SURG  Daily Treatment Note  NAME: Renetta Malcolm  : 1950  MRN: [id number]    Date of Service: 2024    Discharge Recommendations:  Continue to assess pending progress, Home with Home health PT        Patient Diagnosis(es): The primary encounter diagnosis was COVID-19 virus infection. Diagnoses of Anemia due to antineoplastic chemotherapy and Hypokalemia were also pertinent to this visit.    Assessment   Assessment: Transfers--Cierra, bed mobilty--Cierra. Gait with RW and Cierra/CGA d/t impaired balance--15ft x 2. Seated and reclined B LE therex in all available planes of motion.  Activity Tolerance: Patient limited by endurance;Patient limited by fatigue     Plan    Physical Therapy Plan  General Plan: 2 times a day 7 days a week (1x per day on weekends.)  Current Treatment Recommendations: Strengthening;ROM;Balance training;Functional mobility training;Transfer training;Neuromuscular re-education;Stair training;Gait training;Home exercise program;Safety education & training;Therapeutic activities;Patient/Caregiver education & training;Manual;Endurance training     Restrictions  Restrictions/Precautions  Restrictions/Precautions: General Precautions, Isolation, Fall Risk  Required Braces or Orthoses?: No     Subjective    Subjective  Subjective: Pt in chair upon arrival, agreeable to therapy at this time.  Pain: denies  Orientation  Overall Orientation Status: Within Functional Limits  Cognition  Overall Cognitive Status: WNL     Objective   Bed Mobility Training  Bed Mobility Training: Yes  Overall Level of Assistance: Assist X1;Minimum assistance  Interventions: Demonstration;Verbal cues  Sit to Supine: Assist X1;Minimum assistance  Scooting: Assist X1;Minimum assistance  Balance  Sitting: Impaired  Sitting - Static: Fair (occasional)  Sitting - Dynamic: Fair (occasional)  Standing: Impaired  Transfer Training  Transfer Training: Yes  Overall Level of 
Physical Therapy  Facility/Department: [facility] MED SURG  Daily Treatment Note  NAME: Renetta Malcolm  : 1950  MRN: [id number]    Date of Service: 2024    Discharge Recommendations:  Continue to assess pending progress, Home with Home health PT   PT Equipment Recommendations  Equipment Needed: No    Patient Diagnosis(es): The primary encounter diagnosis was COVID-19 virus infection. Diagnoses of Anemia due to antineoplastic chemotherapy and Hypokalemia were also pertinent to this visit.    Assessment   Assessment: Pt completed ambulation this date to and from the bathroom / chair and in the room 30 ft. Pt required strong contact guard to min A for balance and safe use of walker. Once in chair, pt declined exercises due to fatigue and wanting to eat lunch. Shortness of breath noted following ambulation. Pt left in chair, chair alarm on, call light in reach, family in room.  Activity Tolerance: Patient limited by endurance;Patient limited by fatigue  Equipment Needed: No     Plan    Physical Therapy Plan  General Plan: 2 times a day 7 days a week  Specific Instructions for Next Treatment: Once daily on weekends  Current Treatment Recommendations: Strengthening;ROM;Balance training;Functional mobility training;Transfer training;Neuromuscular re-education;Stair training;Gait training;Home exercise program;Safety education & training;Therapeutic activities;Patient/Caregiver education & training;Manual;Endurance training     Restrictions  Restrictions/Precautions  Restrictions/Precautions: General Precautions, Isolation, Fall Risk  Required Braces or Orthoses?: No     Subjective    Subjective  Subjective: Pt in chair upon arrival. Aide in room to help with walking to bathroom. Denies pain  Orientation  Overall Orientation Status: Within Functional Limits  Orientation Level: Oriented X4  Cognition  Overall Cognitive Status: WFL     Objective   Vitals     Bed Mobility Training  Bed Mobility Training: 
Physical Therapy  Facility/Department: [facility] MED SURG  Daily Treatment Note  NAME: Renetta Malcolm  : 1950  MRN: [id number]    Date of Service: 2024    Discharge Recommendations:  Continue to assess pending progress, Home with Home health PT        Patient Diagnosis(es): The primary encounter diagnosis was COVID-19 virus infection. Diagnoses of Anemia due to antineoplastic chemotherapy and Hypokalemia were also pertinent to this visit.    Assessment   Assessment: Transfers: CGA/MIN A x1. Pt required multiple attempts to stand d/t decreased strength. Patient ambulated 25ft with RW, CGA/MIN A, pt demo's steppage gait pattern, decreased B LE strength and muscular power. Seated exercises 15x in all available planes of motion with VCs on form and technique with fair carryover.  Activity Tolerance: Patient tolerated treatment well;Patient limited by endurance     Plan    Physical Therapy Plan  General Plan: 2 times a day 7 days a week     Restrictions  Restrictions/Precautions  Restrictions/Precautions: General Precautions, Isolation, Fall Risk  Required Braces or Orthoses?: No     Subjective    Subjective  Subjective: Pt in chair upon arrival, agreeable to therapy at this time.  Pain: denies  Orientation  Overall Orientation Status: Within Normal Limits  Cognition  Overall Cognitive Status: WNL     Objective      Bed Mobility Training  Bed Mobility Training: No  Transfer Training  Transfer Training: Yes  Overall Level of Assistance: Contact-guard assistance;Assist X1;Minimum assistance (multiple attempts to stand)  Interventions: Safety awareness training;Verbal cues  Sit to Stand: Contact-guard assistance;Minimum assistance;Assist X1  Stand to Sit: Contact-guard assistance;Assist X1 (Pt demo's poor quad eccentric control, VCs to use B UE to assist with descending)  Gait Training  Gait Training: Yes  Gait  Gait Training: Yes  Overall Level of Assistance: Contact-guard assistance;Minimum assistance;Assist 
Physical Therapy  Facility/Department: [facility] MED SURG  Daily Treatment Note  NAME: Renetta Malcolm  : 1950  MRN: [id number]    Date of Service: 2024    Discharge Recommendations:  Continue to assess pending progress, Home with Home health PT     Patient Diagnosis(es): The primary encounter diagnosis was COVID-19 virus infection. Diagnoses of Anemia due to antineoplastic chemotherapy and Hypokalemia were also pertinent to this visit.    Assessment   Assessment: Bed mobility: CGA/ Min A x1. Supine B LE exercises x20 reps in all available planes. Pt declines AMB d/t currently getting blood and feeling fatigued.  Activity Tolerance: Patient limited by fatigue;Patient limited by endurance     Plan    Physical Therapy Plan  General Plan: 2 times a day 7 days a week  Specific Instructions for Next Treatment: Once daily on weekends  Current Treatment Recommendations: Strengthening;ROM;Balance training;Functional mobility training;Transfer training;Neuromuscular re-education;Stair training;Gait training;Home exercise program;Safety education & training;Therapeutic activities;Patient/Caregiver education & training;Manual;Endurance training     Restrictions  Restrictions/Precautions  Restrictions/Precautions: General Precautions, Isolation, Fall Risk  Required Braces or Orthoses?: No     Subjective    Subjective  Subjective: Pt in bed upon arrival with nurse starting pt on blood. Pt is agreeable to therapy at this time.  Pain: denies  Orientation  Overall Orientation Status: Within Functional Limits     Objective   Bed Mobility Training  Bed Mobility Training: Yes  Overall Level of Assistance: Minimum assistance;Assist X1;Contact-guard assistance  Interventions: Demonstration;Verbal cues;Tactile cues  Rolling: Contact-guard assistance;Assist X1  Supine to Sit: Contact-guard assistance;Assist X1  Sit to Supine: Assist X1;Minimum assistance  Scooting: Minimum assistance;Assist X1  Transfer Training  Transfer 
Pt admitted from ER for COVID. Pt is A&Ox4, calm and cooperative. Assmt and VS as charted. Pt does not require supplemental oxygen at this time. Pt has hx of endometrial cancer and has implanted port in right chest. Port is accessed at this time. Pt oriented to room, bathroom, call light system and meal times. Sons accompanied pt to [facility]. Pt resting in bed, call light within reach, denies further needs at this time. Will continue to monitor.     
Pt had EKG pads/leads tangled and was trying to get the box out of her pocket and next to her and when she was messing with those, she inadvertently de-accessed her port; Sanna RN, oncoming nurse, notified. EKG pads/leads replaced. Call light within reach.  
Received call from lab regarding critical lab of hemoglobin 6.3.  
Reviewed discharge instructions with patient and family members.  Patient aware of need to  prescriptions.  Reviewed new medications with patient and side effects to monitor for.  Patient aware of [facility] health coming into home and aware of date/time of follow up appointments.  Patient aware of need for repeat labs for 2/27/24.  Instructed to increase fluid intakes.  Instructed to remain in isolation till 2/25/24.  Educational handouts on UTI and Covid given to patient.  Questions answered.  Verbalizes understanding.  Copy of discharge instructions given to patient.  
Spoke with Dr. German regarding Toprol XL extended release tablet 25 mg dose due at 0900 because 2.5 mg was given IV at 0715. Dr. German confirmed administering the Toprol XL at this time. ; -54. Care ongoing, Call light within reach.  
Spoke with Wilma in lab, canceled 0145 H&H blood draw. Will draw at 0230 per protocol since patient just finished receiving 1 unit of blood.  
[facility] home care aware of the discharge today.  FLAVIA Arroyo    
Writer at bedside for shift assessment and vitals- patient remains alert and oriented x4, calm and cooperative with assessment- see flow sheet for details. Patient currently denies pain and discomfort. Writer offered assistance to restroom at this time- 400 cc void and small bowel movement noted. Patient then returned to bed tolerating ambulation well. No additional requests at this time, call light placed within reach, bed in lowest position. Plan of care on going.   
Writer entered patient's room to patient being confused, at the end of the bed yelling \"wilner\" and stating she was \"pissed off\" she could not get her coban off of her wrist. Patient states she is unsure where she is at but she \"thinks she is at home or close to it.\" Patient does state she is in [place] and it is year 2024. Writer walked patient to the bathroom, redirected and put patient back to bed. Vitals as charted.  
Writer to bedside to complete morning assessment. Upon entry to room, pt awake and sitting in the chair, respirations even and unlabored while on room air. Vitals obtained and assessment completed, see flow sheet for details. Pt denies needs from writer at this time. Call light in reach. Care ongoing.    
Writer to bedside to complete morning assessment. Upon entry to room, pt awake and sitting on the side of the bed, respirations even and unlabored while on room air. Pt escorted to the restroom for a BM and urination. Vitals obtained and assessment completed, see flow sheet for details. Pt denies needs from writer at this time. Call light in reach. Care ongoing.    
Stair training, Gait training, Home exercise program, Safety education & training, Therapeutic activities, Patient/Caregiver education & training, Manual, Endurance training  Safety Devices  Type of Devices: All brody prominences offloaded, Patient at risk for falls, All fall risk precautions in place, Left in bed, Bed alarm in place, Call light within reach     Restrictions  Restrictions/Precautions  Restrictions/Precautions: General Precautions, Isolation, Fall Risk     Subjective   General  Chart Reviewed: Yes  Patient assessed for rehabilitation services?: Yes  Response To Previous Treatment: Not applicable  Family / Caregiver Present: No  Referring Practitioner: Dr. Maxi MD  Referral Date : 02/15/24  Diagnosis: Hypokalemia, E87.6  Follows Commands: Within Functional Limits  Subjective  Subjective: Pt agrees to PT eval. States she never has any pain.         Social/Functional History  Social/Functional History  Lives With: Son  Type of Home: House  Home Layout: One level  Home Access: Stairs to enter with rails  Entrance Stairs - Number of Steps: 4  Entrance Stairs - Rails: Both  Home Equipment: Walker, rolling, Cane  Has the patient had two or more falls in the past year or any fall with injury in the past year?: No  Receives Help From: Family  ADL Assistance: Independent  Homemaking Assistance: Needs assistance  Homemaking Responsibilities: Yes  Ambulation Assistance: Independent  Transfer Assistance: Independent  Additional Comments: Pt shares IADL's with her son; she uses a FWW lately for mobility or a cane d/t progressive weakness. She had to lower herself to the floor this morning but denies any other falls.  Vision/Hearing  Vision  Vision: Within Functional Limits  Hearing  Hearing: Within functional limits    Cognition   Orientation  Overall Orientation Status: Within Functional Limits  Cognition  Overall Cognitive Status: WFL     Objective   Pulse: (!) 119  Heart Rate Source: Monitor  BP: 117/67  BP 
Training: No  Gait Training  Gait Training: No  Wheelchair Management  Wheelchair Management: No  PT Exercises  Exercise Treatment: Supine exercises x10-15 reps in all available planes of motion  Safety Devices  Type of Devices: All brody prominences offloaded;Patient at risk for falls;All fall risk precautions in place;Left in bed;Bed alarm in place;Call light within reach;Nurse notified  Restraints  Restraints Initially in Place: No     Goals  Short Term Goals  Time Frame for Short Term Goals: 20 days  Short Term Goal 1: Patient to complete all transfers with SUP and no LOB to decrease fall risk.  Short Term Goal 2: Patient to ambulate 50ftx2 with FWW or LRAD and SUP with no LOB or fatigue for improved mobility.  Short Term Goal 3: Patient to ascend/descend 4 steps with B HR and CGAx1 with no LOB to safely enter her home.  Short Term Goal 4: Patient to tolerate 20-30 min of ther ex/act for improved functional strength.    Education  Patient Education  Education Given To: Patient  Education Provided: Role of Therapy  Education Method: Verbal  Barriers to Learning: None  Education Outcome: Verbalized understanding;Continued education needed    Therapy Time   Individual Concurrent Group Co-treatment   Time In 1428         Time Out 1445         Minutes 17         Timed Code Treatment Minutes: 15 Minutes    Sandra Rodriguez PTA           
Yes  Overall Level of Assistance: Assist X1;Contact-guard assistance  Interventions: Safety awareness training;Verbal cues;Demonstration  Sit to Stand: Assist X1;Contact-guard assistance  Stand to Sit: Assist X1;Contact-guard assistance  Stand Pivot Transfers: Assist X1;Contact-guard assistance  Gait Training  Gait Training: Yes  Gait  Gait Training: Yes  Overall Level of Assistance: Assist X1;Contact-guard assistance  Distance (ft): 40 Feet  Assistive Device: Walker, rolling;Gait belt  Interventions: Demonstration;Verbal cues;Visual cues (Cues for upright posture, AD proximity, increased step height and length.)  Base of Support: Narrowed  Speed/Reshma: Slow  Step Length: Left shortened;Right shortened  Gait Abnormalities: Steppage gait;Decreased step clearance     PT Exercises  Exercise Treatment: Seated B LE therex x 15 in all available planes of motion. Frequent short rest breaks d/t fatigue.     Safety Devices  Type of Devices: All fall risk precautions in place;Call light within reach;Chair alarm in place;Gait belt;Left in chair;Patient at risk for falls       Goals  Short Term Goals  Time Frame for Short Term Goals: 20 days  Short Term Goal 1: Patient to complete all transfers with SUP and no LOB to decrease fall risk.  Short Term Goal 2: Patient to ambulate 50ftx2 with FWW or LRAD and SUP with no LOB or fatigue for improved mobility.  Short Term Goal 3: Patient to ascend/descend 4 steps with B HR and CGAx1 with no LOB to safely enter her home.  Short Term Goal 4: Patient to tolerate 20-30 min of ther ex/act for improved functional strength.    Education  Patient Education  Education Given To: Patient  Education Provided: Role of Therapy;Energy Conservation;Transfer Training;Fall Prevention Strategies  Education Provided Comments: d/c folder provided this date, all questions answered.  Education Method: Verbal;Demonstration  Barriers to Learning: None  Education Outcome: Verbalized understanding;Demonstrated 
ther ex/act for improved functional strength.    Education  Patient Education  Education Given To: Patient  Education Provided: Plan of Care;Role of Therapy;Fall Prevention Strategies  Education Provided Comments: Educated on technique with steps.  Education Method: Verbal;Demonstration  Barriers to Learning: None  Education Outcome: Verbalized understanding;Demonstrated understanding    Therapy Time   Individual Concurrent Group Co-treatment   Time In 1356         Time Out 1421         Minutes 25                 Nanette Martines, DANI           
B Antigen.                 Imaging Data:   XR CHEST PORTABLE    Result Date: 2/15/2024  EXAMINATION: ONE XRAY VIEW OF THE CHEST 2/15/2024 6:57 am COMPARISON: 10/25/2023 HISTORY: ORDERING SYSTEM PROVIDED HISTORY: COVID infection / weakness TECHNOLOGIST PROVIDED HISTORY: COVID infection / weakness FINDINGS: Interim placement of right IJ approach Port-A-Cath, which appears in satisfactory position.  A loop recorder remains in place. Normal heart size and pulmonary vasculature.  The lungs are clear and normally expanded.  No pneumothorax or pleural effusion evident.  Surrounding structures are unremarkable.     No radiographic evidence of an acute cardiopulmonary process.         MEDICAL DECISION MAKING:  Primary Problem(s): Acute COVID-19  Condition is stable  Treatment plan:   Infectious Disease consult appreciated: Dr. 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 Stinson's notes reviewed  Monitor H/H  Transfuse if Hb < 7    Stage IIIa endometrial cancer status post hysterectomy   Condition is a chronic stable condition  Treatment plan:   Heme/Onc consult appreciated: Dr. Stinson's notes reviewed  Medications:   Chemotherapy on hold during tx for acute Covid infection    Nutrition status:   At risk for malnutrition  Dietician consult appreciated     Hospital Prophylaxis:   DVT:  Anticoagulation on hold due to anemia    Stress Ulcer: H2 Blocker     MDM Data:   Test interpretation:  My independent EKG interpretation: Sinus tachycardia with non-specific ST 
documented, or surrogate decision maker is listed in the patient's medical record  [If \"yes\", STOP HERE]     [] The patient's Advance Care Plan is NOT present because:    []  I confirmed today that the patient does not wish or was not able to name a   surrogate decision maker or provide and advance care plan.    [] Hospice care is currently being provided or has been provided within the   calendar year.    []  I did NOT confirm today the presence of an Advance Care Plan or surrogate   decision maker documented within the patient's medical record.   [DOES NOT SATISFY MIPS PERFORMANCE]    Blood Transfusion Consent    I have discussed with the Patient and/or Medical POA  the rationale for blood component transfusion; its benefits in treating or preventing fatigue, organ damage, or death; and its risk which includes mild transfusion reactions, rare risk of blood borne infection, or more serious but rare reactions. I have discussed the alternatives to transfusion, including the risk and consequences of not receiving transfusion. The paietn and/ or Medical POA  had an opportunity to ask questions and had agreed to proceed with transfusion of blood components.      Mayelin Tamayo, MOE-CNP      Mayelin Tamayo, MOE - CNP , MOE, NP-C

## 2024-02-20 NOTE — DISCHARGE SUMMARY
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 *      Hospital Course:   Renetta Malcolm is a 73 y.o. female admitted with acute COVID-19.  She presented with complaints of increasing fatigue and weakness.  Patient reported lowering herself to the floor at home as she felt weak upon trying to go to the restroom at home.  She denied syncope or presyncope.  She denied injuries.  She stated symptoms were progressing over several days.  Patient did have chemotherapy the week before and indicated over the past 3 to 4 days had had some loose bowel movements which was new for her.  She admitted to decreased appetite.  She complained of nonproductive cough.  She denied fever or chills.  She denied ill contacts.  Patient is chronically on Xarelto for TIA.  She she denied melena or hematochezia.  She denied hematuria.  She denied recent medication changes.  During patient's workup she was found to have COVID-19 and was admitted.  Patient was treated with IV fluids and Toprol-XL was increased to 50 mg due to tachycardia.  Patient did have persistent anemia and was transfused 1 unit of PRBCs and tolerated well.  No signs of bleeding were seen.  PT OT

## 2024-02-20 NOTE — DISCHARGE INSTR - DIET

## 2024-02-20 NOTE — VIRTUAL HEALTH
Renetta Malcolm, was evaluated through a synchronous (real-time) audio-video encounter. The patient (and/or guardian if applicable) is aware that this is a billable service, which includes applicable co-pays. This virtual visit was conducted with patient's (and/or legal guardian's) consent. Patient identification was verified, and a caregiver was present when appropriate.  The patient was located at Facility (Appt Department): [facility]MS MED SURG  [address] [facility] [address]  Loc: [phone]  The provider was located at Facility (Login Dept): [facility] INF DIS  [address] [address]  [phone]    Consults     Total time spent on this encounter: Not billed by time    --Jan Diane MD on 2/15/2024 at 12:01 PM    An electronic signature was used to authenticate this note.[facility] - Initial Consult Note COVID 19 Patient  Today's Date and Time: 2/15/2024, 12:01 PM    Impression :     COVID 19 Confirmed Infection  Covid tests:  Positive Covid Test Date: 2/15/24  Vaccination Status: Patient received Covid vaccines on 3/3/21 and 3/31/21  Hypokalemia  Hyponatremia  Chronic anemia  Stage IIIA endometrial cancer s/p radical hysterectomy on chemo  Prior TIA on Xarelto    Recommendations:   Antibiotic treatment:  Monitor off antibiotics  Covid Rx:    Remdesivir: Continue Remdesivir  Decadron: Not indicated at this time  Actemra: Not indicated at this time  Paxlovid: Patient already receiving Remdesivir  Monitor CRP      Medical Decision Making/Summary/Discussion:2/15/2024     Patient admitted with COVID 19 infection    Infection Control Recommendations   [place] Precautions  Airborne isolation  Droplet Plus Isolation. Stop date 2-25-24    Antimicrobial Stewardship Recommendations     Discontinuation of therapy    Coordination of Outpatient Care:   Estimated Length of IV antimicrobials:TBD  Patient will need Midline Catheter 
Renetta Malcolm, was evaluated through a synchronous (real-time) audio-video encounter. The patient (and/or guardian if applicable) is aware that this is a billable service, which includes applicable co-pays. This virtual visit was conducted with patient's (and/or legal guardian's) consent. Patient identification was verified, and a caregiver was present when appropriate.  The patient was located at Facility (Appt Department): [facility] MED SURG  [address] [facility] [address] [facility] [address]  Loc: [phone]  The provider was located at Facility (Login Dept): [facility] INF DIS  [address] [address]  [phone]    Consults     Total time spent on this encounter: Not billed by time    --Jan Diane MD on 2/17/2024 at 10:29 AM    An electronic signature was used to authenticate this note.[facility] - Progress Note telemedicine   COVID 19 Patient  Today's Date and Time: 2/17/2024, 10:29 AM    Impression :     COVID 19 Confirmed Infection  Covid tests:  Positive Covid Test Date: 2/15/24  Vaccination Status: Patient received Covid vaccines on 3/3/21 and 3/31/21  Hypokalemia  Hyponatremia  Chronic anemia  Stage IIIA endometrial cancer s/p radical hysterectomy on chemo  Prior TIA on Xarelto    Recommendations:   Antibiotic treatment:  Monitor off antibiotics  Covid Rx:    Remdesivir: Continue Remdesivir. Stop date 2-19-24  Decadron: Not indicated at this time  Actemra: Not indicated at this time  Paxlovid: Patient already receiving Remdesivir  Monitor CRP: 230    CRP very high but no respiratory issues. Will continue to monitor off Decadron.  Medical Decision Making/Summary/Discussion:2/17/2024     Patient admitted with COVID 19 infection    Infection Control Recommendations   [place] Precautions  Airborne isolation  Droplet Plus Isolation. Stop date 2-25-24    Antimicrobial Stewardship Recommendations     Discontinuation of 
Renetta Malcolm, was evaluated through a synchronous (real-time) audio-video encounter. The patient (and/or guardian if applicable) is aware that this is a billable service, which includes applicable co-pays. This virtual visit was conducted with patient's (and/or legal guardian's) consent. Patient identification was verified, and a caregiver was present when appropriate.  The patient was located at Facility (Appt Department): [facility] MED SURG  [address] [facility] [address]  Loc: [phone]  The provider was located at Facility (Login Dept): [facility] INF DIS  [address] [facility] [address]  [phone]    Consults     Total time spent on this encounter: Not billed by time    --Jan Diane MD on 2/19/2024 at 6:48 AM    An electronic signature was used to authenticate this note.[facility] - Progress Note telemedicine   COVID 19 Patient  Today's Date and Time: 2/19/2024, 6:48 AM    Impression :     COVID 19 Confirmed Infection  Covid tests:  Positive Covid Test Date: 2/15/24  Vaccination Status: Patient received Covid vaccines on 3/3/21 and 3/31/21  Hypokalemia  Hyponatremia  Chronic anemia  Stage IIIA endometrial cancer s/p radical hysterectomy on chemo  Prior TIA on Xarelto    Recommendations:   Antibiotic treatment:  Monitor off antibiotics  Covid Rx:    Remdesivir: Continue Remdesivir. Stop date 2-19-24  Decadron: Not indicated at this time  Actemra: Not indicated at this time  Paxlovid: Patient already receiving Remdesivir  Monitor CRP: 230    CRP very high but no respiratory issues. Will continue to monitor off Decadron.  Medical Decision Making/Summary/Discussion:2/19/2024     Patient admitted with COVID 19 infection    Infection Control Recommendations   [place] Precautions  Airborne isolation  Droplet Plus Isolation. Stop date 2-25-24    Antimicrobial Stewardship Recommendations     Discontinuation of 
Outpatient Care:   Estimated Length of IV antimicrobials:TBD  Patient will need Midline Catheter Insertion: TBD  Patient will need PICC line Insertion: No  Patient will need: Home IV , Infusion Center,  SNF,  LTAC:TBD  Patient will need outpatient wound care:No    Chief complaint/reason for consultation:   Concern for COVID infection      History of Present Illness:   Renetta Malcolm is a 73 y.o.-year-old  female who was initially admitted on 2/15/2024. Patient seen at the request of Dr. German    INITIAL HISTORY:    Patient, with a history of TIA on Xarelto, stage IIIA endometrial cancer s/p radical hysterectomy and on carboplatin/paclitaxel/dostarlimab (last dose 2/6), presented through ER with complaints of generalized weakness with diarrhea x 3 days as well as decreased appetite. She denied fever, chills, N/V. She said the weakness began the day before and she had to lower herself to the floor. Her son was able to help get her off the floor and took her to the hospital.    In the ED, the patient did not appear in any acute distress, but did appear dehydrated.     Labwork was mostly unremarkable other than hypokalemia, WBC of 1.7 and Hgb 7.6. The patient does have chronic anemia and leukopenia from cancer and chemo therapy.   She also had positive Covid test.   She denied any recent sick contacts.     Her vital signs were stable on room air.   She did have some tachycardia.     CXR showed no acute cardiopulmonary process.     She was initiated on Remdesivir.     ID service asked to evaluate and advice on treatment.      CXR 2/15/24  No radiographic evidence of an acute cardiopulmonary process.      CURRENT EVALUATION- INTERVAL CHANGES : 2/16/2024  /69   Pulse (!) 126   Temp 96.8 °F (36 °C) (Temporal)   Resp 18   Ht 1.676 m (5' 6\")   Wt 73.9 kg (163 lb)   SpO2 98%   Breastfeeding No   BMI 26.31 kg/m²     Afebrile  VS stable, Tachycardia    Patient on room air  No complaints  No new issues 
98.3 °F (36.8 °C) Temporal 96 18 96 % --   02/20/24 0547 -- -- -- -- -- 97 % --   02/20/24 0504 -- -- -- -- -- -- 76.3 kg (168 lb 3.2 oz)       It is not possible to conduct a full examination by Telehealth.  Information below is composite of information provided by the patient, RN and physicians on site.    General Appearance: Awake, alert, feeling weak  Head:  Normocephalic, no trauma  Eyes: Pupils equal, round, reactive to light; sclera anicteric; conjunctivae pink. No embolic phenomena.  ENT: Oropharynx clear, without erythema, exudate, or thrush. No tenderness of sinuses. Mouth/throat: mucosa pink and moist. No lesions. Dentition in good repair.  Neck:Supple, without lymphadenopathy. Thyroid normal, No bruits.  Pulmonary/Chest: Clear to auscultation, without wheezes, rales, or rhonchi.  No dullness to percussion.   Cardiovascular: Regular rate and rhythm without murmurs, rubs, or gallops. Tachycardia  Abdomen: Soft, non tender. Bowel sounds normal. No organomegaly  All four Extremities: No cyanosis, clubbing, edema, or effusions.  Neurologic: No gross sensory or motor deficits.  Skin: Warm and dry with good turgor.No signs of peripheral arterial or venous insufficiency. No ulcerations. No open wounds.    Medical Decision Making -Laboratory:   I have independently reviewed/ordered the following labs:    CBC with Differential:   Recent Labs     02/19/24  0540 02/19/24  1948 02/20/24  0505   WBC 4.4  --  5.3   HGB 7.7* 9.4* 9.2*   HCT 24.8* 30.5* 29.4*     --  156   LYMPHOPCT 27  --  8*   MONOPCT 5  --  16*       BMP:   Recent Labs     02/19/24  0540 02/19/24  1300 02/20/24  0505     --  137   K 3.5*  --  4.2     --  101   CO2 28  --  28   BUN 10  --  10   CREATININE 0.5  --  0.5   MG 1.5* 2.0  --        Hepatic Function Panel:   Recent Labs     02/19/24  0540 02/20/24  0505   PROT 5.3* 5.6*   LABALBU 2.5* 2.6*   BILITOT 0.3 0.4   ALKPHOS 93 95   ALT 22 22   AST 20 18       No results for 
COVID-19 in patients   with signs and symptoms of infection who are suspected of COVID-19.  An individual without symptoms of COVID-19 and who is not shedding SARS-CoV-2 virus would   expect to have a negative (not detected) result in this assay.  Fact sheet for Healthcare Providers: https://www.fda.gov/media/745968/download  Fact sheet for Patients: https://www.fda.gov/media/815586/download        Methodology: Isothermal Nucleic Acid Amplification        Results reported to the appropriate Health Department         Rapid influenza A/B antigens [[id number]] Collected: 02/15/24 0555    Order Status: Completed Specimen: Nasopharyngeal Updated: 02/15/24 0621     Flu A Antigen NEGATIVE     Comment: for Influenza A Antigen        Flu B Antigen NEGATIVE     Comment: for Influenza B Antigen.                 Medical Decision Making-Other:     Note:      Jan Diane MD  Office: [phone]    Daily Elements of Decision Making provided by Consulting Physician:  Note: I have independently performed the steps listed below as part of the medical decision making and evaluation.    Review of current Problems:  COVID 19 Confirmed Infection  Covid tests:  Positive Covid Test Date: 2/15/24  Vaccination Status: Patient received Covid vaccines on 3/3/21 and 3/31/21  Hypokalemia  Chronic anemia  Stage IIIA endometrial cancer s/p radical hysterectomy on chemo  History of TIA on Xarelto  Diarrhea   Generalized weakness  Evaluation of Patient:  Evaluated by Telemedicine for Covid presenting with diarrhea and weakness  Please see daily details in Interval Changes Section  Changes in physical exam:  Exam normal except for generalized weakness  Please see daily details in Physical Exam section  Changes in ROS:  Unchanged  Please see daily details in Review of Symptoms section  Discussion with Referring Physician or Service  Dr. German  Laboratory and Radiologic Studies with personal review of radiologic

## 2024-02-21 ENCOUNTER — SOCIAL WORK (OUTPATIENT)
Dept: SOCIAL WORK | Age: 74
End: 2024-02-21

## 2024-02-21 ENCOUNTER — HOSPITAL ENCOUNTER (EMERGENCY)
Age: 74
Discharge: NURSING, REHAB OR LONG-TERM CARE | End: 2024-02-21
Attending: EMERGENCY MEDICINE
Payer: MEDICARE

## 2024-02-21 ENCOUNTER — TELEPHONE (OUTPATIENT)
Dept: ONCOLOGY | Age: 74
End: 2024-02-21

## 2024-02-21 VITALS
DIASTOLIC BLOOD PRESSURE: 68 MMHG | WEIGHT: 168.21 LBS | SYSTOLIC BLOOD PRESSURE: 129 MMHG | OXYGEN SATURATION: 97 % | RESPIRATION RATE: 22 BRPM | HEART RATE: 88 BPM | TEMPERATURE: 97.9 F | BODY MASS INDEX: 27.15 KG/M2

## 2024-02-21 DIAGNOSIS — U07.1 COVID-19: ICD-10-CM

## 2024-02-21 DIAGNOSIS — R53.1 GENERAL WEAKNESS: Primary | ICD-10-CM

## 2024-02-21 LAB
ANION GAP SERPL CALCULATED.3IONS-SCNC: 10 MMOL/L (ref 9–17)
ATYPICAL LYMPHOCYTE ABSOLUTE COUNT: 0.05 K/UL
ATYPICAL LYMPHOCYTES: 1 %
BASOPHILS # BLD: 0 K/UL (ref 0–0.2)
BASOPHILS NFR BLD: 0 % (ref 0–2)
BUN SERPL-MCNC: 9 MG/DL (ref 8–23)
BUN/CREAT SERPL: 15 (ref 9–20)
CALCIUM SERPL-MCNC: 8.5 MG/DL (ref 8.6–10.4)
CHLORIDE SERPL-SCNC: 99 MMOL/L (ref 98–107)
CO2 SERPL-SCNC: 26 MMOL/L (ref 20–31)
CREAT SERPL-MCNC: 0.6 MG/DL (ref 0.5–0.9)
EOSINOPHIL # BLD: 0 K/UL (ref 0–0.44)
EOSINOPHILS RELATIVE PERCENT: 0 % (ref 1–4)
ERYTHROCYTE [DISTWIDTH] IN BLOOD BY AUTOMATED COUNT: 20.3 % (ref 11.8–14.4)
GFR SERPL CREATININE-BSD FRML MDRD: >60 ML/MIN/1.73M2
GLUCOSE SERPL-MCNC: 140 MG/DL (ref 70–99)
HCT VFR BLD AUTO: 31.7 % (ref 36.3–47.1)
HGB BLD-MCNC: 9.9 G/DL (ref 11.9–15.1)
IMM GRANULOCYTES # BLD AUTO: 0 K/UL (ref 0–0.3)
IMM GRANULOCYTES NFR BLD: 0 %
LYMPHOCYTES NFR BLD: 1.1 K/UL (ref 1.1–3.7)
LYMPHOCYTES RELATIVE PERCENT: 22 % (ref 24–43)
MCH RBC QN AUTO: 27.1 PG (ref 25.2–33.5)
MCHC RBC AUTO-ENTMCNC: 31.2 G/DL (ref 28.4–34.8)
MCV RBC AUTO: 86.8 FL (ref 82.6–102.9)
MONOCYTES NFR BLD: 0.4 K/UL (ref 0.1–1.2)
MONOCYTES NFR BLD: 8 % (ref 3–12)
MORPHOLOGY: ABNORMAL
NEUTROPHILS NFR BLD: 69 % (ref 36–65)
NEUTS SEG NFR BLD: 3.45 K/UL (ref 1.5–8.1)
NRBC BLD-RTO: 0 PER 100 WBC
PLATELET # BLD AUTO: 193 K/UL (ref 138–453)
PMV BLD AUTO: 9.5 FL (ref 8.1–13.5)
POTASSIUM SERPL-SCNC: 4.2 MMOL/L (ref 3.7–5.3)
RBC # BLD AUTO: 3.65 M/UL (ref 3.95–5.11)
SODIUM SERPL-SCNC: 135 MMOL/L (ref 135–144)
WBC OTHER # BLD: 5 K/UL (ref 3.5–11.3)

## 2024-02-21 PROCEDURE — 80048 BASIC METABOLIC PNL TOTAL CA: CPT

## 2024-02-21 PROCEDURE — 99283 EMERGENCY DEPT VISIT LOW MDM: CPT

## 2024-02-21 PROCEDURE — 85025 COMPLETE CBC W/AUTO DIFF WBC: CPT

## 2024-02-21 ASSESSMENT — LIFESTYLE VARIABLES
HOW OFTEN DO YOU HAVE A DRINK CONTAINING ALCOHOL: NEVER
HOW MANY STANDARD DRINKS CONTAINING ALCOHOL DO YOU HAVE ON A TYPICAL DAY: PATIENT DOES NOT DRINK

## 2024-02-21 ASSESSMENT — ENCOUNTER SYMPTOMS
SHORTNESS OF BREATH: 0
ABDOMINAL DISTENTION: 0
SORE THROAT: 0
BACK PAIN: 0

## 2024-02-21 ASSESSMENT — PAIN - FUNCTIONAL ASSESSMENT: PAIN_FUNCTIONAL_ASSESSMENT: NONE - DENIES PAIN

## 2024-02-21 NOTE — TELEPHONE ENCOUNTER
Name: Renetta Malcolm  : 1950  MRN: [id number]    Oncology Navigation Follow-Up Note    Contact Type:   Chart review    Notes: Chart reviewed after appointment canceled. Per MA daughter in law called office and canceled appointment this afternoon as pt had fallen this morning.  Pt was recently discharged from hospital with Covid.  Pt has been rescheduled for next 24 for follow up.  Note from SW noting referral to [facility] for SNF. Admittance after insurance approval.     Electronically signed by Estella Zhao RN on 2024 at 3:34 PM

## 2024-02-21 NOTE — PROGRESS NOTES
Received a phone call from the daughter in-law , stating the patient fall this morning.  They would like her to go to SNF for short term rehab.  They chose [facility].  Referral made and paper work fax to SNF.  She will need insurance approval before she can go to SNF.  FLAVIA Arroyo

## 2024-02-21 NOTE — ED PROVIDER NOTES
[facility] ED  EMERGENCY DEPARTMENT ENCOUNTER      Pt Name: Renetta Malcolm  MRN: [id number]  Birthdate 1950  Date of evaluation: 2/21/2024  Provider: Yuliet Angeles DO    CHIEF COMPLAINT       Chief Complaint   Patient presents with    Fatigue     Pt reports fatigue & generalized weakness intermittently. Pt. Reports covid +2/15 & d/c from hospital yesterday 2/20         HISTORY OF PRESENT ILLNESS   (Location/Symptom, Timing/Onset, Context/Setting, Quality, Duration, Modifying Factors, Severity)  Note limiting factors.   Renetta Malcolm is a 73 y.o. female who presents to the emergency department for generalized weakness and fatigue.  Patient was just discharged from the hospital yesterday after being admitted for 6 days for COVID-19.  Patient also apparently received 1 unit of blood while she was in the hospital but no other signs of bleeding.  Patient tells me that she was able to work with physical therapy while in the hospital yesterday and still felt weak but was ready to go home.  But this morning she was very unsteady on her feet even with the use of a cane and almost fell but lowered herself to the ground.  She denies any injuries.  She denies any nausea, vomiting or diarrhea.  Patient has endometrial cancer and is currently on chemotherapy but she has not had 1 since beginning of February.  Patient states that she feels too weak and did not think that she was quite ready yet to go back home.  Her daughter states that they tried to call  but did require her to come in for 2 days before they can send her to rehab.  Patient has been eating and drinking okay otherwise.    REVIEW OF SYSTEMS    (2-9 systems for level 4, 10 or more for level 5)     Review of Systems   Constitutional:  Positive for fatigue. Negative for fever.   HENT:  Negative for congestion and sore throat.    Eyes:  Negative for visual disturbance.   Respiratory:  Negative for shortness of breath.

## 2024-02-26 ENCOUNTER — HOSPITAL ENCOUNTER (OUTPATIENT)
Age: 74
Setting detail: SPECIMEN
Discharge: HOME | End: 2024-02-26

## 2024-02-26 LAB
ALBUMIN SERPL-MCNC: 3.2 G/DL (ref 3.5–5.2)
ALBUMIN/GLOB SERPL: 0.8 {RATIO} (ref 1–2.5)
ALP SERPL-CCNC: 115 U/L (ref 35–104)
ALT SERPL-CCNC: 21 U/L (ref 5–33)
ANION GAP SERPL CALCULATED.3IONS-SCNC: 13 MMOL/L (ref 9–17)
AST SERPL-CCNC: 19 U/L
BASOPHILS # BLD: 0.06 K/UL (ref 0–0.2)
BASOPHILS NFR BLD: 1 % (ref 0–2)
BILIRUB SERPL-MCNC: 0.4 MG/DL (ref 0.3–1.2)
BUN SERPL-MCNC: 11 MG/DL (ref 8–23)
BUN/CREAT SERPL: 16 (ref 9–20)
CALCIUM SERPL-MCNC: 8.9 MG/DL (ref 8.6–10.4)
CHLORIDE SERPL-SCNC: 100 MMOL/L (ref 98–107)
CO2 SERPL-SCNC: 24 MMOL/L (ref 20–31)
CREAT SERPL-MCNC: 0.7 MG/DL (ref 0.5–0.9)
EOSINOPHIL # BLD: 0 K/UL (ref 0–0.44)
EOSINOPHILS RELATIVE PERCENT: 0 % (ref 1–4)
ERYTHROCYTE [DISTWIDTH] IN BLOOD BY AUTOMATED COUNT: 21.8 % (ref 11.8–14.4)
GFR SERPL CREATININE-BSD FRML MDRD: >60 ML/MIN/1.73M2
GLUCOSE SERPL-MCNC: 122 MG/DL (ref 70–99)
HCT VFR BLD AUTO: 35.9 % (ref 36.3–47.1)
HGB BLD-MCNC: 11.1 G/DL (ref 11.9–15.1)
IMM GRANULOCYTES # BLD AUTO: 0 K/UL (ref 0–0.3)
IMM GRANULOCYTES NFR BLD: 0 %
LYMPHOCYTES NFR BLD: 1.51 K/UL (ref 1.1–3.7)
LYMPHOCYTES RELATIVE PERCENT: 26 % (ref 24–43)
MCH RBC QN AUTO: 27.8 PG (ref 25.2–33.5)
MCHC RBC AUTO-ENTMCNC: 30.9 G/DL (ref 28.4–34.8)
MCV RBC AUTO: 89.8 FL (ref 82.6–102.9)
MONOCYTES NFR BLD: 0.7 K/UL (ref 0.1–1.2)
MONOCYTES NFR BLD: 12 % (ref 3–12)
MORPHOLOGY: ABNORMAL
NEUTROPHILS NFR BLD: 61 % (ref 36–65)
NEUTS SEG NFR BLD: 3.53 K/UL (ref 1.5–8.1)
NRBC BLD-RTO: 0 PER 100 WBC
PLATELET # BLD AUTO: 261 K/UL (ref 138–453)
PMV BLD AUTO: 10.4 FL (ref 8.1–13.5)
POTASSIUM SERPL-SCNC: 4.2 MMOL/L (ref 3.7–5.3)
PROT SERPL-MCNC: 7.2 G/DL (ref 6.4–8.3)
RBC # BLD AUTO: 4 M/UL (ref 3.95–5.11)
SODIUM SERPL-SCNC: 137 MMOL/L (ref 135–144)
WBC OTHER # BLD: 5.8 K/UL (ref 3.5–11.3)

## 2024-02-26 PROCEDURE — 80053 COMPREHEN METABOLIC PANEL: CPT

## 2024-02-26 PROCEDURE — 36415 COLL VENOUS BLD VENIPUNCTURE: CPT

## 2024-02-26 PROCEDURE — P9603 ONE-WAY ALLOW PRORATED MILES: HCPCS

## 2024-02-26 PROCEDURE — 85025 COMPLETE CBC W/AUTO DIFF WBC: CPT

## 2024-02-28 ENCOUNTER — OFFICE VISIT (OUTPATIENT)
Dept: ONCOLOGY | Age: 74
End: 2024-02-28
Payer: MEDICARE

## 2024-02-28 VITALS
SYSTOLIC BLOOD PRESSURE: 117 MMHG | WEIGHT: 164 LBS | RESPIRATION RATE: 18 BRPM | BODY MASS INDEX: 26.47 KG/M2 | HEART RATE: 147 BPM | TEMPERATURE: 96.5 F | DIASTOLIC BLOOD PRESSURE: 53 MMHG

## 2024-02-28 DIAGNOSIS — C54.9 SEROUS CARCINOMA OF BODY OF UTERUS (HCC): Primary | ICD-10-CM

## 2024-02-28 DIAGNOSIS — C54.1 MALIGNANT NEOPLASM OF ENDOMETRIUM (HCC): ICD-10-CM

## 2024-02-28 PROCEDURE — 99214 OFFICE O/P EST MOD 30 MIN: CPT | Performed by: INTERNAL MEDICINE

## 2024-02-28 PROCEDURE — 1123F ACP DISCUSS/DSCN MKR DOCD: CPT | Performed by: INTERNAL MEDICINE

## 2024-02-28 RX ORDER — BISACODYL 10 MG
10 SUPPOSITORY, RECTAL RECTAL DAILY
COMMUNITY

## 2024-02-28 RX ORDER — MAGNESIUM HYDROXIDE/ALUMINUM HYDROXICE/SIMETHICONE 120; 1200; 1200 MG/30ML; MG/30ML; MG/30ML
5 SUSPENSION ORAL EVERY 6 HOURS PRN
COMMUNITY

## 2024-03-04 ENCOUNTER — HOSPITAL ENCOUNTER (OUTPATIENT)
Age: 74
Setting detail: SPECIMEN
Discharge: HOME | End: 2024-03-04

## 2024-03-04 ENCOUNTER — HOSPITAL ENCOUNTER (OUTPATIENT)
Age: 74
Discharge: HOME | End: 2024-03-06
Payer: MEDICARE

## 2024-03-04 DIAGNOSIS — I49.9 CARDIAC ARRHYTHMIA, UNSPECIFIED CARDIAC ARRHYTHMIA TYPE: ICD-10-CM

## 2024-03-04 DIAGNOSIS — R00.0 TACHYCARDIA: ICD-10-CM

## 2024-03-04 LAB
ALBUMIN SERPL-MCNC: 3.1 G/DL (ref 3.5–5.2)
ALBUMIN/GLOB SERPL: 0.9 {RATIO} (ref 1–2.5)
ALP SERPL-CCNC: 115 U/L (ref 35–104)
ALT SERPL-CCNC: 23 U/L (ref 5–33)
ANION GAP SERPL CALCULATED.3IONS-SCNC: 10 MMOL/L (ref 9–17)
AST SERPL-CCNC: 22 U/L
BASOPHILS # BLD: 0.05 K/UL (ref 0–0.2)
BASOPHILS NFR BLD: 1 % (ref 0–2)
BILIRUB SERPL-MCNC: 0.3 MG/DL (ref 0.3–1.2)
BUN SERPL-MCNC: 13 MG/DL (ref 8–23)
BUN/CREAT SERPL: 22 (ref 9–20)
CALCIUM SERPL-MCNC: 9.3 MG/DL (ref 8.6–10.4)
CHLORIDE SERPL-SCNC: 101 MMOL/L (ref 98–107)
CO2 SERPL-SCNC: 27 MMOL/L (ref 20–31)
CREAT SERPL-MCNC: 0.6 MG/DL (ref 0.5–0.9)
EOSINOPHIL # BLD: 0.1 K/UL (ref 0–0.44)
EOSINOPHILS RELATIVE PERCENT: 2 % (ref 1–4)
ERYTHROCYTE [DISTWIDTH] IN BLOOD BY AUTOMATED COUNT: 23.1 % (ref 11.8–14.4)
GFR SERPL CREATININE-BSD FRML MDRD: >60 ML/MIN/1.73M2
GLUCOSE SERPL-MCNC: 125 MG/DL (ref 70–99)
HCT VFR BLD AUTO: 32.1 % (ref 36.3–47.1)
HGB BLD-MCNC: 9.9 G/DL (ref 11.9–15.1)
IMM GRANULOCYTES # BLD AUTO: 0 K/UL (ref 0–0.3)
IMM GRANULOCYTES NFR BLD: 0 %
LYMPHOCYTES NFR BLD: 1.22 K/UL (ref 1.1–3.7)
LYMPHOCYTES RELATIVE PERCENT: 24 % (ref 24–43)
MCH RBC QN AUTO: 28.7 PG (ref 25.2–33.5)
MCHC RBC AUTO-ENTMCNC: 30.8 G/DL (ref 28.4–34.8)
MCV RBC AUTO: 93 FL (ref 82.6–102.9)
MONOCYTES NFR BLD: 0.77 K/UL (ref 0.1–1.2)
MONOCYTES NFR BLD: 15 % (ref 3–12)
MORPHOLOGY: ABNORMAL
NEUTROPHILS NFR BLD: 58 % (ref 36–65)
NEUTS SEG NFR BLD: 2.96 K/UL (ref 1.5–8.1)
NRBC BLD-RTO: 0 PER 100 WBC
PLATELET # BLD AUTO: 278 K/UL (ref 138–453)
PMV BLD AUTO: 9.6 FL (ref 8.1–13.5)
POTASSIUM SERPL-SCNC: 4.4 MMOL/L (ref 3.7–5.3)
PROT SERPL-MCNC: 6.5 G/DL (ref 6.4–8.3)
RBC # BLD AUTO: 3.45 M/UL (ref 3.95–5.11)
SODIUM SERPL-SCNC: 138 MMOL/L (ref 135–144)
WBC OTHER # BLD: 5.1 K/UL (ref 3.5–11.3)

## 2024-03-04 PROCEDURE — 36415 COLL VENOUS BLD VENIPUNCTURE: CPT

## 2024-03-04 PROCEDURE — P9603 ONE-WAY ALLOW PRORATED MILES: HCPCS

## 2024-03-04 PROCEDURE — 80053 COMPREHEN METABOLIC PANEL: CPT

## 2024-03-04 PROCEDURE — 85025 COMPLETE CBC W/AUTO DIFF WBC: CPT

## 2024-03-04 PROCEDURE — 93243 EXT ECG>48HR<7D SCAN A/R: CPT

## 2024-03-11 ENCOUNTER — HOSPITAL ENCOUNTER (OUTPATIENT)
Age: 74
Setting detail: SPECIMEN
Discharge: HOME | End: 2024-03-11

## 2024-03-11 LAB
ALBUMIN SERPL-MCNC: 3.3 G/DL (ref 3.5–5.2)
ALBUMIN/GLOB SERPL: 1 {RATIO} (ref 1–2.5)
ALP SERPL-CCNC: 101 U/L (ref 35–104)
ALT SERPL-CCNC: 21 U/L (ref 5–33)
ANION GAP SERPL CALCULATED.3IONS-SCNC: 11 MMOL/L (ref 9–17)
AST SERPL-CCNC: 18 U/L
BASOPHILS # BLD: 0.06 K/UL (ref 0–0.2)
BASOPHILS NFR BLD: 1 % (ref 0–2)
BILIRUB SERPL-MCNC: 0.3 MG/DL (ref 0.3–1.2)
BUN SERPL-MCNC: 13 MG/DL (ref 8–23)
BUN/CREAT SERPL: 22 (ref 9–20)
CALCIUM SERPL-MCNC: 9.1 MG/DL (ref 8.6–10.4)
CHLORIDE SERPL-SCNC: 101 MMOL/L (ref 98–107)
CO2 SERPL-SCNC: 27 MMOL/L (ref 20–31)
CREAT SERPL-MCNC: 0.6 MG/DL (ref 0.5–0.9)
EOSINOPHIL # BLD: 0.17 K/UL (ref 0–0.44)
EOSINOPHILS RELATIVE PERCENT: 3 % (ref 1–4)
ERYTHROCYTE [DISTWIDTH] IN BLOOD BY AUTOMATED COUNT: 24.6 % (ref 11.8–14.4)
GFR SERPL CREATININE-BSD FRML MDRD: >60 ML/MIN/1.73M2
GLUCOSE SERPL-MCNC: 127 MG/DL (ref 70–99)
HCT VFR BLD AUTO: 31.2 % (ref 36.3–47.1)
HGB BLD-MCNC: 9.7 G/DL (ref 11.9–15.1)
IMM GRANULOCYTES # BLD AUTO: 0 K/UL (ref 0–0.3)
IMM GRANULOCYTES NFR BLD: 0 %
LYMPHOCYTES NFR BLD: 1.32 K/UL (ref 1.1–3.7)
LYMPHOCYTES RELATIVE PERCENT: 24 % (ref 24–43)
MCH RBC QN AUTO: 29.2 PG (ref 25.2–33.5)
MCHC RBC AUTO-ENTMCNC: 31.1 G/DL (ref 28.4–34.8)
MCV RBC AUTO: 94 FL (ref 82.6–102.9)
MONOCYTES NFR BLD: 0.88 K/UL (ref 0.1–1.2)
MONOCYTES NFR BLD: 16 % (ref 3–12)
MORPHOLOGY: ABNORMAL
NEUTROPHILS NFR BLD: 56 % (ref 36–65)
NEUTS SEG NFR BLD: 3.07 K/UL (ref 1.5–8.1)
NRBC BLD-RTO: 0 PER 100 WBC
PLATELET # BLD AUTO: 235 K/UL (ref 138–453)
PMV BLD AUTO: 10 FL (ref 8.1–13.5)
POTASSIUM SERPL-SCNC: 4.3 MMOL/L (ref 3.7–5.3)
PROT SERPL-MCNC: 6.7 G/DL (ref 6.4–8.3)
RBC # BLD AUTO: 3.32 M/UL (ref 3.95–5.11)
SODIUM SERPL-SCNC: 139 MMOL/L (ref 135–144)
WBC OTHER # BLD: 5.5 K/UL (ref 3.5–11.3)

## 2024-03-11 PROCEDURE — 36415 COLL VENOUS BLD VENIPUNCTURE: CPT

## 2024-03-11 PROCEDURE — P9603 ONE-WAY ALLOW PRORATED MILES: HCPCS

## 2024-03-11 PROCEDURE — 85025 COMPLETE CBC W/AUTO DIFF WBC: CPT

## 2024-03-11 PROCEDURE — 80053 COMPREHEN METABOLIC PANEL: CPT

## 2024-03-18 ENCOUNTER — HOSPITAL ENCOUNTER (OUTPATIENT)
Age: 74
Setting detail: SPECIMEN
Discharge: HOME | End: 2024-03-18

## 2024-03-18 LAB
ALBUMIN SERPL-MCNC: 3.4 G/DL (ref 3.5–5.2)
ALBUMIN/GLOB SERPL: 1.1 {RATIO} (ref 1–2.5)
ALP SERPL-CCNC: 103 U/L (ref 35–104)
ALT SERPL-CCNC: 19 U/L (ref 5–33)
ANION GAP SERPL CALCULATED.3IONS-SCNC: 12 MMOL/L (ref 9–17)
AST SERPL-CCNC: 16 U/L
BASOPHILS # BLD: 0.07 K/UL (ref 0–0.2)
BASOPHILS NFR BLD: 1 % (ref 0–2)
BILIRUB SERPL-MCNC: 0.4 MG/DL (ref 0.3–1.2)
BUN SERPL-MCNC: 17 MG/DL (ref 8–23)
BUN/CREAT SERPL: 28 (ref 9–20)
CALCIUM SERPL-MCNC: 8.9 MG/DL (ref 8.6–10.4)
CHLORIDE SERPL-SCNC: 102 MMOL/L (ref 98–107)
CO2 SERPL-SCNC: 25 MMOL/L (ref 20–31)
CREAT SERPL-MCNC: 0.6 MG/DL (ref 0.5–0.9)
EOSINOPHIL # BLD: 0.21 K/UL (ref 0–0.44)
EOSINOPHILS RELATIVE PERCENT: 3 % (ref 1–4)
ERYTHROCYTE [DISTWIDTH] IN BLOOD BY AUTOMATED COUNT: 24.6 % (ref 11.8–14.4)
GFR SERPL CREATININE-BSD FRML MDRD: >60 ML/MIN/1.73M2
GLUCOSE SERPL-MCNC: 139 MG/DL (ref 70–99)
HCT VFR BLD AUTO: 31 % (ref 36.3–47.1)
HGB BLD-MCNC: 9.8 G/DL (ref 11.9–15.1)
IMM GRANULOCYTES # BLD AUTO: 0 K/UL (ref 0–0.3)
IMM GRANULOCYTES NFR BLD: 0 %
LYMPHOCYTES NFR BLD: 1.59 K/UL (ref 1.1–3.7)
LYMPHOCYTES RELATIVE PERCENT: 23 % (ref 24–43)
MCH RBC QN AUTO: 29.9 PG (ref 25.2–33.5)
MCHC RBC AUTO-ENTMCNC: 31.6 G/DL (ref 28.4–34.8)
MCV RBC AUTO: 94.5 FL (ref 82.6–102.9)
MONOCYTES NFR BLD: 0.83 K/UL (ref 0.1–1.2)
MONOCYTES NFR BLD: 12 % (ref 3–12)
MORPHOLOGY: ABNORMAL
MORPHOLOGY: ABNORMAL
NEUTROPHILS NFR BLD: 61 % (ref 36–65)
NEUTS SEG NFR BLD: 4.2 K/UL (ref 1.5–8.1)
NRBC BLD-RTO: 0 PER 100 WBC
PLATELET # BLD AUTO: 216 K/UL (ref 138–453)
PMV BLD AUTO: 10.4 FL (ref 8.1–13.5)
POTASSIUM SERPL-SCNC: 4.4 MMOL/L (ref 3.7–5.3)
PROT SERPL-MCNC: 6.6 G/DL (ref 6.4–8.3)
RBC # BLD AUTO: 3.28 M/UL (ref 3.95–5.11)
SODIUM SERPL-SCNC: 139 MMOL/L (ref 135–144)
WBC OTHER # BLD: 6.9 K/UL (ref 3.5–11.3)

## 2024-03-18 PROCEDURE — 85025 COMPLETE CBC W/AUTO DIFF WBC: CPT

## 2024-03-18 PROCEDURE — 36415 COLL VENOUS BLD VENIPUNCTURE: CPT

## 2024-03-18 PROCEDURE — 80053 COMPREHEN METABOLIC PANEL: CPT

## 2024-03-18 PROCEDURE — P9603 ONE-WAY ALLOW PRORATED MILES: HCPCS

## 2024-03-25 ENCOUNTER — HOSPITAL ENCOUNTER (OUTPATIENT)
Age: 74
Setting detail: SPECIMEN
Discharge: HOME | End: 2024-03-25

## 2024-04-03 ENCOUNTER — OFFICE VISIT (OUTPATIENT)
Dept: ONCOLOGY | Age: 74
End: 2024-04-03
Payer: MEDICARE

## 2024-04-03 VITALS
BODY MASS INDEX: 27.12 KG/M2 | WEIGHT: 168 LBS | DIASTOLIC BLOOD PRESSURE: 63 MMHG | RESPIRATION RATE: 18 BRPM | HEART RATE: 91 BPM | TEMPERATURE: 96.5 F | SYSTOLIC BLOOD PRESSURE: 121 MMHG

## 2024-04-03 DIAGNOSIS — C54.1 MALIGNANT NEOPLASM OF ENDOMETRIUM (HCC): Primary | ICD-10-CM

## 2024-04-03 DIAGNOSIS — C77.5 ADENOCARCINOMA METASTATIC TO PELVIC LYMPH NODE (HCC): ICD-10-CM

## 2024-04-03 PROCEDURE — 1123F ACP DISCUSS/DSCN MKR DOCD: CPT | Performed by: INTERNAL MEDICINE

## 2024-04-03 PROCEDURE — 99215 OFFICE O/P EST HI 40 MIN: CPT | Performed by: INTERNAL MEDICINE

## 2024-04-03 NOTE — PROGRESS NOTES
program.  While intending to generate a document that actually reflects the content of the visit, the document can still have some errors including those of syntax and sound a like substitutions which may escape proof reading.  It such instances, actual meaning can be extrapolated by contextual diversion.

## 2024-05-06 ENCOUNTER — HOSPITAL ENCOUNTER (OUTPATIENT)
Age: 74
End: 2024-05-06
Attending: INTERNAL MEDICINE
Payer: MEDICARE

## 2024-05-07 ENCOUNTER — HOSPITAL ENCOUNTER (OUTPATIENT)
Dept: CT IMAGING | Age: 74
Discharge: HOME | End: 2024-05-09
Attending: INTERNAL MEDICINE
Payer: MEDICARE

## 2024-05-07 ENCOUNTER — HOSPITAL ENCOUNTER (OUTPATIENT)
Dept: INFUSION THERAPY | Age: 74
Discharge: HOME | End: 2024-05-07
Payer: MEDICARE

## 2024-05-07 DIAGNOSIS — C77.5 ADENOCARCINOMA METASTATIC TO PELVIC LYMPH NODE (HCC): ICD-10-CM

## 2024-05-07 DIAGNOSIS — C54.1 MALIGNANT NEOPLASM OF ENDOMETRIUM (HCC): ICD-10-CM

## 2024-05-07 DIAGNOSIS — C54.9 SEROUS CARCINOMA OF BODY OF UTERUS (HCC): Primary | ICD-10-CM

## 2024-05-07 LAB
ALBUMIN SERPL-MCNC: 3.6 G/DL (ref 3.5–5.2)
ALBUMIN/GLOB SERPL: 1.1 {RATIO} (ref 1–2.5)
ALP SERPL-CCNC: 80 U/L (ref 35–104)
ALT SERPL-CCNC: 14 U/L (ref 5–33)
ANION GAP SERPL CALCULATED.3IONS-SCNC: 12 MMOL/L (ref 9–17)
AST SERPL-CCNC: 15 U/L
BASOPHILS # BLD: 0.03 K/UL (ref 0–0.2)
BASOPHILS NFR BLD: 1 % (ref 0–2)
BILIRUB SERPL-MCNC: 0.4 MG/DL (ref 0.3–1.2)
BUN SERPL-MCNC: 10 MG/DL (ref 8–23)
BUN/CREAT SERPL: 14 (ref 9–20)
CALCIUM SERPL-MCNC: 8.9 MG/DL (ref 8.6–10.4)
CANCER AG125 SERPL-ACNC: 8 U/ML (ref 0–38)
CHLORIDE SERPL-SCNC: 101 MMOL/L (ref 98–107)
CO2 SERPL-SCNC: 24 MMOL/L (ref 20–31)
CREAT SERPL-MCNC: 0.7 MG/DL (ref 0.5–0.9)
EOSINOPHIL # BLD: 0.26 K/UL (ref 0–0.44)
EOSINOPHILS RELATIVE PERCENT: 5 % (ref 1–4)
ERYTHROCYTE [DISTWIDTH] IN BLOOD BY AUTOMATED COUNT: 14.9 % (ref 11.8–14.4)
GFR, ESTIMATED: >90 ML/MIN/1.73M2
GLUCOSE SERPL-MCNC: 245 MG/DL (ref 70–99)
HCT VFR BLD AUTO: 33.1 % (ref 36.3–47.1)
HGB BLD-MCNC: 10.8 G/DL (ref 11.9–15.1)
IMM GRANULOCYTES # BLD AUTO: <0.03 K/UL (ref 0–0.3)
IMM GRANULOCYTES NFR BLD: 0 %
LYMPHOCYTES NFR BLD: 0.94 K/UL (ref 1.1–3.7)
LYMPHOCYTES RELATIVE PERCENT: 18 % (ref 24–43)
MCH RBC QN AUTO: 31.9 PG (ref 25.2–33.5)
MCHC RBC AUTO-ENTMCNC: 32.6 G/DL (ref 28.4–34.8)
MCV RBC AUTO: 97.6 FL (ref 82.6–102.9)
MONOCYTES NFR BLD: 0.43 K/UL (ref 0.1–1.2)
MONOCYTES NFR BLD: 8 % (ref 3–12)
NEUTROPHILS NFR BLD: 68 % (ref 36–65)
NEUTS SEG NFR BLD: 3.47 K/UL (ref 1.5–8.1)
NRBC BLD-RTO: 0 PER 100 WBC
PLATELET # BLD AUTO: 235 K/UL (ref 138–453)
PMV BLD AUTO: 9.7 FL (ref 8.1–13.5)
POTASSIUM SERPL-SCNC: 3.7 MMOL/L (ref 3.7–5.3)
PROT SERPL-MCNC: 6.8 G/DL (ref 6.4–8.3)
RBC # BLD AUTO: 3.39 M/UL (ref 3.95–5.11)
SODIUM SERPL-SCNC: 137 MMOL/L (ref 135–144)
WBC OTHER # BLD: 5.2 K/UL (ref 3.5–11.3)

## 2024-05-07 PROCEDURE — 2580000003 HC RX 258: Performed by: INTERNAL MEDICINE

## 2024-05-07 PROCEDURE — 6360000002 HC RX W HCPCS: Performed by: INTERNAL MEDICINE

## 2024-05-07 PROCEDURE — 86304 IMMUNOASSAY TUMOR CA 125: CPT

## 2024-05-07 PROCEDURE — 71260 CT THORAX DX C+: CPT

## 2024-05-07 PROCEDURE — 85025 COMPLETE CBC W/AUTO DIFF WBC: CPT

## 2024-05-07 PROCEDURE — 6360000004 HC RX CONTRAST MEDICATION: Performed by: INTERNAL MEDICINE

## 2024-05-07 PROCEDURE — 36591 DRAW BLOOD OFF VENOUS DEVICE: CPT

## 2024-05-07 PROCEDURE — 36415 COLL VENOUS BLD VENIPUNCTURE: CPT

## 2024-05-07 PROCEDURE — 80053 COMPREHEN METABOLIC PANEL: CPT

## 2024-05-07 RX ORDER — HEPARIN 100 UNIT/ML
500 SYRINGE INTRAVENOUS PRN
Status: DISCONTINUED | OUTPATIENT
Start: 2024-05-07 | End: 2024-05-08 | Stop reason: HOSPADM

## 2024-05-07 RX ORDER — HEPARIN 100 UNIT/ML
500 SYRINGE INTRAVENOUS PRN
OUTPATIENT
Start: 2024-05-07

## 2024-05-07 RX ORDER — SODIUM CHLORIDE 0.9 % (FLUSH) 0.9 %
5-40 SYRINGE (ML) INJECTION PRN
OUTPATIENT
Start: 2024-05-07

## 2024-05-07 RX ORDER — SODIUM CHLORIDE 0.9 % (FLUSH) 0.9 %
5-40 SYRINGE (ML) INJECTION PRN
Status: DISCONTINUED | OUTPATIENT
Start: 2024-05-07 | End: 2024-05-08 | Stop reason: HOSPADM

## 2024-05-07 RX ORDER — SODIUM CHLORIDE 9 MG/ML
25 INJECTION, SOLUTION INTRAVENOUS PRN
OUTPATIENT
Start: 2024-05-07

## 2024-05-07 RX ADMIN — SODIUM CHLORIDE, PRESERVATIVE FREE 10 ML: 5 INJECTION INTRAVENOUS at 09:20

## 2024-05-07 RX ADMIN — SODIUM CHLORIDE, PRESERVATIVE FREE 10 ML: 5 INJECTION INTRAVENOUS at 09:18

## 2024-05-07 RX ADMIN — SODIUM CHLORIDE, PRESERVATIVE FREE 10 ML: 5 INJECTION INTRAVENOUS at 11:06

## 2024-05-07 RX ADMIN — IOPAMIDOL 75 ML: 755 INJECTION, SOLUTION INTRAVENOUS at 10:54

## 2024-05-07 RX ADMIN — IOPAMIDOL 18 ML: 755 INJECTION, SOLUTION INTRAVENOUS at 09:35

## 2024-05-07 RX ADMIN — Medication 500 UNITS: at 11:06

## 2024-05-07 RX ADMIN — SODIUM CHLORIDE, PRESERVATIVE FREE 10 ML: 5 INJECTION INTRAVENOUS at 09:19

## 2024-05-15 ENCOUNTER — OFFICE VISIT (OUTPATIENT)
Dept: ONCOLOGY | Age: 74
End: 2024-05-15
Payer: MEDICARE

## 2024-05-15 VITALS
SYSTOLIC BLOOD PRESSURE: 102 MMHG | DIASTOLIC BLOOD PRESSURE: 65 MMHG | TEMPERATURE: 96.9 F | BODY MASS INDEX: 26.31 KG/M2 | HEART RATE: 97 BPM | WEIGHT: 163 LBS | RESPIRATION RATE: 18 BRPM

## 2024-05-15 DIAGNOSIS — C54.1 MALIGNANT NEOPLASM OF ENDOMETRIUM (HCC): Primary | ICD-10-CM

## 2024-05-15 PROCEDURE — 99214 OFFICE O/P EST MOD 30 MIN: CPT | Performed by: INTERNAL MEDICINE

## 2024-05-15 PROCEDURE — 1123F ACP DISCUSS/DSCN MKR DOCD: CPT | Performed by: INTERNAL MEDICINE

## 2024-05-16 NOTE — PROGRESS NOTES
staging is T3 aN1 M0  Patient did well postoperatively.  She was referred to us for consideration of chemoimmunotherapy based on guidelines.  She is sent to us for a consultation, covering nicely from surgery.  Case was discussed with GYN oncology as needed that she might.    The patient was started on carboplatin/Taxol/Dostarlimab.  Chemotherapy was relatively poorly tolerated.  After 3 cycles, she was admitted to the hospital with COVID, UTI, sepsis and was in the hospital for about a week and then transition to rehab for a month.  We held chemotherapy awaiting recovery.    INTERIM History:  Seen for follow up uterine cancer on chemotherapy.  The initial treatment was tolerated fairly well.  Patient received total of 3 cycles.  She was hospitalized 2 months ago due to multiple problems including COVID infection and UTI and pneumonia.  It was followed by rehab stent for about a month.  Now she is at home   She remains severely weak.  Patient had some improvement since her last visit.  However continues to have poor performance.  Performance score is about 2.  She has significant problems with neuropathy in addition to balance and weakness.  No nausea or vomiting.  No abdominal pain.       PAST MEDICAL HISTORY: has a past medical history of Arthritis, Cataract, Cerebral artery occlusion with cerebral infarction (HCC), Hypertension, Wears dentures, and Wellness examination.    PAST SURGICAL HISTORY: has a past surgical history that includes Carpal tunnel release (Bilateral); Cataract removal (Bilateral); Insertable Cardiac Monitor; Total hip arthroplasty (Right); Total knee arthroplasty (Bilateral); Laparoscopic hysterectomy (Bilateral, 10/25/2023); Hysterectomy (N/A, 10/25/2023); and IR PORT PLACEMENT > 5 YEARS (12/13/2023).     CURRENT MEDICATIONS:  has a current medication list which includes the following prescription(s): bisacodyl, magnesium hydroxide, aluminum & magnesium hydroxide-simethicone, acetaminophen,

## 2024-05-21 ENCOUNTER — TELEPHONE (OUTPATIENT)
Dept: ONCOLOGY | Age: 74
End: 2024-05-21

## 2024-05-21 NOTE — TELEPHONE ENCOUNTER
Patient calls in and states that for now she has decided to continue with observation and monitoring rather than to have immunotherapy.  Expressed she can call us any time should she change her mind and wish to have treatment resumed.  Will follow with MD for observation at this time.

## 2024-06-18 ENCOUNTER — HOSPITAL ENCOUNTER (OUTPATIENT)
Dept: INFUSION THERAPY | Age: 74
Discharge: HOME | End: 2024-06-18
Payer: MEDICARE

## 2024-06-18 VITALS
SYSTOLIC BLOOD PRESSURE: 128 MMHG | HEART RATE: 108 BPM | TEMPERATURE: 97.4 F | DIASTOLIC BLOOD PRESSURE: 69 MMHG | RESPIRATION RATE: 18 BRPM

## 2024-06-18 DIAGNOSIS — C54.9 SEROUS CARCINOMA OF BODY OF UTERUS (HCC): Primary | ICD-10-CM

## 2024-06-18 PROCEDURE — 6360000002 HC RX W HCPCS: Performed by: INTERNAL MEDICINE

## 2024-06-18 PROCEDURE — 2580000003 HC RX 258: Performed by: INTERNAL MEDICINE

## 2024-06-18 PROCEDURE — 96523 IRRIG DRUG DELIVERY DEVICE: CPT

## 2024-06-18 RX ORDER — SODIUM CHLORIDE 0.9 % (FLUSH) 0.9 %
5-40 SYRINGE (ML) INJECTION PRN
OUTPATIENT
Start: 2024-06-18

## 2024-06-18 RX ORDER — HEPARIN 100 UNIT/ML
500 SYRINGE INTRAVENOUS PRN
OUTPATIENT
Start: 2024-06-18

## 2024-06-18 RX ORDER — SODIUM CHLORIDE 9 MG/ML
25 INJECTION, SOLUTION INTRAVENOUS PRN
OUTPATIENT
Start: 2024-06-18

## 2024-06-18 RX ORDER — HEPARIN 100 UNIT/ML
500 SYRINGE INTRAVENOUS PRN
Status: DISCONTINUED | OUTPATIENT
Start: 2024-06-18 | End: 2024-06-19 | Stop reason: HOSPADM

## 2024-06-18 RX ORDER — SODIUM CHLORIDE 0.9 % (FLUSH) 0.9 %
5-40 SYRINGE (ML) INJECTION PRN
Status: DISCONTINUED | OUTPATIENT
Start: 2024-06-18 | End: 2024-06-19 | Stop reason: HOSPADM

## 2024-06-18 RX ADMIN — SODIUM CHLORIDE, PRESERVATIVE FREE 10 ML: 5 INJECTION INTRAVENOUS at 13:08

## 2024-06-18 RX ADMIN — SODIUM CHLORIDE, PRESERVATIVE FREE 10 ML: 5 INJECTION INTRAVENOUS at 13:07

## 2024-06-18 RX ADMIN — HEPARIN 500 UNITS: 100 SYRINGE at 13:08

## 2024-08-08 ENCOUNTER — POST-OP TELEPHONE (OUTPATIENT)
Dept: INFUSION THERAPY | Age: 74
End: 2024-08-08

## 2024-08-08 ENCOUNTER — TELEPHONE (OUTPATIENT)
Dept: ONCOLOGY | Age: 74
End: 2024-08-08

## 2024-08-08 ENCOUNTER — HOSPITAL ENCOUNTER (OUTPATIENT)
Dept: INFUSION THERAPY | Age: 74
Discharge: HOME | End: 2024-08-08
Payer: MEDICARE

## 2024-08-08 DIAGNOSIS — C77.5 ADENOCARCINOMA METASTATIC TO PELVIC LYMPH NODE (HCC): ICD-10-CM

## 2024-08-08 DIAGNOSIS — C54.9 SEROUS CARCINOMA OF BODY OF UTERUS (HCC): Primary | ICD-10-CM

## 2024-08-08 DIAGNOSIS — C54.1 MALIGNANT NEOPLASM OF ENDOMETRIUM (HCC): ICD-10-CM

## 2024-08-08 LAB
ALBUMIN SERPL-MCNC: 4.1 G/DL (ref 3.5–5.2)
ALBUMIN/GLOB SERPL: 1.4 {RATIO} (ref 1–2.5)
ALP SERPL-CCNC: 78 U/L (ref 35–104)
ALT SERPL-CCNC: 15 U/L (ref 5–33)
ANION GAP SERPL CALCULATED.3IONS-SCNC: 14 MMOL/L (ref 9–17)
AST SERPL-CCNC: 14 U/L
BASOPHILS # BLD: 0.03 K/UL (ref 0–0.2)
BASOPHILS NFR BLD: 1 % (ref 0–2)
BILIRUB SERPL-MCNC: 0.6 MG/DL (ref 0.3–1.2)
BUN SERPL-MCNC: 11 MG/DL (ref 8–23)
BUN/CREAT SERPL: 16 (ref 9–20)
CALCIUM SERPL-MCNC: 9.2 MG/DL (ref 8.6–10.4)
CHLORIDE SERPL-SCNC: 95 MMOL/L (ref 98–107)
CO2 SERPL-SCNC: 25 MMOL/L (ref 20–31)
CREAT SERPL-MCNC: 0.7 MG/DL (ref 0.5–0.9)
EOSINOPHIL # BLD: 0.09 K/UL (ref 0–0.44)
EOSINOPHILS RELATIVE PERCENT: 1 % (ref 1–4)
ERYTHROCYTE [DISTWIDTH] IN BLOOD BY AUTOMATED COUNT: 13.7 % (ref 11.8–14.4)
GFR, ESTIMATED: >90 ML/MIN/1.73M2
GLUCOSE SERPL-MCNC: 443 MG/DL (ref 70–99)
HCT VFR BLD AUTO: 35.7 % (ref 36.3–47.1)
HGB BLD-MCNC: 12.1 G/DL (ref 11.9–15.1)
IMM GRANULOCYTES # BLD AUTO: <0.03 K/UL (ref 0–0.3)
IMM GRANULOCYTES NFR BLD: 0 %
LYMPHOCYTES NFR BLD: 1.18 K/UL (ref 1.1–3.7)
LYMPHOCYTES RELATIVE PERCENT: 19 % (ref 24–43)
MCH RBC QN AUTO: 32.3 PG (ref 25.2–33.5)
MCHC RBC AUTO-ENTMCNC: 33.9 G/DL (ref 28.4–34.8)
MCV RBC AUTO: 95.2 FL (ref 82.6–102.9)
MONOCYTES NFR BLD: 0.5 K/UL (ref 0.1–1.2)
MONOCYTES NFR BLD: 8 % (ref 3–12)
NEUTROPHILS NFR BLD: 71 % (ref 36–65)
NEUTS SEG NFR BLD: 4.44 K/UL (ref 1.5–8.1)
NRBC BLD-RTO: 0 PER 100 WBC
PLATELET # BLD AUTO: 229 K/UL (ref 138–453)
PMV BLD AUTO: 10.1 FL (ref 8.1–13.5)
POTASSIUM SERPL-SCNC: 4.1 MMOL/L (ref 3.7–5.3)
PROT SERPL-MCNC: 7 G/DL (ref 6.4–8.3)
RBC # BLD AUTO: 3.75 M/UL (ref 3.95–5.11)
SODIUM SERPL-SCNC: 134 MMOL/L (ref 135–144)
WBC OTHER # BLD: 6.3 K/UL (ref 3.5–11.3)

## 2024-08-08 PROCEDURE — 80053 COMPREHEN METABOLIC PANEL: CPT

## 2024-08-08 PROCEDURE — 85025 COMPLETE CBC W/AUTO DIFF WBC: CPT

## 2024-08-08 PROCEDURE — 86304 IMMUNOASSAY TUMOR CA 125: CPT

## 2024-08-08 PROCEDURE — 36591 DRAW BLOOD OFF VENOUS DEVICE: CPT

## 2024-08-08 PROCEDURE — 2580000003 HC RX 258: Performed by: INTERNAL MEDICINE

## 2024-08-08 PROCEDURE — 6360000002 HC RX W HCPCS: Performed by: INTERNAL MEDICINE

## 2024-08-08 RX ORDER — HEPARIN 100 UNIT/ML
500 SYRINGE INTRAVENOUS PRN
OUTPATIENT
Start: 2024-08-08

## 2024-08-08 RX ORDER — SODIUM CHLORIDE 0.9 % (FLUSH) 0.9 %
5-40 SYRINGE (ML) INJECTION PRN
Status: DISCONTINUED | OUTPATIENT
Start: 2024-08-08 | End: 2024-08-09 | Stop reason: HOSPADM

## 2024-08-08 RX ORDER — SODIUM CHLORIDE 9 MG/ML
25 INJECTION, SOLUTION INTRAVENOUS PRN
OUTPATIENT
Start: 2024-08-08

## 2024-08-08 RX ORDER — SODIUM CHLORIDE 0.9 % (FLUSH) 0.9 %
5-40 SYRINGE (ML) INJECTION PRN
OUTPATIENT
Start: 2024-08-08

## 2024-08-08 RX ORDER — HEPARIN 100 UNIT/ML
500 SYRINGE INTRAVENOUS PRN
Status: DISCONTINUED | OUTPATIENT
Start: 2024-08-08 | End: 2024-08-09 | Stop reason: HOSPADM

## 2024-08-08 RX ADMIN — SODIUM CHLORIDE, PRESERVATIVE FREE 10 ML: 5 INJECTION INTRAVENOUS at 13:27

## 2024-08-08 RX ADMIN — HEPARIN 500 UNITS: 100 SYRINGE at 13:28

## 2024-08-08 RX ADMIN — SODIUM CHLORIDE, PRESERVATIVE FREE 10 ML: 5 INJECTION INTRAVENOUS at 13:26

## 2024-08-08 RX ADMIN — SODIUM CHLORIDE, PRESERVATIVE FREE 10 ML: 5 INJECTION INTRAVENOUS at 13:28

## 2024-08-08 NOTE — PLAN OF CARE
Problem: Chronic Conditions and Co-morbidities  Goal: Patient's chronic conditions and co-morbidity symptoms are monitored and maintained or improved  Outcome: Adequate for Discharge     Problem: Safety - Adult  Goal: Absence of infection signs and symptoms  Outcome: Adequate for Discharge     Problem: Chronic Conditions and Co-morbidities  Goal: Patient's chronic conditions and co-morbidity symptoms are monitored and maintained or improved  Outcome: Adequate for Discharge

## 2024-08-08 NOTE — TELEPHONE ENCOUNTER
Called and spoke with Renetta VIVAR labs completed today.  Informed her blood glucose level critically high at 443.  Requested she call her PCP to report and determine plan of care.  She verbalizes understanding and will call her PCP.  Dr. Venegas also aware of situation and requests PCP manage.

## 2024-08-09 LAB — CANCER AG125 SERPL-ACNC: 9 U/ML (ref 0–38)

## 2024-08-21 ENCOUNTER — OFFICE VISIT (OUTPATIENT)
Dept: ONCOLOGY | Age: 74
End: 2024-08-21
Payer: MEDICARE

## 2024-08-21 VITALS
BODY MASS INDEX: 24.86 KG/M2 | WEIGHT: 154 LBS | TEMPERATURE: 97.4 F | DIASTOLIC BLOOD PRESSURE: 74 MMHG | HEART RATE: 85 BPM | SYSTOLIC BLOOD PRESSURE: 126 MMHG | RESPIRATION RATE: 18 BRPM

## 2024-08-21 DIAGNOSIS — C54.1 MALIGNANT NEOPLASM OF ENDOMETRIUM (HCC): Primary | ICD-10-CM

## 2024-08-21 PROCEDURE — 1123F ACP DISCUSS/DSCN MKR DOCD: CPT | Performed by: INTERNAL MEDICINE

## 2024-08-21 PROCEDURE — 99214 OFFICE O/P EST MOD 30 MIN: CPT | Performed by: INTERNAL MEDICINE

## 2024-08-21 RX ORDER — BLOOD-GLUCOSE METER
EACH MISCELLANEOUS
COMMUNITY
Start: 2024-08-08

## 2024-08-21 RX ORDER — SITAGLIPTIN AND METFORMIN HYDROCHLORIDE 50; 500 MG/1; MG/1
1 TABLET, FILM COATED, EXTENDED RELEASE ORAL DAILY
COMMUNITY
Start: 2024-08-12

## 2024-08-21 NOTE — PATIENT INSTRUCTIONS
Continue to flush the port and draw  labs cbc, cmp  every 6 weeks, rv in 3 months, no plans for treatment

## 2024-08-21 NOTE — PROGRESS NOTES
Chief Complaint   Patient presents with    Follow-up     Malignant neoplasm of endometrium          DIAGNOSIS:   Stage IIIA (FIGO stage IIIc1)  ([id number]) endometrial cancer, High grade serous carcinoma.   CURRENT THERAPY:  Status post radical hysterectomy, bilateral salpingo-oophorectomy and lymph node dissection 10/25/2023  chemoimmunotherapy with carboplatin, Taxol and Dostarlimab postoperatively  Chemotherapy has been poorly tolerated leading to hospitalization and prolonged rehab stent  Holding chemotherapy waiting for recovery  BRIEF CASE HISTORY:   Renetta Malcolm is a very pleasant 73 y.o. female who is referred to us for recently diagnosed endometrial cancer.  She presented with postmenopausal bleeding.  She was initially on anticoagulation and the bleeding was thought to be due to anticoagulation.  However further work-up showed an ultrasound that showed thickened endometrium.  In 2019, underwent endometrial sampling that was benign.  Because of persistent symptoms, CT scan was done in October/23 and revealed a hypodensity endometrial canal concerning for endometrial mass.  Biopsy showed serous carcinoma on 10/9/2023.  The patient was referred to gynecology oncology and ultimately underwent robotic assisted radical hysterectomy and lymph node sampling on 10/25/2023.  The tumor measured 5.4 cm and invaded through the [employer] atrium to the serosal surface.  There was significant lymphovascular invasion.  Ovaries and fallopian tubes were negative.  There was metastatic carcinoma that present on the right obturator and external iliac (bilateral)  lymph nodes.  Pelvic washings were positive for non-small cell carcinoma .of note, that the right obturator node was stuck to the obturator canal and was not removed for risk of bleeding.  Paraortic lymph nodes were negative.  Pathological staging is T3 aN1 M0  Patient did well postoperatively.  She was referred to us for consideration of chemoimmunotherapy based

## 2024-08-29 ENCOUNTER — TELEPHONE (OUTPATIENT)
Dept: GYNECOLOGIC ONCOLOGY | Age: 74
End: 2024-08-29

## 2024-09-24 ENCOUNTER — HOSPITAL ENCOUNTER (OUTPATIENT)
Dept: INFUSION THERAPY | Age: 74
Discharge: HOME | End: 2024-09-24
Payer: MEDICARE

## 2024-09-24 DIAGNOSIS — C54.9 SEROUS CARCINOMA OF BODY OF UTERUS (HCC): Primary | ICD-10-CM

## 2024-09-24 DIAGNOSIS — C54.1 MALIGNANT NEOPLASM OF ENDOMETRIUM (HCC): ICD-10-CM

## 2024-09-24 DIAGNOSIS — C77.5 ADENOCARCINOMA METASTATIC TO PELVIC LYMPH NODE (HCC): ICD-10-CM

## 2024-09-24 LAB
ALBUMIN SERPL-MCNC: 4 G/DL (ref 3.5–5.2)
ALBUMIN/GLOB SERPL: 1.4 {RATIO} (ref 1–2.5)
ALP SERPL-CCNC: 72 U/L (ref 35–104)
ALT SERPL-CCNC: 15 U/L (ref 10–35)
ANION GAP SERPL CALCULATED.3IONS-SCNC: 11 MMOL/L (ref 9–16)
AST SERPL-CCNC: 14 U/L (ref 10–35)
BASOPHILS # BLD: 0.04 K/UL (ref 0–0.2)
BASOPHILS NFR BLD: 1 % (ref 0–2)
BILIRUB SERPL-MCNC: 0.5 MG/DL (ref 0–1.2)
BUN SERPL-MCNC: 12 MG/DL (ref 8–23)
BUN/CREAT SERPL: 17 (ref 9–20)
CALCIUM SERPL-MCNC: 9.3 MG/DL (ref 8.6–10.4)
CHLORIDE SERPL-SCNC: 100 MMOL/L (ref 98–107)
CO2 SERPL-SCNC: 26 MMOL/L (ref 20–31)
CREAT SERPL-MCNC: 0.7 MG/DL (ref 0.5–0.9)
EOSINOPHIL # BLD: 0.19 K/UL (ref 0–0.44)
EOSINOPHILS RELATIVE PERCENT: 3 % (ref 1–4)
ERYTHROCYTE [DISTWIDTH] IN BLOOD BY AUTOMATED COUNT: 13.8 % (ref 11.8–14.4)
GFR, ESTIMATED: >90 ML/MIN/1.73M2
GLUCOSE SERPL-MCNC: 164 MG/DL (ref 74–99)
HCT VFR BLD AUTO: 35.4 % (ref 36.3–47.1)
HGB BLD-MCNC: 11.9 G/DL (ref 11.9–15.1)
IMM GRANULOCYTES # BLD AUTO: <0.03 K/UL (ref 0–0.3)
IMM GRANULOCYTES NFR BLD: 0 %
LYMPHOCYTES NFR BLD: 1.18 K/UL (ref 1.1–3.7)
LYMPHOCYTES RELATIVE PERCENT: 19 % (ref 24–43)
MCH RBC QN AUTO: 33 PG (ref 25.2–33.5)
MCHC RBC AUTO-ENTMCNC: 33.6 G/DL (ref 28.4–34.8)
MCV RBC AUTO: 98.1 FL (ref 82.6–102.9)
MONOCYTES NFR BLD: 0.59 K/UL (ref 0.1–1.2)
MONOCYTES NFR BLD: 10 % (ref 3–12)
NEUTROPHILS NFR BLD: 67 % (ref 36–65)
NEUTS SEG NFR BLD: 4.07 K/UL (ref 1.5–8.1)
NRBC BLD-RTO: 0 PER 100 WBC
PLATELET # BLD AUTO: 235 K/UL (ref 138–453)
PMV BLD AUTO: 9.5 FL (ref 8.1–13.5)
POTASSIUM SERPL-SCNC: 4.1 MMOL/L (ref 3.7–5.3)
PROT SERPL-MCNC: 6.8 G/DL (ref 6.6–8.7)
RBC # BLD AUTO: 3.61 M/UL (ref 3.95–5.11)
SODIUM SERPL-SCNC: 137 MMOL/L (ref 136–145)
WBC OTHER # BLD: 6.1 K/UL (ref 3.5–11.3)

## 2024-09-24 PROCEDURE — 36591 DRAW BLOOD OFF VENOUS DEVICE: CPT

## 2024-09-24 PROCEDURE — 6360000002 HC RX W HCPCS: Performed by: INTERNAL MEDICINE

## 2024-09-24 PROCEDURE — 85025 COMPLETE CBC W/AUTO DIFF WBC: CPT

## 2024-09-24 PROCEDURE — 36415 COLL VENOUS BLD VENIPUNCTURE: CPT

## 2024-09-24 PROCEDURE — 2580000003 HC RX 258: Performed by: INTERNAL MEDICINE

## 2024-09-24 PROCEDURE — 80053 COMPREHEN METABOLIC PANEL: CPT

## 2024-09-24 PROCEDURE — 86304 IMMUNOASSAY TUMOR CA 125: CPT

## 2024-09-24 RX ORDER — HEPARIN 100 UNIT/ML
500 SYRINGE INTRAVENOUS PRN
Status: DISCONTINUED | OUTPATIENT
Start: 2024-09-24 | End: 2024-09-25 | Stop reason: HOSPADM

## 2024-09-24 RX ORDER — SODIUM CHLORIDE 0.9 % (FLUSH) 0.9 %
5-40 SYRINGE (ML) INJECTION PRN
OUTPATIENT
Start: 2024-09-24

## 2024-09-24 RX ORDER — HEPARIN 100 UNIT/ML
500 SYRINGE INTRAVENOUS PRN
OUTPATIENT
Start: 2024-09-24

## 2024-09-24 RX ORDER — SODIUM CHLORIDE 9 MG/ML
25 INJECTION, SOLUTION INTRAVENOUS PRN
OUTPATIENT
Start: 2024-09-24

## 2024-09-24 RX ORDER — SODIUM CHLORIDE 0.9 % (FLUSH) 0.9 %
5-40 SYRINGE (ML) INJECTION PRN
Status: DISCONTINUED | OUTPATIENT
Start: 2024-09-24 | End: 2024-09-25 | Stop reason: HOSPADM

## 2024-09-24 RX ADMIN — SODIUM CHLORIDE, PRESERVATIVE FREE 10 ML: 5 INJECTION INTRAVENOUS at 13:49

## 2024-09-24 RX ADMIN — SODIUM CHLORIDE, PRESERVATIVE FREE 10 ML: 5 INJECTION INTRAVENOUS at 13:51

## 2024-09-24 RX ADMIN — HEPARIN 500 UNITS: 100 SYRINGE at 13:50

## 2024-09-24 RX ADMIN — SODIUM CHLORIDE, PRESERVATIVE FREE 10 ML: 5 INJECTION INTRAVENOUS at 13:50

## 2024-09-25 LAB — CANCER AG125 SERPL-ACNC: 6 U/ML (ref 0–38)

## 2024-09-30 NOTE — PLAN OF CARE
Problem: Chronic Conditions and Co-morbidities  Goal: Patient's chronic conditions and co-morbidity symptoms are monitored and maintained or improved  Outcome: Adequate for Discharge     Problem: Safety - Adult  Goal: Free from fall injury  Outcome: Adequate for Discharge     
Clothing

## 2024-10-10 ENCOUNTER — HOSPITAL ENCOUNTER (OUTPATIENT)
Dept: DIABETES SERVICES | Age: 74
Setting detail: THERAPIES SERIES
Discharge: HOME | End: 2024-10-10
Payer: MEDICARE

## 2024-10-10 PROCEDURE — G0108 DIAB MANAGE TRN  PER INDIV: HCPCS

## 2024-10-10 SDOH — ECONOMIC STABILITY: FOOD INSECURITY: ADDITIONAL INFORMATION: NO

## 2024-10-10 ASSESSMENT — PROBLEM AREAS IN DIABETES QUESTIONNAIRE (PAID)
PAID-5 TOTAL SCORE: 0
FEELING DEPRESSED WHEN YOU THINK ABOUT LIVING WITH DIABETES: NOT A PROBLEM
COPING WITH COMPLICATIONS OF DIABETES: NOT A PROBLEM
FEELING SCARED WHEN YOU THINK ABOUT LIVING WITH DIABETES: NOT A PROBLEM
FEELING THAT DIABETES IS TAKING UP TOO MUCH OF YOUR MENTAL AND PHYSICAL ENERGY EVERY DAY: NOT A PROBLEM

## 2024-10-10 ASSESSMENT — SLEEP AND FATIGUE QUESTIONNAIRES
HOW DO YOU RATE THE QUALITY OF YOUR SLEEP: GOOD
HAVE YOU EVER BEEN TESTED FOR SLEEP APNEA: NO
HAVE YOU BEEN TOLD, OR NOTICED ON YOUR OWN, THAT YOU STOP BREATHING OR STRUGGLE TO BREATHE IN YOUR SLEEP: NO

## 2024-10-10 ASSESSMENT — PATIENT HEALTH QUESTIONNAIRE - PHQ9
SUM OF ALL RESPONSES TO PHQ9 QUESTIONS 1 & 2: 0
SUM OF ALL RESPONSES TO PHQ QUESTIONS 1-9: 0
SUM OF ALL RESPONSES TO PHQ QUESTIONS 1-9: 0
1. LITTLE INTEREST OR PLEASURE IN DOING THINGS: NOT AT ALL
SUM OF ALL RESPONSES TO PHQ QUESTIONS 1-9: 0
2. FEELING DOWN, DEPRESSED OR HOPELESS: NOT AT ALL
SUM OF ALL RESPONSES TO PHQ QUESTIONS 1-9: 0

## 2024-10-10 NOTE — PROGRESS NOTES
Diabetes Self-Management Education Record    Progress Note:  Patient arrived to appointment with two sons for initial assessment for diabetes education for a new diagnosis of type 2 diabetes. Previously was told she had prediabetes. She reports she had blood drawn when getting port flushed and received a phone call that her glucose was elevated at 443 on 08/08/24. Glucose was drawn again Sept and was 164. She has started to monitor glucose levels with fasting range of 126 to 152. She has checked a few readings before meals with highest at 194. Denies family history of diabetes. She is puzzled what triggered her glucose to elevate so high. She was diagnosed with cancer and was doing chem but has not had dose since Feb. Denies any recent illness or infection or use of steroids. Her current treatment is Janumet 50/500 daily. Beverages include water and black coffee, and sometimes Propel. Has 3 meals and has stopped snacking at HS. Was having dessert type snacks in the evening prior to diagnosis. Consumes 3 meals a day. Breakfast is sometimes just a piece of toast, pop tart or waffle with a small amount of syrup. Lunch and supper tend to be more balanced. Needs to schedule eye exam and is having A1C drawn on the 15th. Foot exam is current. No formal exercise and uses walker and requested wheelchair to come to educator office.  We discuss risk factors for diabetes and effects of illness and stress on glucose. Allow her time to discuss thoughts and feelings on her health and diabetes. She is primarily interested in knowing what she can and can't eat and things to avoid. Discuss her glucose readings and review target range. Encourage her to vary testing times and check 2 hours after a meal. Discuss use of OTC CGM (Alanlo) however, she does not have a smart phone. Highly encourage protein be added to breakfast. Examples given and educated on effect of having carb by itself. She is given a list of protein and carb snacks as

## 2024-10-15 ENCOUNTER — HOSPITAL ENCOUNTER
Age: 74
Discharge: HOME | End: 2024-10-15
Payer: MEDICARE

## 2024-10-15 DIAGNOSIS — I10: ICD-10-CM

## 2024-10-15 DIAGNOSIS — I48.91: ICD-10-CM

## 2024-10-15 DIAGNOSIS — E78.00: ICD-10-CM

## 2024-10-15 DIAGNOSIS — Z12.31: Primary | ICD-10-CM

## 2024-10-15 DIAGNOSIS — I25.10: ICD-10-CM

## 2024-10-15 DIAGNOSIS — I67.2: ICD-10-CM

## 2024-10-15 DIAGNOSIS — E11.65: ICD-10-CM

## 2024-10-15 LAB
ADD MANUAL DIFF: NO
ALANINE AMINOTRANSFERASE: 26 U/L (ref 14–59)
ALBUMIN GLOBULIN RATIO: 0.9
ALBUMIN LEVEL: 3.6 G/DL (ref 3.4–5)
ALKALINE PHOSPHATASE: 71 U/L (ref 46–116)
ANION GAP: 15.6
ASPARTATE AMINO TRANSFERASE: 16 U/L (ref 15–37)
BLOOD UREA NITROGEN: 14 MG/DL (ref 7–18)
CALCIUM: 10 MG/DL (ref 8.5–10.1)
CARBON DIOXIDE: 25.5 MMOL/L (ref 21–32)
CHLORIDE: 104 MMOL/L (ref 98–107)
CHOLESTEROL: 97 MG/DL (ref ?–200)
CO2 BLD-SCNC: 25.5 MMOL/L (ref 21–32)
ESTIMATED GFR (AFRICAN AMERICA: >60
ESTIMATED GFR (NON-AFRICAN AME: >60
GLOBULIN: 3.9 G/DL
GLUCOSE BLD-MCNC: 132 MG/DL (ref 74–106)
HCT VFR BLD CALC: 38.7 % (ref 36–48)
HDL CHOLESTEROL: 59 MG/DL (ref 40–60)
HEMATOCRIT: 38.7 % (ref 36–48)
HEMOGLOBIN: 12.8 G/DL (ref 12–16)
IMMATURE GRANULOCYTES ABS AUTO: 0.02 10^3/UL (ref 0–0.03)
IMMATURE GRANULOCYTES PCT AUTO: 0.2 % (ref 0–0.5)
LYMPHOCYTES  ABSOLUTE AUTO: 1.4 10^3/UL (ref 1.2–3.8)
MCV RBC: 100.5 FL (ref 81–99)
MEAN CORPUSCULAR HEMOGLOBIN: 33.2 PG (ref 26.7–34)
MEAN CORPUSCULAR HGB CONC: 33.1 G/DL (ref 29.9–35.2)
MEAN CORPUSCULAR VOLUME: 100.5 FL (ref 81–99)
PLATELET # BLD: 260 10^3/UL (ref 150–450)
PLATELET COUNT: 260 10^3/UL (ref 150–450)
POTASSIUM SERPLBLD-SCNC: 4.1 MMOL/L (ref 3.5–5.1)
POTASSIUM: 4.1 MMOL/L (ref 3.5–5.1)
RED BLOOD COUNT: 3.85 10^6/UL (ref 4.2–5.4)
SODIUM BLD-SCNC: 141 MMOL/L (ref 136–145)
SODIUM: 141 MMOL/L (ref 136–145)
TOTAL PROTEIN: 7.5 G/DL (ref 6.4–8.2)
TRIGLYCERIDES: 39 MG/DL (ref ?–150)
VLDL CHOLESTEROL: 7.8 MG/DL
WBC # BLD: 9 10^3/UL (ref 4–11)
WHITE BLOOD COUNT: 9 10^3/UL (ref 4–11)

## 2024-10-15 PROCEDURE — 80053 COMPREHEN METABOLIC PANEL: CPT

## 2024-10-15 PROCEDURE — 77067 SCR MAMMO BI INCL CAD: CPT

## 2024-10-15 PROCEDURE — 80061 LIPID PANEL: CPT

## 2024-10-15 PROCEDURE — 85025 COMPLETE CBC W/AUTO DIFF WBC: CPT

## 2024-10-15 PROCEDURE — 82043 UR ALBUMIN QUANTITATIVE: CPT

## 2024-10-15 PROCEDURE — 83036 HEMOGLOBIN GLYCOSYLATED A1C: CPT

## 2024-10-15 PROCEDURE — 36415 COLL VENOUS BLD VENIPUNCTURE: CPT

## 2024-10-15 PROCEDURE — 77063 BREAST TOMOSYNTHESIS BI: CPT

## 2024-10-15 NOTE — MM_ITS
Patient Name:      
AMADEO BARCENAS 
      
MR#: [id number]      
: 1950 
Accession #: [id number] 
Exam Date: 10/15/2024  
Ordering Doctor: DR Magdy Hogan D.O. 
  
RADIOLOGY REPORT 
  
PROCEDURE:     MM TOMOSYNTHESIS SCREENING BI 
  
COMPARISON:     [facility] SCREEN 3D PIA CAD, 2022.  [facility] SCREEN 3D PIA  
CAD, 10/19/2021. 
  
INDICATIONS:     Screening 
  
Calculator Name             NCI Breast Cancer Risk Assessment Tool  
5 Year Breast Cancer Risk     2.10% 
Lifetime Breast Cancer Risk     5.00% 
Personal Breast Cancer      No 
Personal Ovarian Cancer     No 
Treatments     chemo, hysterectomy 
Family Cancers     None 
  
LOCATION:       [facility]  
  
BREAST COMPOSITION:     The breasts are almost entirely fatty. 
  
FINDINGS:       
DIAGNOSTIC CATEGORY 2--BENIGN FINDING. NO CHANGE FROM COMPARISON.   
  
Scattered benign-appearing nodules are present.  Scattered benign-appearing  
calcifications are present.  Scattered benign-appearing lymph nodes are  
present.   
  
RIGHT BREAST:  No significant suspicious finding.   
  
LEFT BREAST:  No significant suspicious finding.  Loop recorder 
  
RECOMMENDATIONS:      
ROUTINE MAMMOGRAM AND CLINICAL EVALUATION IN 12 MONTHS.   
  
PLEASE NOTE:  A NORMAL MAMMOGRAM DOES NOT EXCLUDE THE POSSIBILITY OF BREAST  
CANCER.  A CLINICALLY SUSPICIOUS PALPABLE LUMP SHOULD BE BIOPSIED.   
  
  
Dictated by: Rosalio Cerrato MD on 10/15/2024 at 13:45      
Approved by: Rosalio Cerrato MD on 10/15/2024 at 13:47

## 2024-11-05 ENCOUNTER — HOSPITAL ENCOUNTER (OUTPATIENT)
Dept: INFUSION THERAPY | Age: 74
Discharge: HOME | End: 2024-11-05
Payer: MEDICARE

## 2024-11-05 VITALS
SYSTOLIC BLOOD PRESSURE: 112 MMHG | TEMPERATURE: 97.6 F | RESPIRATION RATE: 18 BRPM | HEART RATE: 86 BPM | DIASTOLIC BLOOD PRESSURE: 65 MMHG

## 2024-11-05 DIAGNOSIS — C54.9 SEROUS CARCINOMA OF BODY OF UTERUS (HCC): Primary | ICD-10-CM

## 2024-11-05 DIAGNOSIS — C77.5 ADENOCARCINOMA METASTATIC TO PELVIC LYMPH NODE (HCC): ICD-10-CM

## 2024-11-05 DIAGNOSIS — C54.1 MALIGNANT NEOPLASM OF ENDOMETRIUM (HCC): ICD-10-CM

## 2024-11-05 LAB
ALBUMIN SERPL-MCNC: 3.9 G/DL (ref 3.5–5.2)
ALBUMIN/GLOB SERPL: 1.4 {RATIO} (ref 1–2.5)
ALP SERPL-CCNC: 66 U/L (ref 35–104)
ALT SERPL-CCNC: 19 U/L (ref 10–35)
ANION GAP SERPL CALCULATED.3IONS-SCNC: 12 MMOL/L (ref 9–16)
AST SERPL-CCNC: 16 U/L (ref 10–35)
BASOPHILS # BLD: 0.04 K/UL (ref 0–0.2)
BASOPHILS NFR BLD: 1 % (ref 0–2)
BILIRUB SERPL-MCNC: 0.6 MG/DL (ref 0–1.2)
BUN SERPL-MCNC: 10 MG/DL (ref 8–23)
BUN/CREAT SERPL: 17 (ref 9–20)
CALCIUM SERPL-MCNC: 9.5 MG/DL (ref 8.6–10.4)
CANCER AG125 SERPL-ACNC: 7 U/ML (ref 0–38)
CHLORIDE SERPL-SCNC: 101 MMOL/L (ref 98–107)
CO2 SERPL-SCNC: 27 MMOL/L (ref 20–31)
CREAT SERPL-MCNC: 0.6 MG/DL (ref 0.5–0.9)
EOSINOPHIL # BLD: 0.29 K/UL (ref 0–0.44)
EOSINOPHILS RELATIVE PERCENT: 5 % (ref 1–4)
ERYTHROCYTE [DISTWIDTH] IN BLOOD BY AUTOMATED COUNT: 13.2 % (ref 11.8–14.4)
GFR, ESTIMATED: >90 ML/MIN/1.73M2
GLUCOSE SERPL-MCNC: 126 MG/DL (ref 74–99)
HCT VFR BLD AUTO: 35.4 % (ref 36.3–47.1)
HGB BLD-MCNC: 11.6 G/DL (ref 11.9–15.1)
IMM GRANULOCYTES # BLD AUTO: <0.03 K/UL (ref 0–0.3)
IMM GRANULOCYTES NFR BLD: 0 %
LYMPHOCYTES NFR BLD: 1.37 K/UL (ref 1.1–3.7)
LYMPHOCYTES RELATIVE PERCENT: 22 % (ref 24–43)
MCH RBC QN AUTO: 33.4 PG (ref 25.2–33.5)
MCHC RBC AUTO-ENTMCNC: 32.8 G/DL (ref 28.4–34.8)
MCV RBC AUTO: 102 FL (ref 82.6–102.9)
MONOCYTES NFR BLD: 0.52 K/UL (ref 0.1–1.2)
MONOCYTES NFR BLD: 8 % (ref 3–12)
NEUTROPHILS NFR BLD: 64 % (ref 36–65)
NEUTS SEG NFR BLD: 3.94 K/UL (ref 1.5–8.1)
NRBC BLD-RTO: 0 PER 100 WBC
PLATELET # BLD AUTO: 247 K/UL (ref 138–453)
PMV BLD AUTO: 9.5 FL (ref 8.1–13.5)
POTASSIUM SERPL-SCNC: 4.1 MMOL/L (ref 3.7–5.3)
PROT SERPL-MCNC: 6.7 G/DL (ref 6.6–8.7)
RBC # BLD AUTO: 3.47 M/UL (ref 3.95–5.11)
SODIUM SERPL-SCNC: 140 MMOL/L (ref 136–145)
WBC OTHER # BLD: 6.2 K/UL (ref 3.5–11.3)

## 2024-11-05 PROCEDURE — 2580000003 HC RX 258: Performed by: INTERNAL MEDICINE

## 2024-11-05 PROCEDURE — 85025 COMPLETE CBC W/AUTO DIFF WBC: CPT

## 2024-11-05 PROCEDURE — 86304 IMMUNOASSAY TUMOR CA 125: CPT

## 2024-11-05 PROCEDURE — 80053 COMPREHEN METABOLIC PANEL: CPT

## 2024-11-05 PROCEDURE — 6360000002 HC RX W HCPCS: Performed by: INTERNAL MEDICINE

## 2024-11-05 PROCEDURE — 36591 DRAW BLOOD OFF VENOUS DEVICE: CPT

## 2024-11-05 RX ORDER — SODIUM CHLORIDE 9 MG/ML
25 INJECTION, SOLUTION INTRAVENOUS PRN
OUTPATIENT
Start: 2024-11-05

## 2024-11-05 RX ORDER — HEPARIN 100 UNIT/ML
500 SYRINGE INTRAVENOUS PRN
OUTPATIENT
Start: 2024-11-05

## 2024-11-05 RX ORDER — HEPARIN 100 UNIT/ML
500 SYRINGE INTRAVENOUS PRN
Status: DISCONTINUED | OUTPATIENT
Start: 2024-11-05 | End: 2024-11-06 | Stop reason: HOSPADM

## 2024-11-05 RX ORDER — SODIUM CHLORIDE 0.9 % (FLUSH) 0.9 %
5-40 SYRINGE (ML) INJECTION PRN
Status: DISCONTINUED | OUTPATIENT
Start: 2024-11-05 | End: 2024-11-06 | Stop reason: HOSPADM

## 2024-11-05 RX ORDER — SODIUM CHLORIDE 0.9 % (FLUSH) 0.9 %
5-40 SYRINGE (ML) INJECTION PRN
OUTPATIENT
Start: 2024-11-05

## 2024-11-05 RX ADMIN — SODIUM CHLORIDE, PRESERVATIVE FREE 10 ML: 5 INJECTION INTRAVENOUS at 13:29

## 2024-11-05 RX ADMIN — SODIUM CHLORIDE, PRESERVATIVE FREE 10 ML: 5 INJECTION INTRAVENOUS at 13:28

## 2024-11-05 RX ADMIN — HEPARIN 500 UNITS: 100 SYRINGE at 13:30

## 2024-11-20 ENCOUNTER — OFFICE VISIT (OUTPATIENT)
Dept: ONCOLOGY | Age: 74
End: 2024-11-20
Payer: MEDICARE

## 2024-11-20 VITALS
SYSTOLIC BLOOD PRESSURE: 128 MMHG | RESPIRATION RATE: 18 BRPM | TEMPERATURE: 96.9 F | BODY MASS INDEX: 25.02 KG/M2 | HEART RATE: 93 BPM | DIASTOLIC BLOOD PRESSURE: 68 MMHG | WEIGHT: 155 LBS

## 2024-11-20 DIAGNOSIS — C77.5 ADENOCARCINOMA METASTATIC TO PELVIC LYMPH NODE (HCC): Primary | ICD-10-CM

## 2024-11-20 DIAGNOSIS — C54.9 SEROUS CARCINOMA OF BODY OF UTERUS (HCC): ICD-10-CM

## 2024-11-20 DIAGNOSIS — C77.5 ADENOCARCINOMA METASTATIC TO PELVIC LYMPH NODE (HCC): ICD-10-CM

## 2024-11-20 DIAGNOSIS — C54.1 MALIGNANT NEOPLASM OF ENDOMETRIUM (HCC): Primary | ICD-10-CM

## 2024-11-20 DIAGNOSIS — C55 MALIGNANT NEOPLASM OF UTERUS, UNSPECIFIED SITE (HCC): ICD-10-CM

## 2024-11-20 PROCEDURE — 1159F MED LIST DOCD IN RCRD: CPT | Performed by: INTERNAL MEDICINE

## 2024-11-20 PROCEDURE — 1126F AMNT PAIN NOTED NONE PRSNT: CPT | Performed by: INTERNAL MEDICINE

## 2024-11-20 PROCEDURE — 1123F ACP DISCUSS/DSCN MKR DOCD: CPT | Performed by: INTERNAL MEDICINE

## 2024-11-20 PROCEDURE — 99214 OFFICE O/P EST MOD 30 MIN: CPT | Performed by: INTERNAL MEDICINE

## 2024-11-20 RX ORDER — LIDOCAINE/PRILOCAINE 2.5 %-2.5%
CREAM (GRAM) TOPICAL
Qty: 30 G | Refills: 2 | Status: SHIPPED | OUTPATIENT
Start: 2024-11-20

## 2024-11-27 NOTE — PROGRESS NOTES
Chief Complaint   Patient presents with    Follow-up     Uterine cancer         DIAGNOSIS:   Stage IIIA (FIGO stage IIIc1)  ([id number]) endometrial cancer, High grade serous carcinoma.   CURRENT THERAPY:  Status post radical hysterectomy, bilateral salpingo-oophorectomy and lymph node dissection 10/25/2023  chemoimmunotherapy with carboplatin, Taxol and Dostarlimab postoperatively  Chemotherapy has been poorly tolerated leading to hospitalization and prolonged rehab stent  Holding chemotherapy waiting for recovery  BRIEF CASE HISTORY:   Renetta Malcolm is a very pleasant 74 y.o. female who is referred to us for recently diagnosed endometrial cancer.  She presented with postmenopausal bleeding.  She was initially on anticoagulation and the bleeding was thought to be due to anticoagulation.  However further work-up showed an ultrasound that showed thickened endometrium.  In 2019, underwent endometrial sampling that was benign.  Because of persistent symptoms, CT scan was done in October/23 and revealed a hypodensity endometrial canal concerning for endometrial mass.  Biopsy showed serous carcinoma on 10/9/2023.  The patient was referred to gynecology oncology and ultimately underwent robotic assisted radical hysterectomy and lymph node sampling on 10/25/2023.  The tumor measured 5.4 cm and invaded through the [place] atrium to the serosal surface.  There was significant lymphovascular invasion.  Ovaries and fallopian tubes were negative.  There was metastatic carcinoma that present on the right obturator and external iliac (bilateral)  lymph nodes.  Pelvic washings were positive for non-small cell carcinoma .of note, that the right obturator node was stuck to the obturator canal and was not removed for risk of bleeding.  Paraortic lymph nodes were negative.  Pathological staging is T3 aN1 M0  Patient did well postoperatively.  She was referred to us for consideration of chemoimmunotherapy based on guidelines.  She

## 2024-12-17 ENCOUNTER — HOSPITAL ENCOUNTER (OUTPATIENT)
Dept: INFUSION THERAPY | Age: 74
Discharge: HOME | End: 2024-12-17
Payer: MEDICARE

## 2024-12-17 VITALS — SYSTOLIC BLOOD PRESSURE: 120 MMHG | HEART RATE: 84 BPM | DIASTOLIC BLOOD PRESSURE: 67 MMHG | RESPIRATION RATE: 18 BRPM

## 2024-12-17 DIAGNOSIS — C77.5 ADENOCARCINOMA METASTATIC TO PELVIC LYMPH NODE (HCC): ICD-10-CM

## 2024-12-17 DIAGNOSIS — C54.1 MALIGNANT NEOPLASM OF ENDOMETRIUM (HCC): ICD-10-CM

## 2024-12-17 DIAGNOSIS — C54.9 SEROUS CARCINOMA OF BODY OF UTERUS (HCC): Primary | ICD-10-CM

## 2024-12-17 LAB
ALBUMIN SERPL-MCNC: 4 G/DL (ref 3.5–5.2)
ALBUMIN/GLOB SERPL: 1.5 {RATIO} (ref 1–2.5)
ALP SERPL-CCNC: 74 U/L (ref 35–104)
ALT SERPL-CCNC: 16 U/L (ref 10–35)
ANION GAP SERPL CALCULATED.3IONS-SCNC: 11 MMOL/L (ref 9–16)
AST SERPL-CCNC: 16 U/L (ref 10–35)
BASOPHILS # BLD: 0.04 K/UL (ref 0–0.2)
BASOPHILS NFR BLD: 1 % (ref 0–2)
BILIRUB SERPL-MCNC: 0.4 MG/DL (ref 0–1.2)
BUN SERPL-MCNC: 9 MG/DL (ref 8–23)
BUN/CREAT SERPL: 13 (ref 9–20)
CALCIUM SERPL-MCNC: 9.1 MG/DL (ref 8.6–10.4)
CANCER AG125 SERPL-ACNC: 6 U/ML (ref 0–38)
CHLORIDE SERPL-SCNC: 103 MMOL/L (ref 98–107)
CO2 SERPL-SCNC: 27 MMOL/L (ref 20–31)
CREAT SERPL-MCNC: 0.7 MG/DL (ref 0.5–0.9)
EOSINOPHIL # BLD: 0.18 K/UL (ref 0–0.44)
EOSINOPHILS RELATIVE PERCENT: 2 % (ref 1–4)
ERYTHROCYTE [DISTWIDTH] IN BLOOD BY AUTOMATED COUNT: 12.6 % (ref 11.8–14.4)
GFR, ESTIMATED: >90 ML/MIN/1.73M2
GLUCOSE SERPL-MCNC: 120 MG/DL (ref 74–99)
HCT VFR BLD AUTO: 35.4 % (ref 36.3–47.1)
HGB BLD-MCNC: 11.7 G/DL (ref 11.9–15.1)
IMM GRANULOCYTES # BLD AUTO: <0.03 K/UL (ref 0–0.3)
IMM GRANULOCYTES NFR BLD: 0 %
LYMPHOCYTES NFR BLD: 1.24 K/UL (ref 1.1–3.7)
LYMPHOCYTES RELATIVE PERCENT: 16 % (ref 24–43)
MCH RBC QN AUTO: 33.6 PG (ref 25.2–33.5)
MCHC RBC AUTO-ENTMCNC: 33.1 G/DL (ref 28.4–34.8)
MCV RBC AUTO: 101.7 FL (ref 82.6–102.9)
MONOCYTES NFR BLD: 0.74 K/UL (ref 0.1–1.2)
MONOCYTES NFR BLD: 9 % (ref 3–12)
NEUTROPHILS NFR BLD: 72 % (ref 36–65)
NEUTS SEG NFR BLD: 5.64 K/UL (ref 1.5–8.1)
NRBC BLD-RTO: 0 PER 100 WBC
PLATELET # BLD AUTO: 238 K/UL (ref 138–453)
PMV BLD AUTO: 9.5 FL (ref 8.1–13.5)
POTASSIUM SERPL-SCNC: 3.8 MMOL/L (ref 3.7–5.3)
PROT SERPL-MCNC: 6.7 G/DL (ref 6.6–8.7)
RBC # BLD AUTO: 3.48 M/UL (ref 3.95–5.11)
SODIUM SERPL-SCNC: 141 MMOL/L (ref 136–145)
WBC OTHER # BLD: 7.9 K/UL (ref 3.5–11.3)

## 2024-12-17 PROCEDURE — 86304 IMMUNOASSAY TUMOR CA 125: CPT

## 2024-12-17 PROCEDURE — 2580000003 HC RX 258: Performed by: INTERNAL MEDICINE

## 2024-12-17 PROCEDURE — 6360000002 HC RX W HCPCS: Performed by: INTERNAL MEDICINE

## 2024-12-17 PROCEDURE — 36415 COLL VENOUS BLD VENIPUNCTURE: CPT

## 2024-12-17 PROCEDURE — 85025 COMPLETE CBC W/AUTO DIFF WBC: CPT

## 2024-12-17 PROCEDURE — 36591 DRAW BLOOD OFF VENOUS DEVICE: CPT

## 2024-12-17 PROCEDURE — 80053 COMPREHEN METABOLIC PANEL: CPT

## 2024-12-17 RX ORDER — HEPARIN 100 UNIT/ML
500 SYRINGE INTRAVENOUS PRN
Status: DISCONTINUED | OUTPATIENT
Start: 2024-12-17 | End: 2024-12-18 | Stop reason: HOSPADM

## 2024-12-17 RX ORDER — SODIUM CHLORIDE 0.9 % (FLUSH) 0.9 %
5-40 SYRINGE (ML) INJECTION PRN
Status: DISCONTINUED | OUTPATIENT
Start: 2024-12-17 | End: 2024-12-18 | Stop reason: HOSPADM

## 2024-12-17 RX ADMIN — SODIUM CHLORIDE, PRESERVATIVE FREE 10 ML: 5 INJECTION INTRAVENOUS at 13:17

## 2024-12-17 RX ADMIN — HEPARIN 500 UNITS: 100 SYRINGE at 13:18

## 2024-12-17 RX ADMIN — SODIUM CHLORIDE, PRESERVATIVE FREE 10 ML: 5 INJECTION INTRAVENOUS at 13:18

## 2025-01-14 ENCOUNTER — TELEPHONE (OUTPATIENT)
Dept: DIABETES SERVICES | Age: 75
End: 2025-01-14

## 2025-01-28 ENCOUNTER — HOSPITAL ENCOUNTER (OUTPATIENT)
Dept: INFUSION THERAPY | Age: 75
Discharge: HOME | End: 2025-01-28
Payer: MEDICARE

## 2025-01-28 DIAGNOSIS — C54.9 SEROUS CARCINOMA OF BODY OF UTERUS (HCC): Primary | ICD-10-CM

## 2025-01-28 DIAGNOSIS — C55 MALIGNANT NEOPLASM OF UTERUS, UNSPECIFIED SITE (HCC): ICD-10-CM

## 2025-01-28 DIAGNOSIS — C77.5 ADENOCARCINOMA METASTATIC TO PELVIC LYMPH NODE (HCC): ICD-10-CM

## 2025-01-28 LAB
ALBUMIN SERPL-MCNC: 4.1 G/DL (ref 3.5–5.2)
ALBUMIN/GLOB SERPL: 1.4 {RATIO} (ref 1–2.5)
ALP SERPL-CCNC: 73 U/L (ref 35–104)
ALT SERPL-CCNC: 21 U/L (ref 10–35)
ANION GAP SERPL CALCULATED.3IONS-SCNC: 12 MMOL/L (ref 9–16)
AST SERPL-CCNC: 20 U/L (ref 10–35)
BASOPHILS # BLD: 0.05 K/UL (ref 0–0.2)
BASOPHILS NFR BLD: 1 % (ref 0–2)
BILIRUB SERPL-MCNC: 0.4 MG/DL (ref 0–1.2)
BUN SERPL-MCNC: 10 MG/DL (ref 8–23)
BUN/CREAT SERPL: 17 (ref 9–20)
CALCIUM SERPL-MCNC: 9.5 MG/DL (ref 8.6–10.4)
CHLORIDE SERPL-SCNC: 102 MMOL/L (ref 98–107)
CO2 SERPL-SCNC: 27 MMOL/L (ref 20–31)
CREAT SERPL-MCNC: 0.6 MG/DL (ref 0.5–0.9)
EOSINOPHIL # BLD: 0.19 K/UL (ref 0–0.44)
EOSINOPHILS RELATIVE PERCENT: 3 % (ref 1–4)
ERYTHROCYTE [DISTWIDTH] IN BLOOD BY AUTOMATED COUNT: 13 % (ref 11.8–14.4)
GFR, ESTIMATED: >90 ML/MIN/1.73M2
GLUCOSE SERPL-MCNC: 124 MG/DL (ref 74–99)
HCT VFR BLD AUTO: 36.2 % (ref 36.3–47.1)
HGB BLD-MCNC: 11.9 G/DL (ref 11.9–15.1)
IMM GRANULOCYTES # BLD AUTO: <0.03 K/UL (ref 0–0.3)
IMM GRANULOCYTES NFR BLD: 0 %
LYMPHOCYTES NFR BLD: 1.43 K/UL (ref 1.1–3.7)
LYMPHOCYTES RELATIVE PERCENT: 22 % (ref 24–43)
MCH RBC QN AUTO: 32.4 PG (ref 25.2–33.5)
MCHC RBC AUTO-ENTMCNC: 32.9 G/DL (ref 28.4–34.8)
MCV RBC AUTO: 98.6 FL (ref 82.6–102.9)
MONOCYTES NFR BLD: 0.55 K/UL (ref 0.1–1.2)
MONOCYTES NFR BLD: 8 % (ref 3–12)
NEUTROPHILS NFR BLD: 66 % (ref 36–65)
NEUTS SEG NFR BLD: 4.39 K/UL (ref 1.5–8.1)
NRBC BLD-RTO: 0 PER 100 WBC
PLATELET # BLD AUTO: 243 K/UL (ref 138–453)
PMV BLD AUTO: 9.8 FL (ref 8.1–13.5)
POTASSIUM SERPL-SCNC: 4.2 MMOL/L (ref 3.7–5.3)
PROT SERPL-MCNC: 6.9 G/DL (ref 6.6–8.7)
RBC # BLD AUTO: 3.67 M/UL (ref 3.95–5.11)
SODIUM SERPL-SCNC: 141 MMOL/L (ref 136–145)
WBC OTHER # BLD: 6.6 K/UL (ref 3.5–11.3)

## 2025-01-28 PROCEDURE — 6360000002 HC RX W HCPCS: Performed by: INTERNAL MEDICINE

## 2025-01-28 PROCEDURE — 85025 COMPLETE CBC W/AUTO DIFF WBC: CPT

## 2025-01-28 PROCEDURE — 80053 COMPREHEN METABOLIC PANEL: CPT

## 2025-01-28 PROCEDURE — 86304 IMMUNOASSAY TUMOR CA 125: CPT

## 2025-01-28 PROCEDURE — 36591 DRAW BLOOD OFF VENOUS DEVICE: CPT

## 2025-01-28 PROCEDURE — 2500000003 HC RX 250 WO HCPCS: Performed by: INTERNAL MEDICINE

## 2025-01-28 RX ORDER — SODIUM CHLORIDE 0.9 % (FLUSH) 0.9 %
5-40 SYRINGE (ML) INJECTION PRN
Status: DISCONTINUED | OUTPATIENT
Start: 2025-01-28 | End: 2025-01-29 | Stop reason: HOSPADM

## 2025-01-28 RX ORDER — HEPARIN 100 UNIT/ML
500 SYRINGE INTRAVENOUS PRN
Status: DISCONTINUED | OUTPATIENT
Start: 2025-01-28 | End: 2025-01-29 | Stop reason: HOSPADM

## 2025-01-28 RX ADMIN — HEPARIN 500 UNITS: 100 SYRINGE at 14:16

## 2025-01-28 RX ADMIN — SODIUM CHLORIDE, PRESERVATIVE FREE 10 ML: 5 INJECTION INTRAVENOUS at 14:16

## 2025-01-28 RX ADMIN — SODIUM CHLORIDE, PRESERVATIVE FREE 10 ML: 5 INJECTION INTRAVENOUS at 14:15

## 2025-01-29 LAB — CANCER AG125 SERPL-ACNC: 7 U/ML (ref 0–38)

## 2025-02-04 ENCOUNTER — HOSPITAL ENCOUNTER (OUTPATIENT)
Age: 75
Discharge: HOME | End: 2025-02-04
Attending: INTERNAL MEDICINE
Payer: MEDICARE

## 2025-02-04 ENCOUNTER — HOSPITAL ENCOUNTER (OUTPATIENT)
Dept: CT IMAGING | Age: 75
Discharge: HOME | End: 2025-02-06
Attending: INTERNAL MEDICINE
Payer: MEDICARE

## 2025-02-04 DIAGNOSIS — C54.1 MALIGNANT NEOPLASM OF ENDOMETRIUM (HCC): ICD-10-CM

## 2025-02-04 LAB
EST. AVERAGE GLUCOSE BLD GHB EST-MCNC: 143 MG/DL
HBA1C MFR BLD: 6.6 % (ref 4–6)

## 2025-02-04 PROCEDURE — 74177 CT ABD & PELVIS W/CONTRAST: CPT

## 2025-02-04 PROCEDURE — 36415 COLL VENOUS BLD VENIPUNCTURE: CPT

## 2025-02-04 PROCEDURE — 83036 HEMOGLOBIN GLYCOSYLATED A1C: CPT

## 2025-02-04 PROCEDURE — 6360000004 HC RX CONTRAST MEDICATION: Performed by: INTERNAL MEDICINE

## 2025-02-04 RX ORDER — IOPAMIDOL 755 MG/ML
75 INJECTION, SOLUTION INTRAVASCULAR
Status: COMPLETED | OUTPATIENT
Start: 2025-02-04 | End: 2025-02-04

## 2025-02-04 RX ORDER — IOPAMIDOL 755 MG/ML
18 INJECTION, SOLUTION INTRAVASCULAR
Status: COMPLETED | OUTPATIENT
Start: 2025-02-04 | End: 2025-02-04

## 2025-02-04 RX ADMIN — IOPAMIDOL 75 ML: 755 INJECTION, SOLUTION INTRAVENOUS at 11:46

## 2025-02-04 RX ADMIN — IOPAMIDOL 18 ML: 755 INJECTION, SOLUTION INTRAVENOUS at 11:46

## 2025-02-05 ENCOUNTER — TELEPHONE (OUTPATIENT)
Dept: ONCOLOGY | Age: 75
End: 2025-02-05

## 2025-02-05 NOTE — TELEPHONE ENCOUNTER
Name: Renetta Malcolm  : 1950  MRN: [id number]    Oncology Navigation Follow-Up Note        Notes: chart reviewed.  Pt on surveillance every 3 months.  Navigation will sign off but will be available any time needed.      Electronically signed by Estella Zhao RN on 2025 at 3:09 PM

## 2025-02-19 ENCOUNTER — OFFICE VISIT (OUTPATIENT)
Dept: ONCOLOGY | Age: 75
End: 2025-02-19
Payer: MEDICARE

## 2025-02-19 VITALS
TEMPERATURE: 96.9 F | DIASTOLIC BLOOD PRESSURE: 71 MMHG | HEART RATE: 103 BPM | WEIGHT: 157 LBS | RESPIRATION RATE: 18 BRPM | SYSTOLIC BLOOD PRESSURE: 126 MMHG | BODY MASS INDEX: 25.34 KG/M2

## 2025-02-19 DIAGNOSIS — C54.1 MALIGNANT NEOPLASM OF ENDOMETRIUM (HCC): Primary | ICD-10-CM

## 2025-02-19 PROCEDURE — 99214 OFFICE O/P EST MOD 30 MIN: CPT | Performed by: INTERNAL MEDICINE

## 2025-02-19 PROCEDURE — 1126F AMNT PAIN NOTED NONE PRSNT: CPT | Performed by: INTERNAL MEDICINE

## 2025-02-19 PROCEDURE — 1123F ACP DISCUSS/DSCN MKR DOCD: CPT | Performed by: INTERNAL MEDICINE

## 2025-02-19 PROCEDURE — 1159F MED LIST DOCD IN RCRD: CPT | Performed by: INTERNAL MEDICINE

## 2025-02-26 NOTE — PROGRESS NOTES
Chief Complaint   Patient presents with    Follow-up     Uterine cancer         DIAGNOSIS:   Stage IIIA (FIGO stage IIIc1)  ([id number]) endometrial cancer, High grade serous carcinoma.   CURRENT THERAPY:  Status post radical hysterectomy, bilateral salpingo-oophorectomy and lymph node dissection 10/25/2023  chemoimmunotherapy with carboplatin, Taxol and Dostarlimab postoperatively  Chemotherapy has been poorly tolerated leading to hospitalization and prolonged rehab stay.    BRIEF CASE HISTORY:   Renetta Malcolm is a very pleasant 74 y.o. female who is referred to us for recently diagnosed endometrial cancer.  She presented with postmenopausal bleeding.  She was initially on anticoagulation and the bleeding was thought to be due to anticoagulation.  However further work-up showed an ultrasound that showed thickened endometrium.  In 2019, underwent endometrial sampling that was benign.  Because of persistent symptoms, CT scan was done in October/23 and revealed a hypodensity endometrial canal concerning for endometrial mass.  Biopsy showed serous carcinoma on 10/9/2023.  The patient was referred to gynecology oncology and ultimately underwent robotic assisted radical hysterectomy and lymph node sampling on 10/25/2023.  The tumor measured 5.4 cm and invaded through the [place] atrium to the serosal surface.  There was significant lymphovascular invasion.  Ovaries and fallopian tubes were negative.  There was metastatic carcinoma that present on the right obturator and external iliac (bilateral)  lymph nodes.  Pelvic washings were positive for non-small cell carcinoma .of note, that the right obturator node was stuck to the obturator canal and was not removed for risk of bleeding.  Paraortic lymph nodes were negative.  Pathological staging is T3 aN1 M0  Patient did well postoperatively.  She was referred to us for consideration of chemoimmunotherapy based on guidelines.  She is sent to us for a consultation,

## 2025-03-11 ENCOUNTER — HOSPITAL ENCOUNTER (OUTPATIENT)
Dept: INFUSION THERAPY | Age: 75
Discharge: HOME | End: 2025-03-11
Payer: MEDICARE

## 2025-03-11 DIAGNOSIS — C54.1 MALIGNANT NEOPLASM OF ENDOMETRIUM (HCC): Primary | ICD-10-CM

## 2025-03-11 DIAGNOSIS — C77.5 ADENOCARCINOMA METASTATIC TO PELVIC LYMPH NODE (HCC): ICD-10-CM

## 2025-03-11 DIAGNOSIS — C54.1 MALIGNANT NEOPLASM OF ENDOMETRIUM (HCC): ICD-10-CM

## 2025-03-11 DIAGNOSIS — C54.9 SEROUS CARCINOMA OF BODY OF UTERUS: Primary | ICD-10-CM

## 2025-03-11 LAB
ALBUMIN SERPL-MCNC: 4.1 G/DL (ref 3.5–5.2)
ALBUMIN/GLOB SERPL: 1.4 {RATIO} (ref 1–2.5)
ALP SERPL-CCNC: 71 U/L (ref 35–104)
ALT SERPL-CCNC: 22 U/L (ref 10–35)
ANION GAP SERPL CALCULATED.3IONS-SCNC: 12 MMOL/L (ref 9–16)
AST SERPL-CCNC: 20 U/L (ref 10–35)
BASOPHILS # BLD: 0.04 K/UL (ref 0–0.2)
BASOPHILS NFR BLD: 1 % (ref 0–2)
BILIRUB SERPL-MCNC: 0.5 MG/DL (ref 0–1.2)
BUN SERPL-MCNC: 10 MG/DL (ref 8–23)
BUN/CREAT SERPL: 14 (ref 9–20)
CALCIUM SERPL-MCNC: 9.4 MG/DL (ref 8.6–10.4)
CHLORIDE SERPL-SCNC: 102 MMOL/L (ref 98–107)
CO2 SERPL-SCNC: 26 MMOL/L (ref 20–31)
CREAT SERPL-MCNC: 0.7 MG/DL (ref 0.5–0.9)
EOSINOPHIL # BLD: 0.22 K/UL (ref 0–0.44)
EOSINOPHILS RELATIVE PERCENT: 4 % (ref 1–4)
ERYTHROCYTE [DISTWIDTH] IN BLOOD BY AUTOMATED COUNT: 13.5 % (ref 11.8–14.4)
GFR, ESTIMATED: 89 ML/MIN/1.73M2
GLUCOSE SERPL-MCNC: 143 MG/DL (ref 74–99)
HCT VFR BLD AUTO: 36.8 % (ref 36.3–47.1)
HGB BLD-MCNC: 12.2 G/DL (ref 11.9–15.1)
IMM GRANULOCYTES # BLD AUTO: <0.03 K/UL (ref 0–0.3)
IMM GRANULOCYTES NFR BLD: 0 %
LYMPHOCYTES NFR BLD: 1.46 K/UL (ref 1.1–3.7)
LYMPHOCYTES RELATIVE PERCENT: 24 % (ref 24–43)
MCH RBC QN AUTO: 32.5 PG (ref 25.2–33.5)
MCHC RBC AUTO-ENTMCNC: 33.2 G/DL (ref 28.4–34.8)
MCV RBC AUTO: 98.1 FL (ref 82.6–102.9)
MONOCYTES NFR BLD: 0.59 K/UL (ref 0.1–1.2)
MONOCYTES NFR BLD: 10 % (ref 3–12)
NEUTROPHILS NFR BLD: 61 % (ref 36–65)
NEUTS SEG NFR BLD: 3.76 K/UL (ref 1.5–8.1)
NRBC BLD-RTO: 0 PER 100 WBC
PLATELET # BLD AUTO: 259 K/UL (ref 138–453)
PMV BLD AUTO: 9.6 FL (ref 8.1–13.5)
POTASSIUM SERPL-SCNC: 4.4 MMOL/L (ref 3.7–5.3)
PROT SERPL-MCNC: 7 G/DL (ref 6.6–8.7)
RBC # BLD AUTO: 3.75 M/UL (ref 3.95–5.11)
SODIUM SERPL-SCNC: 140 MMOL/L (ref 136–145)
WBC OTHER # BLD: 6.1 K/UL (ref 3.5–11.3)

## 2025-03-11 PROCEDURE — 6360000002 HC RX W HCPCS: Performed by: INTERNAL MEDICINE

## 2025-03-11 PROCEDURE — 85025 COMPLETE CBC W/AUTO DIFF WBC: CPT

## 2025-03-11 PROCEDURE — 36415 COLL VENOUS BLD VENIPUNCTURE: CPT

## 2025-03-11 PROCEDURE — 80053 COMPREHEN METABOLIC PANEL: CPT

## 2025-03-11 PROCEDURE — 36591 DRAW BLOOD OFF VENOUS DEVICE: CPT

## 2025-03-11 PROCEDURE — 86304 IMMUNOASSAY TUMOR CA 125: CPT

## 2025-03-11 PROCEDURE — 2500000003 HC RX 250 WO HCPCS: Performed by: INTERNAL MEDICINE

## 2025-03-11 RX ORDER — HEPARIN 100 UNIT/ML
500 SYRINGE INTRAVENOUS PRN
Status: DISCONTINUED | OUTPATIENT
Start: 2025-03-11 | End: 2025-03-12 | Stop reason: HOSPADM

## 2025-03-11 RX ORDER — SODIUM CHLORIDE 0.9 % (FLUSH) 0.9 %
5-40 SYRINGE (ML) INJECTION PRN
Status: DISCONTINUED | OUTPATIENT
Start: 2025-03-11 | End: 2025-03-12 | Stop reason: HOSPADM

## 2025-03-11 RX ADMIN — SODIUM CHLORIDE, PRESERVATIVE FREE 10 ML: 5 INJECTION INTRAVENOUS at 14:09

## 2025-03-11 RX ADMIN — HEPARIN 500 UNITS: 100 SYRINGE at 14:09

## 2025-03-11 RX ADMIN — SODIUM CHLORIDE, PRESERVATIVE FREE 10 ML: 5 INJECTION INTRAVENOUS at 14:07

## 2025-03-11 RX ADMIN — SODIUM CHLORIDE, PRESERVATIVE FREE 10 ML: 5 INJECTION INTRAVENOUS at 14:08

## 2025-03-12 LAB — CANCER AG125 SERPL-ACNC: 7 U/ML (ref 0–38)

## 2025-03-13 DIAGNOSIS — C54.1 MALIGNANT NEOPLASM OF ENDOMETRIUM (HCC): Primary | ICD-10-CM

## 2025-04-09 ENCOUNTER — HOSPITAL ENCOUNTER (OUTPATIENT)
Dept: INTERVENTIONAL RADIOLOGY/VASCULAR | Age: 75
Discharge: HOME | End: 2025-04-11
Attending: INTERNAL MEDICINE
Payer: MEDICARE

## 2025-04-09 VITALS
OXYGEN SATURATION: 97 % | RESPIRATION RATE: 18 BRPM | SYSTOLIC BLOOD PRESSURE: 128 MMHG | TEMPERATURE: 97.7 F | HEART RATE: 75 BPM | DIASTOLIC BLOOD PRESSURE: 64 MMHG

## 2025-04-09 DIAGNOSIS — C54.1 MALIGNANT NEOPLASM OF ENDOMETRIUM (HCC): ICD-10-CM

## 2025-04-09 PROCEDURE — 36590 REMOVAL TUNNELED CV CATH: CPT

## 2025-04-09 PROCEDURE — 7100000010 HC PHASE II RECOVERY - FIRST 15 MIN

## 2025-04-09 PROCEDURE — 2500000003 HC RX 250 WO HCPCS: Performed by: RADIOLOGY

## 2025-04-09 PROCEDURE — 77002 NEEDLE LOCALIZATION BY XRAY: CPT

## 2025-04-09 PROCEDURE — 7100000011 HC PHASE II RECOVERY - ADDTL 15 MIN

## 2025-04-09 PROCEDURE — 2709999900 HC NON-CHARGEABLE SUPPLY

## 2025-04-09 RX ORDER — BUPIVACAINE HYDROCHLORIDE AND EPINEPHRINE 5; 5 MG/ML; UG/ML
INJECTION, SOLUTION EPIDURAL; INTRACAUDAL; PERINEURAL PRN
Status: COMPLETED | OUTPATIENT
Start: 2025-04-09 | End: 2025-04-09

## 2025-04-09 RX ADMIN — BUPIVACAINE HYDROCHLORIDE AND EPINEPHRINE BITARTRATE 7 ML: 5; .0091 INJECTION, SOLUTION EPIDURAL; INTRACAUDAL; PERINEURAL at 14:59

## 2025-04-09 NOTE — BRIEF OP NOTE
Brief Postoperative Note    Renetta Malcolm  YOB: 1950  [id number]    Pre-operative Diagnosis: Medport removal    Post-operative Diagnosis: Same    Procedure: Port Removal    Anesthesia: Local    Surgeons/Assistants: Dr. Bustillos    Estimated Blood Loss: Minimal    Complications: None    Specimens: Was Not Obtained    Findings: Successful removal of right-sided port. Incision closure with dissolving suture and application of skin adhesive glue. Patient tolerated procedure well.    Electronically signed by Sneha Hull PA-C on 4/9/2025 at 3:18 PM

## 2025-04-09 NOTE — PROGRESS NOTES
All discharge instructions given, all questions answered at this time. Patient assisted out of department in wheelchair up to private car.

## 2025-04-09 NOTE — DISCHARGE INSTRUCTIONS
DISCHARGE INSTRUCTIONS FOR PORTCATH Removal    WOUND CARE:  Your wound was closed using sutures inside which will dissolve on their own.   The incision is held together with derma bond and covered with a tegaderm and telfa.  This clear bandage may be changed after 24 hours and daily until healed.    You may shower after 24 hours but keep dressing covered with clear dressing to keep dry.   No baths or swimming at this time.    Some patients are allergic to the dressing and notice a rash; if this happens notify your physician. After showering pat incision area dry.   If steri strips do not fall off after 1 wk please remove them from the incision.  DIET:    Resume your normal diet.  ACTIVITY:   You may resume your normal activity after 24 hours.  No exercising, lifting heavy objects or strenuous activity for 7 days.    PAIN:   You may use Tylenol for pain or apply ice to the site on for 20 min each hour x 24 hours.  WHEN TO CALL PHYSICIAN:  Fever greater than 101.5 and chills.  Swelling or severe pain in arm/leg on side of port.  Bleeding, redness, drainage or swelling at or around the port.  Call 911 for any shortness of breath, severe bleeding or chest pain.

## 2025-07-28 ENCOUNTER — HOSPITAL ENCOUNTER (OUTPATIENT)
Age: 75
End: 2025-07-28
Payer: MEDICARE

## 2025-08-04 ENCOUNTER — HOSPITAL ENCOUNTER (OUTPATIENT)
Dept: CT IMAGING | Age: 75
Discharge: HOME | End: 2025-08-06
Attending: INTERNAL MEDICINE
Payer: MEDICARE

## 2025-08-04 ENCOUNTER — HOSPITAL ENCOUNTER (OUTPATIENT)
Dept: LAB | Age: 75
Discharge: HOME | End: 2025-08-04
Payer: MEDICARE

## 2025-08-04 DIAGNOSIS — C54.1 MALIGNANT NEOPLASM OF ENDOMETRIUM (HCC): ICD-10-CM

## 2025-08-04 LAB
CREAT SERPL-MCNC: 0.9 MG/DL (ref 0.5–0.9)
GFR, ESTIMATED: 64 ML/MIN/1.73M2

## 2025-08-04 PROCEDURE — 82565 ASSAY OF CREATININE: CPT

## 2025-08-04 PROCEDURE — 36415 COLL VENOUS BLD VENIPUNCTURE: CPT

## 2025-08-04 PROCEDURE — 74177 CT ABD & PELVIS W/CONTRAST: CPT

## 2025-08-04 PROCEDURE — 6360000004 HC RX CONTRAST MEDICATION: Performed by: INTERNAL MEDICINE

## 2025-08-04 RX ORDER — IOPAMIDOL 755 MG/ML
75 INJECTION, SOLUTION INTRAVASCULAR
Status: COMPLETED | OUTPATIENT
Start: 2025-08-04 | End: 2025-08-04

## 2025-08-04 RX ORDER — IOPAMIDOL 755 MG/ML
18 INJECTION, SOLUTION INTRAVASCULAR
Status: COMPLETED | OUTPATIENT
Start: 2025-08-04 | End: 2025-08-04

## 2025-08-04 RX ADMIN — IOPAMIDOL 18 ML: 755 INJECTION, SOLUTION INTRAVENOUS at 13:03

## 2025-08-04 RX ADMIN — IOPAMIDOL 75 ML: 755 INJECTION, SOLUTION INTRAVENOUS at 13:04

## 2025-08-13 ENCOUNTER — OFFICE VISIT (OUTPATIENT)
Dept: ONCOLOGY | Age: 75
End: 2025-08-13
Payer: MEDICARE

## 2025-08-13 VITALS
TEMPERATURE: 97.5 F | RESPIRATION RATE: 18 BRPM | SYSTOLIC BLOOD PRESSURE: 124 MMHG | HEART RATE: 88 BPM | BODY MASS INDEX: 25.5 KG/M2 | WEIGHT: 158 LBS | DIASTOLIC BLOOD PRESSURE: 69 MMHG

## 2025-08-13 DIAGNOSIS — C54.1 MALIGNANT NEOPLASM OF ENDOMETRIUM (HCC): Primary | ICD-10-CM

## 2025-08-13 PROCEDURE — 1123F ACP DISCUSS/DSCN MKR DOCD: CPT | Performed by: INTERNAL MEDICINE

## 2025-08-13 PROCEDURE — 1126F AMNT PAIN NOTED NONE PRSNT: CPT | Performed by: INTERNAL MEDICINE

## 2025-08-13 PROCEDURE — 1159F MED LIST DOCD IN RCRD: CPT | Performed by: INTERNAL MEDICINE

## 2025-08-13 PROCEDURE — 99214 OFFICE O/P EST MOD 30 MIN: CPT | Performed by: INTERNAL MEDICINE

## 2025-08-14 ENCOUNTER — TELEPHONE (OUTPATIENT)
Dept: ONCOLOGY | Age: 75
End: 2025-08-14

## 2025-08-25 ENCOUNTER — HOSPITAL ENCOUNTER (OUTPATIENT)
Dept: LAB | Age: 75
Discharge: HOME | End: 2025-08-25
Payer: MEDICARE

## 2025-08-25 DIAGNOSIS — C54.1 MALIGNANT NEOPLASM OF ENDOMETRIUM (HCC): ICD-10-CM

## 2025-08-25 DIAGNOSIS — C54.9 SEROUS CARCINOMA OF BODY OF UTERUS (HCC): ICD-10-CM

## 2025-08-25 DIAGNOSIS — C77.5 ADENOCARCINOMA METASTATIC TO PELVIC LYMPH NODE (HCC): ICD-10-CM

## 2025-08-25 DIAGNOSIS — G89.18 POST-OP PAIN: ICD-10-CM

## 2025-08-25 DIAGNOSIS — C55 MALIGNANT NEOPLASM OF UTERUS, UNSPECIFIED SITE (HCC): ICD-10-CM

## 2025-08-25 DIAGNOSIS — K59.00 CONSTIPATION, UNSPECIFIED CONSTIPATION TYPE: ICD-10-CM

## 2025-08-25 DIAGNOSIS — C54.1 MALIGNANT NEOPLASM OF ENDOMETRIUM (HCC): Primary | ICD-10-CM

## 2025-08-25 LAB — CANCER AG125 SERPL-ACNC: 7 U/ML (ref 0–38)

## 2025-08-25 PROCEDURE — 86304 IMMUNOASSAY TUMOR CA 125: CPT

## 2025-08-25 PROCEDURE — 36415 COLL VENOUS BLD VENIPUNCTURE: CPT

## (undated) DEVICE — NEEDLE HYPO 25GA L1.5IN BLU POLYPR HUB S STL REG BVL STR

## (undated) DEVICE — TOWEL,OR,DSP,ST,NATURAL,DLX,4/PK,20PK/CS: Brand: MEDLINE

## (undated) DEVICE — TIP COVER ACCESSORY

## (undated) DEVICE — GOWN,SIRUS,NONRNF,SETINSLV,XL,20/CS: Brand: MEDLINE

## (undated) DEVICE — GLOVE SURG SZ 65 L12IN FNGR THK79MIL GRN LTX FREE

## (undated) DEVICE — CONTAINER,SPECIMEN,4OZ,OR STRL: Brand: MEDLINE

## (undated) DEVICE — 6 ML SYRINGE LUER-LOCK TIP: Brand: MONOJECT

## (undated) DEVICE — PROTECTOR ULN NRV PUR FOAM HK LOOP STRP ANATOMICALLY

## (undated) DEVICE — GLOVE SURG SZ 6 THK91MIL LTX FREE SYN POLYISOPRENE ANTI

## (undated) DEVICE — ELECTRODE PT RET AD L9FT HI MOIST COND ADH HYDRGEL CORDED

## (undated) DEVICE — TRI-LUMEN FILTERED TUBE SET WITH ACTIVATED CHARCOAL FILTER: Brand: AIRSEAL

## (undated) DEVICE — PAD PT POS 36 IN SURGYPAD DISP

## (undated) DEVICE — LIQUIBAND RAPID ADHESIVE 36/CS 0.8ML: Brand: MEDLINE

## (undated) DEVICE — INSUFFLATION NEEDLE TO ESTABLISH PNEUMOPERITONEUM.: Brand: INSUFFLATION NEEDLE

## (undated) DEVICE — SVMMC GYN ROBOTIC PK

## (undated) DEVICE — ARM DRAPE

## (undated) DEVICE — INSTRUMENT REPROC SEAL/DIVIDE LAP BLUNT TP LIGASURE NANO-COAT 5MMX37CM

## (undated) DEVICE — 1LYRTR 16FR10ML100%SIL UMS SNP: Brand: MEDLINE INDUSTRIES, INC.

## (undated) DEVICE — COUNTER NDL 10 COUNT HLD 20 FOAM BLK SGL MAG

## (undated) DEVICE — GARMENT,MEDLINE,DVT,INT,CALF,MED, GEN2: Brand: MEDLINE

## (undated) DEVICE — SOLUTION SCRB 4OZ 4% CHG H2O AIDED FOR PREOPERATIVE SKIN

## (undated) DEVICE — TROCAR: Brand: KII FIOS FIRST ENTRY

## (undated) DEVICE — COVER OR TBL W40XL90IN ABSRB STD AND GRIPPY BK SAHARA

## (undated) DEVICE — TISSUE RETRIEVAL SYSTEM: Brand: INZII RETRIEVAL SYSTEM

## (undated) DEVICE — DRAPE,REIN 53X77,STERILE: Brand: MEDLINE

## (undated) DEVICE — SEAL

## (undated) DEVICE — SUTURE SZ 0 27IN 5/8 CIR UR-6  TAPER PT VIOLET ABSRB VICRYL J603H

## (undated) DEVICE — GLOVE SURG SZ 55 CRM LTX FREE POLYISOPRENE POLYMER BEAD ANTI

## (undated) DEVICE — DRAPE,UNDRBUT,WHT GRAD PCH,CAPPORT,20/CS: Brand: MEDLINE

## (undated) DEVICE — AIRSEAL 12 MM ACCESS PORT AND PALM GRIP OBTURATOR WITH BLADELESS OPTICAL TIP, 120 MM LENGTH: Brand: AIRSEAL

## (undated) DEVICE — SOLUTION ANTIFOG VIS SYS CLEARIFY LAPSCP

## (undated) DEVICE — 3M™ IOBAN™ 2 ANTIMICROBIAL INCISE DRAPE 6650EZ: Brand: IOBAN™ 2

## (undated) DEVICE — BLADELESS OBTURATOR: Brand: WECK VISTA

## (undated) DEVICE — APPLICATOR MEDICATED 26 CC SOLUTION HI LT ORNG CHLORAPREP

## (undated) DEVICE — SUTURE VCRL SZ 0 L27IN ABSRB UD L36MM CT-1 1/2 CIR J260H

## (undated) DEVICE — SYRINGE, LUER LOCK, 10ML: Brand: MEDLINE

## (undated) DEVICE — ELECTRO LUBE IS A SINGLE PATIENT USE DEVICE THAT IS INTENDED TO BE USED ON ELECTROSURGICAL ELECTRODES TO REDUCE STICKING.: Brand: KEY SURGICAL ELECTRO LUBE

## (undated) DEVICE — SUTURE MCRYL SZ 4-0 L18IN ABSRB UD L16MM PC-3 3/8 CIR PRIM Y845G

## (undated) DEVICE — MARKER,SKIN,WI/RULER AND LABELS: Brand: MEDLINE

## (undated) DEVICE — NEEDLE SPINAL 22GA L3.5IN SPINOCAN

## (undated) DEVICE — Device: Brand: UTERINE ELEVATOR PRO

## (undated) DEVICE — COVER,LIGHT HANDLE,FLX,2/PK: Brand: MEDLINE INDUSTRIES, INC.

## (undated) DEVICE — STRAP,POSITIONING,KNEE/BODY,FOAM,4X60": Brand: MEDLINE

## (undated) DEVICE — AGENT HEMOSTATIC SURGIFLOW MATRIX KIT W/THROMBIN

## (undated) DEVICE — APPLICATOR ENDOSCP L34CM W/ S STL CANN PLAS OBT STYL FOR